# Patient Record
Sex: MALE | Race: WHITE | NOT HISPANIC OR LATINO | Employment: UNEMPLOYED | URBAN - METROPOLITAN AREA
[De-identification: names, ages, dates, MRNs, and addresses within clinical notes are randomized per-mention and may not be internally consistent; named-entity substitution may affect disease eponyms.]

---

## 2022-05-02 ENCOUNTER — HOSPITAL ENCOUNTER (EMERGENCY)
Facility: MEDICAL CENTER | Age: 62
End: 2022-05-02
Attending: EMERGENCY MEDICINE
Payer: MEDICAID

## 2022-05-02 ENCOUNTER — APPOINTMENT (OUTPATIENT)
Dept: RADIOLOGY | Facility: MEDICAL CENTER | Age: 62
End: 2022-05-02
Attending: EMERGENCY MEDICINE
Payer: MEDICAID

## 2022-05-02 VITALS
OXYGEN SATURATION: 92 % | HEART RATE: 91 BPM | WEIGHT: 160 LBS | HEIGHT: 73 IN | TEMPERATURE: 97 F | SYSTOLIC BLOOD PRESSURE: 136 MMHG | DIASTOLIC BLOOD PRESSURE: 84 MMHG | RESPIRATION RATE: 18 BRPM | BODY MASS INDEX: 21.2 KG/M2

## 2022-05-02 DIAGNOSIS — S09.90XA CLOSED HEAD INJURY, INITIAL ENCOUNTER: ICD-10-CM

## 2022-05-02 DIAGNOSIS — R53.1 GENERALIZED WEAKNESS: ICD-10-CM

## 2022-05-02 LAB
ALBUMIN SERPL BCP-MCNC: 4.1 G/DL (ref 3.2–4.9)
ALBUMIN/GLOB SERPL: 1.4 G/DL
ALP SERPL-CCNC: 120 U/L (ref 30–99)
ALT SERPL-CCNC: 9 U/L (ref 2–50)
ANION GAP SERPL CALC-SCNC: 14 MMOL/L (ref 7–16)
AST SERPL-CCNC: 16 U/L (ref 12–45)
BILIRUB SERPL-MCNC: 0.2 MG/DL (ref 0.1–1.5)
BUN SERPL-MCNC: 8 MG/DL (ref 8–22)
CALCIUM SERPL-MCNC: 8.9 MG/DL (ref 8.5–10.5)
CHLORIDE SERPL-SCNC: 105 MMOL/L (ref 96–112)
CO2 SERPL-SCNC: 23 MMOL/L (ref 20–33)
CREAT SERPL-MCNC: 0.6 MG/DL (ref 0.5–1.4)
GFR SERPLBLD CREATININE-BSD FMLA CKD-EPI: 111 ML/MIN/1.73 M 2
GLOBULIN SER CALC-MCNC: 2.9 G/DL (ref 1.9–3.5)
GLUCOSE SERPL-MCNC: 82 MG/DL (ref 65–99)
POTASSIUM SERPL-SCNC: 4 MMOL/L (ref 3.6–5.5)
PROT SERPL-MCNC: 7 G/DL (ref 6–8.2)
SODIUM SERPL-SCNC: 142 MMOL/L (ref 135–145)

## 2022-05-02 PROCEDURE — 70450 CT HEAD/BRAIN W/O DYE: CPT

## 2022-05-02 PROCEDURE — 36415 COLL VENOUS BLD VENIPUNCTURE: CPT

## 2022-05-02 PROCEDURE — A9270 NON-COVERED ITEM OR SERVICE: HCPCS | Performed by: EMERGENCY MEDICINE

## 2022-05-02 PROCEDURE — 80053 COMPREHEN METABOLIC PANEL: CPT

## 2022-05-02 PROCEDURE — 700102 HCHG RX REV CODE 250 W/ 637 OVERRIDE(OP): Performed by: EMERGENCY MEDICINE

## 2022-05-02 PROCEDURE — 99285 EMERGENCY DEPT VISIT HI MDM: CPT

## 2022-05-02 PROCEDURE — 99284 EMERGENCY DEPT VISIT MOD MDM: CPT

## 2022-05-02 RX ORDER — ACETAMINOPHEN 325 MG/1
325 TABLET ORAL ONCE
Status: COMPLETED | OUTPATIENT
Start: 2022-05-02 | End: 2022-05-02

## 2022-05-02 RX ADMIN — ACETAMINOPHEN 325 MG: 325 TABLET, FILM COATED ORAL at 19:45

## 2022-05-02 ASSESSMENT — LIFESTYLE VARIABLES
TOTAL SCORE: 0
EVER FELT BAD OR GUILTY ABOUT YOUR DRINKING: NO
AVERAGE NUMBER OF DAYS PER WEEK YOU HAVE A DRINK CONTAINING ALCOHOL: 7
HAVE PEOPLE ANNOYED YOU BY CRITICIZING YOUR DRINKING: NO
TOTAL SCORE: 0
HOW MANY TIMES IN THE PAST YEAR HAVE YOU HAD 5 OR MORE DRINKS IN A DAY: 5
TOTAL SCORE: 0
ON A TYPICAL DAY WHEN YOU DRINK ALCOHOL HOW MANY DRINKS DO YOU HAVE: 2
HAVE YOU EVER FELT YOU SHOULD CUT DOWN ON YOUR DRINKING: NO
CONSUMPTION TOTAL: POSITIVE
DO YOU DRINK ALCOHOL: YES
EVER HAD A DRINK FIRST THING IN THE MORNING TO STEADY YOUR NERVES TO GET RID OF A HANGOVER: NO

## 2022-05-02 NOTE — ED TRIAGE NOTES
Chief Complaint   Patient presents with   • Alcohol Intoxication   • Head Injury     Hit back of head   • Fall     Pt bib ems from [facility] for glf . Pt said that his friend gave him long island and he usually just drink 3 beers a day.   Pt using walker and fell backward, hitting back of head. No open wound noted but tender to touch.   Denies taking blood thinner .

## 2022-05-03 NOTE — ED PROVIDER NOTES
"ED Provider Note    ED Provider Note    Scribed for Shilpa Chappell MD by Shilpa Chappell M.D.. 5/2/2022, 7:12 PM.    Primary care provider: No primary care provider on file.  Means of arrival: Private  History obtained from: Patient  History limited by: None    CHIEF COMPLAINT  Chief Complaint   Patient presents with   • Alcohol Intoxication   • Head Injury     Hit back of head   • Fall       HPI  James Hurley is a 61 y.o. male who presents to the Emergency Department for evaluation of closed head injury.  Patient relates to staff he had a [place] ice tea today, he relates his legs feel generally weak and he fell backward striking the back of his head.  Patient notes he has had sensation of weakness and \"giving out\" in his legs chronically, he notes has been worked up out of state for this, no acute change with regard to numbness or weakness today.  He does not think he lost conscious, he notes no nausea no vomiting.  Pain is isolated the occipital scalp but no other apparent distracting injury elsewhere.  He is not anticoagulated but does take methimazole for his thyroid.    REVIEW OF SYSTEMS  Pertinent positives include head injury, alcohol use, headache. Pertinent negatives include no vomiting, no loss of conscious, not anticoagulated.  All other systems reviewed and negative.    PAST MEDICAL HISTORY   has a past medical history of Hypothyroid.    SURGICAL HISTORY  patient denies any surgical history    SOCIAL HISTORY  Social History     Tobacco Use   • Smoking status: Current Every Day Smoker   • Smokeless tobacco: Current User   Substance Use Topics   • Alcohol use: Yes   • Drug use: Not Currently      Social History     Substance and Sexual Activity   Drug Use Not Currently       FAMILY HISTORY  Noncontributory for trauma    CURRENT MEDICATIONS  Home Medications     Reviewed by Stephanie Javier R.N. (Registered Nurse) on 05/02/22 at 1619  Med List Status: Unable to Obtain   Medication Last " "Dose Status        Patient Nahid Taking any Medications                       ALLERGIES  No Known Allergies    PHYSICAL EXAM  VITAL SIGNS: /84   Pulse 91   Temp 36.1 °C (97 °F) (Temporal)   Resp 18   Ht 1.854 m (6' 1\")   Wt 72.6 kg (160 lb)   SpO2 92%   BMI 21.11 kg/m²     General: Alert, no acute distress  Skin: Warm, dry, normal for ethnicity  Head: Normocephalic, atraumatic  Neck: Trachea midline, no tenderness to posterior C-spine, no step-off.  Eye: PERRL, normal conjunctiva, extraocular movements intact without nystagmus sclera is anicteric,   ENMT: Oral mucosa mildly dry, no pharyngeal erythema or exudate  Cardiovascular: Regular rate and rhythm, No murmur, Normal peripheral perfusion  Respiratory: Lungs CTA, respirations are non-labored, breath sounds are equal  Musculoskeletal: No swelling, no deformity  Neurological: Alert and oriented to person, place, time, and situation.  Cranial nerves II through XII are grossly intact, no upper nor lower extremity pronator drift.  Upper and lower extremity strength and sensation 5 x 5 and symmetrical bilaterally.  Lymphatics: No lymphadenopathy  Psychiatric: Cooperative, appropriate mood & affect      DIAGNOSTIC STUDIES/PROCEDURES    LABS  Results for orders placed or performed during the hospital encounter of 05/02/22   Comp Metabolic Panel   Result Value Ref Range    Sodium 142 135 - 145 mmol/L    Potassium 4.0 3.6 - 5.5 mmol/L    Chloride 105 96 - 112 mmol/L    Co2 23 20 - 33 mmol/L    Anion Gap 14.0 7.0 - 16.0    Glucose 82 65 - 99 mg/dL    Bun 8 8 - 22 mg/dL    Creatinine 0.60 0.50 - 1.40 mg/dL    Calcium 8.9 8.5 - 10.5 mg/dL    AST(SGOT) 16 12 - 45 U/L    ALT(SGPT) 9 2 - 50 U/L    Alkaline Phosphatase 120 (H) 30 - 99 U/L    Total Bilirubin 0.2 0.1 - 1.5 mg/dL    Albumin 4.1 3.2 - 4.9 g/dL    Total Protein 7.0 6.0 - 8.2 g/dL    Globulin 2.9 1.9 - 3.5 g/dL    A-G Ratio 1.4 g/dL   ESTIMATED GFR   Result Value Ref Range    GFR (CKD-EPI) 111 >60 " "mL/min/1.73 m 2     All labs reviewed by me.      RADIOLOGY  CT-HEAD W/O   Final Result      1.  No acute intracranial abnormality.   2.  Predominant central atrophy and moderate white matter changes, greater than expected for age.   3.  LEFT posterior parietal and occipital encephalomalacia may be due to old infarct or prior trauma.   4.  Mild chronic paranasal sinus disease.           The radiologist's interpretation of all radiological studies have been reviewed by me.    COURSE & MEDICAL DECISION MAKING  Pertinent Labs & Imaging studies reviewed. (See chart for details)    7:12 PM - Patient seen and examined at bedside. Patient will be treated with acetaminophen dose attenuated given alcohol history. Ordered CT brain and CMP to evaluate his symptoms. The differential diagnoses include but are not limited to: Closed head injury, intercranial hemorrhage, electrolyte abnormality    2050: Patient reassessed, feeling better and appears clinically sober at this time.  I have updated him with thankfully unremarkable CT.    Patient Vitals for the past 24 hrs:   BP Temp Temp src Pulse Resp SpO2 Height Weight   05/02/22 1950 136/84 36.1 °C (97 °F) Temporal 91 18 92 % -- --   05/02/22 1616 -- -- -- -- -- -- 1.854 m (6' 1\") 72.6 kg (160 lb)   05/02/22 1613 136/88 36 °C (96.8 °F) Temporal 97 16 91 % -- --         Decision Making:  This is a 61 y.o. year old male who presents with closed head injury.  Patient denies any syncope but notes rather his legs \"gave out\" and he fell backward striking the back of his head.  He did admit alcohol use to staff as well.  He has an NIH stroke scale of 0 and demonstrates no pronator drift or other neurologic deficits.  Given his age, history of alcohol use, and significant negatives of injury there is clear indication for CT of the brain.  Metabolic work-up will be obtained as well given notes chronic sensation of weakness and \"giving out\" to his legs to evaluate for potential electrolyte " abnormality.  Labs are unrevealing, no hypokalemia, no acidosis.  CT is benign.  Given patient's sensation of leg weakness is chronic and otherwise is neurovascular intact there is no indication for inpatient management at this time.  No evidence of intercranial hemorrhage, patient quite relieved and comfortable with outpatient follow-up.    The patient will return for new or worsening symptoms and is stable at the time of discharge.    Patient has had high blood pressure while in the emergency department, felt likely secondary to medical condition. Counseled patient to monitor blood pressure at home and follow up with primary care physician.     DISPOSITION:  Patient will be discharged home in stable condition.    FOLLOW UP:  Mat Key M.D.  [address]  [facility] [id number] [phone]    Schedule an appointment as soon as possible for a visit         OUTPATIENT MEDICATIONS:  There are no discharge medications for this patient.          FINAL IMPRESSION  1. Closed head injury, initial encounter    2. Generalized weakness          Shilpa LOPEZ M.D. (Mohini), am scribing for, and in the presence of, Shilpa Chappell MD.    Electronically signed by: Shilpa Chappell M.D. (Hannahibe), 5/2/2022    Shilpa LOPEZ MD personally performed the services described in this documentation, as scribed by Shilpa Chappell M.D. in my presence, and it is both accurate and complete    The note accurately reflects work and decisions made by me.  Shilpa Chappell M.D.  5/3/2022  12:26 AM

## 2022-07-10 ENCOUNTER — HOSPITAL ENCOUNTER (INPATIENT)
Facility: MEDICAL CENTER | Age: 62
LOS: 6 days | DRG: 871 | End: 2022-07-16
Attending: EMERGENCY MEDICINE | Admitting: STUDENT IN AN ORGANIZED HEALTH CARE EDUCATION/TRAINING PROGRAM
Payer: MEDICAID

## 2022-07-10 ENCOUNTER — APPOINTMENT (OUTPATIENT)
Dept: RADIOLOGY | Facility: MEDICAL CENTER | Age: 62
DRG: 871 | End: 2022-07-10
Attending: EMERGENCY MEDICINE
Payer: MEDICAID

## 2022-07-10 ENCOUNTER — APPOINTMENT (OUTPATIENT)
Dept: RADIOLOGY | Facility: MEDICAL CENTER | Age: 62
DRG: 871 | End: 2022-07-10
Payer: MEDICAID

## 2022-07-10 PROBLEM — J96.01 ACUTE HYPOXEMIC RESPIRATORY FAILURE (HCC): Status: ACTIVE | Noted: 2022-07-10

## 2022-07-10 PROBLEM — R40.20 LOSS OF CONSCIOUSNESS (HCC): Status: ACTIVE | Noted: 2022-07-10

## 2022-07-10 PROBLEM — R79.89 ELEVATED TROPONIN: Status: ACTIVE | Noted: 2022-07-10

## 2022-07-10 PROBLEM — J18.9 ATYPICAL PNEUMONIA: Status: ACTIVE | Noted: 2022-07-10

## 2022-07-10 PROBLEM — E87.20 LACTIC ACIDOSIS: Status: ACTIVE | Noted: 2022-07-10

## 2022-07-10 PROBLEM — A41.9 SEPSIS (HCC): Status: ACTIVE | Noted: 2022-07-10

## 2022-07-10 PROBLEM — E05.90 HYPERTHYROIDISM: Status: ACTIVE | Noted: 2022-07-10

## 2022-07-10 PROBLEM — R07.81 PLEURITIC CHEST PAIN: Status: ACTIVE | Noted: 2022-07-10

## 2022-07-10 PROBLEM — Z86.39 HISTORY OF HYPERTHYROIDISM: Status: ACTIVE | Noted: 2022-07-10

## 2022-07-10 PROBLEM — G93.89 ENCEPHALOMALACIA: Status: ACTIVE | Noted: 2022-07-10

## 2022-07-10 LAB
ALBUMIN SERPL BCP-MCNC: 4.2 G/DL (ref 3.2–4.9)
ALBUMIN/GLOB SERPL: 1.4 G/DL
ALP SERPL-CCNC: 115 U/L (ref 30–99)
ALT SERPL-CCNC: 14 U/L (ref 2–50)
ANION GAP SERPL CALC-SCNC: 12 MMOL/L (ref 7–16)
APPEARANCE UR: CLEAR
AST SERPL-CCNC: 15 U/L (ref 12–45)
BASOPHILS # BLD AUTO: 0.7 % (ref 0–1.8)
BASOPHILS # BLD: 0.08 K/UL (ref 0–0.12)
BILIRUB SERPL-MCNC: 0.4 MG/DL (ref 0.1–1.5)
BILIRUB UR QL STRIP.AUTO: NEGATIVE
BUN SERPL-MCNC: 8 MG/DL (ref 8–22)
CALCIUM SERPL-MCNC: 9.1 MG/DL (ref 8.5–10.5)
CHLORIDE SERPL-SCNC: 105 MMOL/L (ref 96–112)
CO2 SERPL-SCNC: 21 MMOL/L (ref 20–33)
COLOR UR: YELLOW
CREAT SERPL-MCNC: 0.76 MG/DL (ref 0.5–1.4)
EKG IMPRESSION: NORMAL
EOSINOPHIL # BLD AUTO: 0.2 K/UL (ref 0–0.51)
EOSINOPHIL NFR BLD: 1.7 % (ref 0–6.9)
ERYTHROCYTE [DISTWIDTH] IN BLOOD BY AUTOMATED COUNT: 46.2 FL (ref 35.9–50)
ETHANOL BLD-MCNC: <10.1 MG/DL
FLUAV RNA SPEC QL NAA+PROBE: NEGATIVE
FLUBV RNA SPEC QL NAA+PROBE: NEGATIVE
GFR SERPLBLD CREATININE-BSD FMLA CKD-EPI: 103 ML/MIN/1.73 M 2
GLOBULIN SER CALC-MCNC: 3.1 G/DL (ref 1.9–3.5)
GLUCOSE BLD STRIP.AUTO-MCNC: 139 MG/DL (ref 65–99)
GLUCOSE SERPL-MCNC: 137 MG/DL (ref 65–99)
GLUCOSE UR STRIP.AUTO-MCNC: NEGATIVE MG/DL
HCT VFR BLD AUTO: 49 % (ref 42–52)
HGB BLD-MCNC: 17.3 G/DL (ref 14–18)
IMM GRANULOCYTES # BLD AUTO: 0.04 K/UL (ref 0–0.11)
IMM GRANULOCYTES NFR BLD AUTO: 0.3 % (ref 0–0.9)
KETONES UR STRIP.AUTO-MCNC: NEGATIVE MG/DL
LACTATE SERPL-SCNC: 1.1 MMOL/L (ref 0.5–2)
LACTATE SERPL-SCNC: 3.2 MMOL/L (ref 0.5–2)
LEUKOCYTE ESTERASE UR QL STRIP.AUTO: NEGATIVE
LYMPHOCYTES # BLD AUTO: 0.8 K/UL (ref 1–4.8)
LYMPHOCYTES NFR BLD: 6.8 % (ref 22–41)
MCH RBC QN AUTO: 34.5 PG (ref 27–33)
MCHC RBC AUTO-ENTMCNC: 35.3 G/DL (ref 33.7–35.3)
MCV RBC AUTO: 97.8 FL (ref 81.4–97.8)
MICRO URNS: NORMAL
MONOCYTES # BLD AUTO: 1.26 K/UL (ref 0–0.85)
MONOCYTES NFR BLD AUTO: 10.8 % (ref 0–13.4)
NEUTROPHILS # BLD AUTO: 9.31 K/UL (ref 1.82–7.42)
NEUTROPHILS NFR BLD: 79.7 % (ref 44–72)
NITRITE UR QL STRIP.AUTO: NEGATIVE
NRBC # BLD AUTO: 0 K/UL
NRBC BLD-RTO: 0 /100 WBC
NT-PROBNP SERPL IA-MCNC: 67 PG/ML (ref 0–125)
PH UR STRIP.AUTO: 6.5 [PH] (ref 5–8)
PLATELET # BLD AUTO: 258 K/UL (ref 164–446)
PMV BLD AUTO: 8.6 FL (ref 9–12.9)
POTASSIUM SERPL-SCNC: 4.4 MMOL/L (ref 3.6–5.5)
PROCALCITONIN SERPL-MCNC: 0.05 NG/ML
PROT SERPL-MCNC: 7.3 G/DL (ref 6–8.2)
PROT UR QL STRIP: NEGATIVE MG/DL
RBC # BLD AUTO: 5.01 M/UL (ref 4.7–6.1)
RBC UR QL AUTO: NEGATIVE
RSV RNA SPEC QL NAA+PROBE: NEGATIVE
SARS-COV-2 RNA RESP QL NAA+PROBE: NOTDETECTED
SODIUM SERPL-SCNC: 138 MMOL/L (ref 135–145)
SP GR UR STRIP.AUTO: 1.01
SPECIMEN SOURCE: NORMAL
TROPONIN T SERPL-MCNC: 22 NG/L (ref 6–19)
UROBILINOGEN UR STRIP.AUTO-MCNC: 1 MG/DL
WBC # BLD AUTO: 11.7 K/UL (ref 4.8–10.8)

## 2022-07-10 PROCEDURE — 99285 EMERGENCY DEPT VISIT HI MDM: CPT

## 2022-07-10 PROCEDURE — 83605 ASSAY OF LACTIC ACID: CPT | Mod: 91

## 2022-07-10 PROCEDURE — 700111 HCHG RX REV CODE 636 W/ 250 OVERRIDE (IP)

## 2022-07-10 PROCEDURE — 83880 ASSAY OF NATRIURETIC PEPTIDE: CPT

## 2022-07-10 PROCEDURE — 81003 URINALYSIS AUTO W/O SCOPE: CPT

## 2022-07-10 PROCEDURE — C9803 HOPD COVID-19 SPEC COLLECT: HCPCS | Performed by: EMERGENCY MEDICINE

## 2022-07-10 PROCEDURE — 87086 URINE CULTURE/COLONY COUNT: CPT

## 2022-07-10 PROCEDURE — 84145 PROCALCITONIN (PCT): CPT

## 2022-07-10 PROCEDURE — A9270 NON-COVERED ITEM OR SERVICE: HCPCS | Performed by: EMERGENCY MEDICINE

## 2022-07-10 PROCEDURE — 93005 ELECTROCARDIOGRAM TRACING: CPT | Performed by: EMERGENCY MEDICINE

## 2022-07-10 PROCEDURE — 99221 1ST HOSP IP/OBS SF/LOW 40: CPT | Mod: GC | Performed by: STUDENT IN AN ORGANIZED HEALTH CARE EDUCATION/TRAINING PROGRAM

## 2022-07-10 PROCEDURE — 84484 ASSAY OF TROPONIN QUANT: CPT

## 2022-07-10 PROCEDURE — 80053 COMPREHEN METABOLIC PANEL: CPT

## 2022-07-10 PROCEDURE — 700105 HCHG RX REV CODE 258: Performed by: EMERGENCY MEDICINE

## 2022-07-10 PROCEDURE — 84439 ASSAY OF FREE THYROXINE: CPT

## 2022-07-10 PROCEDURE — 82077 ASSAY SPEC XCP UR&BREATH IA: CPT

## 2022-07-10 PROCEDURE — 36415 COLL VENOUS BLD VENIPUNCTURE: CPT

## 2022-07-10 PROCEDURE — 770020 HCHG ROOM/CARE - TELE (206)

## 2022-07-10 PROCEDURE — 87040 BLOOD CULTURE FOR BACTERIA: CPT

## 2022-07-10 PROCEDURE — 86376 MICROSOMAL ANTIBODY EACH: CPT

## 2022-07-10 PROCEDURE — 84443 ASSAY THYROID STIM HORMONE: CPT

## 2022-07-10 PROCEDURE — 0241U HCHG SARS-COV-2 COVID-19 NFCT DS RESP RNA 4 TRGT MIC: CPT

## 2022-07-10 PROCEDURE — 82962 GLUCOSE BLOOD TEST: CPT

## 2022-07-10 PROCEDURE — 70450 CT HEAD/BRAIN W/O DYE: CPT

## 2022-07-10 PROCEDURE — 700111 HCHG RX REV CODE 636 W/ 250 OVERRIDE (IP): Performed by: EMERGENCY MEDICINE

## 2022-07-10 PROCEDURE — 700102 HCHG RX REV CODE 250 W/ 637 OVERRIDE(OP): Performed by: EMERGENCY MEDICINE

## 2022-07-10 PROCEDURE — 96375 TX/PRO/DX INJ NEW DRUG ADDON: CPT

## 2022-07-10 PROCEDURE — 96365 THER/PROPH/DIAG IV INF INIT: CPT

## 2022-07-10 PROCEDURE — 85025 COMPLETE CBC W/AUTO DIFF WBC: CPT

## 2022-07-10 PROCEDURE — 71045 X-RAY EXAM CHEST 1 VIEW: CPT

## 2022-07-10 RX ORDER — SODIUM CHLORIDE, SODIUM LACTATE, POTASSIUM CHLORIDE, AND CALCIUM CHLORIDE .6; .31; .03; .02 G/100ML; G/100ML; G/100ML; G/100ML
30 INJECTION, SOLUTION INTRAVENOUS ONCE
Status: DISCONTINUED | OUTPATIENT
Start: 2022-07-10 | End: 2022-07-10

## 2022-07-10 RX ORDER — ONDANSETRON 2 MG/ML
INJECTION INTRAMUSCULAR; INTRAVENOUS
Status: COMPLETED
Start: 2022-07-10 | End: 2022-07-10

## 2022-07-10 RX ORDER — IBUPROFEN 600 MG/1
600 TABLET ORAL ONCE
Status: COMPLETED | OUTPATIENT
Start: 2022-07-10 | End: 2022-07-10

## 2022-07-10 RX ORDER — SODIUM CHLORIDE 9 MG/ML
30 INJECTION, SOLUTION INTRAVENOUS ONCE
Status: COMPLETED | OUTPATIENT
Start: 2022-07-10 | End: 2022-07-10

## 2022-07-10 RX ORDER — AZITHROMYCIN 500 MG/1
500 INJECTION, POWDER, LYOPHILIZED, FOR SOLUTION INTRAVENOUS ONCE
Status: COMPLETED | OUTPATIENT
Start: 2022-07-10 | End: 2022-07-10

## 2022-07-10 RX ORDER — ACETAMINOPHEN 325 MG/1
650 TABLET ORAL ONCE
Status: COMPLETED | OUTPATIENT
Start: 2022-07-10 | End: 2022-07-10

## 2022-07-10 RX ORDER — AZITHROMYCIN 250 MG/1
250 TABLET, FILM COATED ORAL DAILY
Status: DISCONTINUED | OUTPATIENT
Start: 2022-07-11 | End: 2022-07-11

## 2022-07-10 RX ORDER — ONDANSETRON 2 MG/ML
4 INJECTION INTRAMUSCULAR; INTRAVENOUS ONCE
Status: COMPLETED | OUTPATIENT
Start: 2022-07-10 | End: 2022-07-10

## 2022-07-10 RX ADMIN — IBUPROFEN 600 MG: 600 TABLET, FILM COATED ORAL at 20:51

## 2022-07-10 RX ADMIN — SODIUM CHLORIDE 1974 ML: 9 INJECTION, SOLUTION INTRAVENOUS at 20:00

## 2022-07-10 RX ADMIN — ONDANSETRON 4 MG: 2 INJECTION INTRAMUSCULAR; INTRAVENOUS at 22:52

## 2022-07-10 RX ADMIN — ACETAMINOPHEN 650 MG: 325 TABLET, FILM COATED ORAL at 19:28

## 2022-07-10 RX ADMIN — CEFTRIAXONE SODIUM 2 G: 10 INJECTION, POWDER, FOR SOLUTION INTRAVENOUS at 22:40

## 2022-07-10 RX ADMIN — AZITHROMYCIN MONOHYDRATE 500 MG: 500 INJECTION, POWDER, LYOPHILIZED, FOR SOLUTION INTRAVENOUS at 19:28

## 2022-07-11 ENCOUNTER — APPOINTMENT (OUTPATIENT)
Dept: CARDIOLOGY | Facility: MEDICAL CENTER | Age: 62
DRG: 871 | End: 2022-07-11
Attending: STUDENT IN AN ORGANIZED HEALTH CARE EDUCATION/TRAINING PROGRAM
Payer: MEDICAID

## 2022-07-11 ENCOUNTER — APPOINTMENT (OUTPATIENT)
Dept: RADIOLOGY | Facility: MEDICAL CENTER | Age: 62
DRG: 871 | End: 2022-07-11
Attending: STUDENT IN AN ORGANIZED HEALTH CARE EDUCATION/TRAINING PROGRAM
Payer: MEDICAID

## 2022-07-11 PROBLEM — R55 SYNCOPE: Status: ACTIVE | Noted: 2022-07-10

## 2022-07-11 PROBLEM — G93.41 ACUTE METABOLIC ENCEPHALOPATHY: Status: ACTIVE | Noted: 2022-07-11

## 2022-07-11 LAB
ALBUMIN SERPL BCP-MCNC: 3.6 G/DL (ref 3.2–4.9)
ALBUMIN/GLOB SERPL: 1.3 G/DL
ALP SERPL-CCNC: 98 U/L (ref 30–99)
ALT SERPL-CCNC: 14 U/L (ref 2–50)
AMPHET UR QL SCN: NEGATIVE
ANION GAP SERPL CALC-SCNC: 12 MMOL/L (ref 7–16)
AST SERPL-CCNC: 22 U/L (ref 12–45)
BARBITURATES UR QL SCN: NEGATIVE
BASOPHILS # BLD AUTO: 0.4 % (ref 0–1.8)
BASOPHILS # BLD: 0.06 K/UL (ref 0–0.12)
BENZODIAZ UR QL SCN: NEGATIVE
BILIRUB SERPL-MCNC: 0.6 MG/DL (ref 0.1–1.5)
BUN SERPL-MCNC: 7 MG/DL (ref 8–22)
BZE UR QL SCN: NEGATIVE
CALCIUM SERPL-MCNC: 8.2 MG/DL (ref 8.5–10.5)
CANNABINOIDS UR QL SCN: POSITIVE
CHLORIDE SERPL-SCNC: 100 MMOL/L (ref 96–112)
CO2 SERPL-SCNC: 22 MMOL/L (ref 20–33)
CREAT SERPL-MCNC: 0.54 MG/DL (ref 0.5–1.4)
EKG IMPRESSION: NORMAL
EOSINOPHIL # BLD AUTO: 0.01 K/UL (ref 0–0.51)
EOSINOPHIL NFR BLD: 0.1 % (ref 0–6.9)
ERYTHROCYTE [DISTWIDTH] IN BLOOD BY AUTOMATED COUNT: 47.5 FL (ref 35.9–50)
GFR SERPLBLD CREATININE-BSD FMLA CKD-EPI: 114 ML/MIN/1.73 M 2
GLOBULIN SER CALC-MCNC: 2.7 G/DL (ref 1.9–3.5)
GLUCOSE SERPL-MCNC: 85 MG/DL (ref 65–99)
HCT VFR BLD AUTO: 46.2 % (ref 42–52)
HGB BLD-MCNC: 16.1 G/DL (ref 14–18)
IMM GRANULOCYTES # BLD AUTO: 0.06 K/UL (ref 0–0.11)
IMM GRANULOCYTES NFR BLD AUTO: 0.4 % (ref 0–0.9)
LACTATE SERPL-SCNC: 1.1 MMOL/L (ref 0.5–2)
LV EJECT FRACT  99904: 65
LV EJECT FRACT MOD 2C 99903: 61.34
LV EJECT FRACT MOD 4C 99902: 61.7
LV EJECT FRACT MOD BP 99901: 61.51
LYMPHOCYTES # BLD AUTO: 0.97 K/UL (ref 1–4.8)
LYMPHOCYTES NFR BLD: 7.1 % (ref 22–41)
MAGNESIUM SERPL-MCNC: 1.9 MG/DL (ref 1.5–2.5)
MCH RBC QN AUTO: 34.8 PG (ref 27–33)
MCHC RBC AUTO-ENTMCNC: 34.8 G/DL (ref 33.7–35.3)
MCV RBC AUTO: 99.8 FL (ref 81.4–97.8)
METHADONE UR QL SCN: NEGATIVE
MONOCYTES # BLD AUTO: 1.41 K/UL (ref 0–0.85)
MONOCYTES NFR BLD AUTO: 10.4 % (ref 0–13.4)
NEUTROPHILS # BLD AUTO: 11.11 K/UL (ref 1.82–7.42)
NEUTROPHILS NFR BLD: 81.6 % (ref 44–72)
NRBC # BLD AUTO: 0 K/UL
NRBC BLD-RTO: 0 /100 WBC
OPIATES UR QL SCN: NEGATIVE
OXYCODONE UR QL SCN: NEGATIVE
PCP UR QL SCN: NEGATIVE
PHOSPHATE SERPL-MCNC: 3.7 MG/DL (ref 2.5–4.5)
PLATELET # BLD AUTO: 234 K/UL (ref 164–446)
PMV BLD AUTO: 9 FL (ref 9–12.9)
POTASSIUM SERPL-SCNC: 4 MMOL/L (ref 3.6–5.5)
PROCALCITONIN SERPL-MCNC: 0.63 NG/ML
PROLACTIN SERPL-MCNC: 3.79 NG/ML (ref 2.1–17.7)
PROPOXYPH UR QL SCN: NEGATIVE
PROT SERPL-MCNC: 6.3 G/DL (ref 6–8.2)
RBC # BLD AUTO: 4.63 M/UL (ref 4.7–6.1)
SODIUM SERPL-SCNC: 134 MMOL/L (ref 135–145)
T4 FREE SERPL-MCNC: 1.35 NG/DL (ref 0.93–1.7)
THYROPEROXIDASE AB SERPL-ACNC: 20 IU/ML (ref 0–9)
TROPONIN T SERPL-MCNC: 27 NG/L (ref 6–19)
TROPONIN T SERPL-MCNC: 37 NG/L (ref 6–19)
TSH SERPL DL<=0.005 MIU/L-ACNC: <0.005 UIU/ML (ref 0.38–5.33)
WBC # BLD AUTO: 13.6 K/UL (ref 4.8–10.8)

## 2022-07-11 PROCEDURE — 93306 TTE W/DOPPLER COMPLETE: CPT | Mod: 26 | Performed by: INTERNAL MEDICINE

## 2022-07-11 PROCEDURE — 770020 HCHG ROOM/CARE - TELE (206)

## 2022-07-11 PROCEDURE — 95816 EEG AWAKE AND DROWSY: CPT | Performed by: STUDENT IN AN ORGANIZED HEALTH CARE EDUCATION/TRAINING PROGRAM

## 2022-07-11 PROCEDURE — 700102 HCHG RX REV CODE 250 W/ 637 OVERRIDE(OP): Performed by: INTERNAL MEDICINE

## 2022-07-11 PROCEDURE — 86376 MICROSOMAL ANTIBODY EACH: CPT

## 2022-07-11 PROCEDURE — 700117 HCHG RX CONTRAST REV CODE 255: Performed by: STUDENT IN AN ORGANIZED HEALTH CARE EDUCATION/TRAINING PROGRAM

## 2022-07-11 PROCEDURE — 95816 EEG AWAKE AND DROWSY: CPT | Mod: 26 | Performed by: STUDENT IN AN ORGANIZED HEALTH CARE EDUCATION/TRAINING PROGRAM

## 2022-07-11 PROCEDURE — 93005 ELECTROCARDIOGRAM TRACING: CPT | Performed by: STUDENT IN AN ORGANIZED HEALTH CARE EDUCATION/TRAINING PROGRAM

## 2022-07-11 PROCEDURE — 99233 SBSQ HOSP IP/OBS HIGH 50: CPT | Mod: GC | Performed by: INTERNAL MEDICINE

## 2022-07-11 PROCEDURE — 80307 DRUG TEST PRSMV CHEM ANLYZR: CPT

## 2022-07-11 PROCEDURE — 93306 TTE W/DOPPLER COMPLETE: CPT

## 2022-07-11 PROCEDURE — 83735 ASSAY OF MAGNESIUM: CPT

## 2022-07-11 PROCEDURE — 84484 ASSAY OF TROPONIN QUANT: CPT

## 2022-07-11 PROCEDURE — 700117 HCHG RX CONTRAST REV CODE 255

## 2022-07-11 PROCEDURE — A9576 INJ PROHANCE MULTIPACK: HCPCS | Performed by: STUDENT IN AN ORGANIZED HEALTH CARE EDUCATION/TRAINING PROGRAM

## 2022-07-11 PROCEDURE — A9270 NON-COVERED ITEM OR SERVICE: HCPCS | Performed by: STUDENT IN AN ORGANIZED HEALTH CARE EDUCATION/TRAINING PROGRAM

## 2022-07-11 PROCEDURE — 83605 ASSAY OF LACTIC ACID: CPT

## 2022-07-11 PROCEDURE — 84432 ASSAY OF THYROGLOBULIN: CPT

## 2022-07-11 PROCEDURE — 84442 ASSAY OF THYROID ACTIVITY: CPT

## 2022-07-11 PROCEDURE — 700111 HCHG RX REV CODE 636 W/ 250 OVERRIDE (IP)

## 2022-07-11 PROCEDURE — 70553 MRI BRAIN STEM W/O & W/DYE: CPT

## 2022-07-11 PROCEDURE — 36415 COLL VENOUS BLD VENIPUNCTURE: CPT

## 2022-07-11 PROCEDURE — 84145 PROCALCITONIN (PCT): CPT

## 2022-07-11 PROCEDURE — 700102 HCHG RX REV CODE 250 W/ 637 OVERRIDE(OP)

## 2022-07-11 PROCEDURE — 80053 COMPREHEN METABOLIC PANEL: CPT

## 2022-07-11 PROCEDURE — 700102 HCHG RX REV CODE 250 W/ 637 OVERRIDE(OP): Performed by: STUDENT IN AN ORGANIZED HEALTH CARE EDUCATION/TRAINING PROGRAM

## 2022-07-11 PROCEDURE — A9270 NON-COVERED ITEM OR SERVICE: HCPCS

## 2022-07-11 PROCEDURE — A9270 NON-COVERED ITEM OR SERVICE: HCPCS | Performed by: INTERNAL MEDICINE

## 2022-07-11 PROCEDURE — 84146 ASSAY OF PROLACTIN: CPT

## 2022-07-11 PROCEDURE — 86800 THYROGLOBULIN ANTIBODY: CPT

## 2022-07-11 PROCEDURE — 71275 CT ANGIOGRAPHY CHEST: CPT

## 2022-07-11 PROCEDURE — 84100 ASSAY OF PHOSPHORUS: CPT

## 2022-07-11 PROCEDURE — 85025 COMPLETE CBC W/AUTO DIFF WBC: CPT

## 2022-07-11 RX ORDER — AZITHROMYCIN 250 MG/1
500 TABLET, FILM COATED ORAL DAILY
Status: COMPLETED | OUTPATIENT
Start: 2022-07-12 | End: 2022-07-12

## 2022-07-11 RX ORDER — AMOXICILLIN 250 MG
2 CAPSULE ORAL 2 TIMES DAILY
Status: DISCONTINUED | OUTPATIENT
Start: 2022-07-11 | End: 2022-07-16 | Stop reason: HOSPADM

## 2022-07-11 RX ORDER — POLYETHYLENE GLYCOL 3350 17 G/17G
1 POWDER, FOR SOLUTION ORAL
Status: DISCONTINUED | OUTPATIENT
Start: 2022-07-11 | End: 2022-07-16 | Stop reason: HOSPADM

## 2022-07-11 RX ORDER — BISACODYL 10 MG
10 SUPPOSITORY, RECTAL RECTAL
Status: DISCONTINUED | OUTPATIENT
Start: 2022-07-11 | End: 2022-07-16 | Stop reason: HOSPADM

## 2022-07-11 RX ORDER — AZITHROMYCIN 250 MG/1
250 TABLET, FILM COATED ORAL ONCE
Status: COMPLETED | OUTPATIENT
Start: 2022-07-11 | End: 2022-07-11

## 2022-07-11 RX ORDER — ACETAMINOPHEN 325 MG/1
650 TABLET ORAL ONCE
Status: COMPLETED | OUTPATIENT
Start: 2022-07-11 | End: 2022-07-11

## 2022-07-11 RX ORDER — ENOXAPARIN SODIUM 100 MG/ML
40 INJECTION SUBCUTANEOUS DAILY
Status: DISCONTINUED | OUTPATIENT
Start: 2022-07-11 | End: 2022-07-16 | Stop reason: HOSPADM

## 2022-07-11 RX ORDER — IPRATROPIUM BROMIDE AND ALBUTEROL SULFATE 2.5; .5 MG/3ML; MG/3ML
3 SOLUTION RESPIRATORY (INHALATION)
Status: DISCONTINUED | OUTPATIENT
Start: 2022-07-11 | End: 2022-07-16 | Stop reason: HOSPADM

## 2022-07-11 RX ADMIN — ACETAMINOPHEN 650 MG: 325 TABLET, FILM COATED ORAL at 21:51

## 2022-07-11 RX ADMIN — IOHEXOL 60 ML: 350 INJECTION, SOLUTION INTRAVENOUS at 00:30

## 2022-07-11 RX ADMIN — AZITHROMYCIN MONOHYDRATE 250 MG: 250 TABLET ORAL at 14:33

## 2022-07-11 RX ADMIN — GADOTERIDOL 13 ML: 279.3 INJECTION, SOLUTION INTRAVENOUS at 21:05

## 2022-07-11 RX ADMIN — ENOXAPARIN SODIUM 40 MG: 40 INJECTION SUBCUTANEOUS at 17:11

## 2022-07-11 RX ADMIN — AZITHROMYCIN MONOHYDRATE 250 MG: 250 TABLET ORAL at 06:32

## 2022-07-11 ASSESSMENT — LIFESTYLE VARIABLES
EVER FELT BAD OR GUILTY ABOUT YOUR DRINKING: NO
HAVE YOU EVER FELT YOU SHOULD CUT DOWN ON YOUR DRINKING: NO
HOW MANY TIMES IN THE PAST YEAR HAVE YOU HAD 5 OR MORE DRINKS IN A DAY: 0
ALCOHOL_USE: YES
CONSUMPTION TOTAL: NEGATIVE
TOTAL SCORE: 0
HAVE PEOPLE ANNOYED YOU BY CRITICIZING YOUR DRINKING: NO
TOTAL SCORE: 0
ON A TYPICAL DAY WHEN YOU DRINK ALCOHOL HOW MANY DRINKS DO YOU HAVE: 2
TOTAL SCORE: 0
DOES PATIENT WANT TO STOP DRINKING: NO
AVERAGE NUMBER OF DAYS PER WEEK YOU HAVE A DRINK CONTAINING ALCOHOL: 3
EVER HAD A DRINK FIRST THING IN THE MORNING TO STEADY YOUR NERVES TO GET RID OF A HANGOVER: NO
SUBSTANCE_ABUSE: 0

## 2022-07-11 ASSESSMENT — COGNITIVE AND FUNCTIONAL STATUS - GENERAL
SUGGESTED CMS G CODE MODIFIER MOBILITY: CK
CLIMB 3 TO 5 STEPS WITH RAILING: A LOT
DRESSING REGULAR UPPER BODY CLOTHING: A LITTLE
SUGGESTED CMS G CODE MODIFIER DAILY ACTIVITY: CJ
STANDING UP FROM CHAIR USING ARMS: A LITTLE
MOVING TO AND FROM BED TO CHAIR: A LITTLE
HELP NEEDED FOR BATHING: A LITTLE
DAILY ACTIVITIY SCORE: 20
TOILETING: A LITTLE
MOVING FROM LYING ON BACK TO SITTING ON SIDE OF FLAT BED: A LITTLE
MOBILITY SCORE: 18
WALKING IN HOSPITAL ROOM: A LITTLE
DRESSING REGULAR LOWER BODY CLOTHING: A LITTLE

## 2022-07-11 ASSESSMENT — ENCOUNTER SYMPTOMS
FEVER: 1
CONSTIPATION: 0
DIAPHORESIS: 0
ABDOMINAL PAIN: 0
CHILLS: 1
WHEEZING: 0
SHORTNESS OF BREATH: 0
PSYCHIATRIC NEGATIVE: 1
NAUSEA: 1
LOSS OF CONSCIOUSNESS: 1
NAUSEA: 0
BLOOD IN STOOL: 0
VOMITING: 1
SHORTNESS OF BREATH: 1
VOMITING: 0
WEIGHT LOSS: 0
EYES NEGATIVE: 1
PALPITATIONS: 0
COUGH: 1
DIARRHEA: 1
CARDIOVASCULAR NEGATIVE: 1
MYALGIAS: 0
COUGH: 0
MUSCULOSKELETAL NEGATIVE: 1
HEMOPTYSIS: 0
SPUTUM PRODUCTION: 0
POLYDIPSIA: 0
SORE THROAT: 0
HEADACHES: 0
FLANK PAIN: 0

## 2022-07-11 ASSESSMENT — PATIENT HEALTH QUESTIONNAIRE - PHQ9
1. LITTLE INTEREST OR PLEASURE IN DOING THINGS: NOT AT ALL
SUM OF ALL RESPONSES TO PHQ9 QUESTIONS 1 AND 2: 0
2. FEELING DOWN, DEPRESSED, IRRITABLE, OR HOPELESS: NOT AT ALL

## 2022-07-11 ASSESSMENT — PAIN DESCRIPTION - PAIN TYPE
TYPE: ACUTE PAIN

## 2022-07-11 ASSESSMENT — FIBROSIS 4 INDEX: FIB4 SCORE: 1.48

## 2022-07-11 NOTE — PROCEDURES
INPATIENT ROUTINE VIDEO ELECTROENCEPHALOGRAM REPORT      Referring provider:   Dr. Leon    DOS: 07/11/22 (0 hours and 24 minutes of total recording time).     INDICATION:  James Hurley 61 y.o. male presenting with altered mental status    CURRENT ANTIEPILEPTIC AND/OR SEDATING REGIMEN:   No AEDs    TECHNIQUE: Routine VEEG was set up by a Neurodiagnostic technologist who performed education to the patient and staff. A minimum of 23 electrodes and 23 channel recording was setup and performed by Neurodiagnostic technologist, in accordance with the international 10-20 system. The study was reviewed in bipolar and referential montages. The recording examined the patient in the  awake and drowsy state(s).     DESCRIPTION OF THE RECORD:  During maximal wakefulness, the background was continuous and showed a 8.5 Hz posterior dominant rhythm, with a mixture of mostly beta, alpha, and fast theta frequencies.  Reactivity and state changes were present .  During drowsiness, theta/delta frequencies were seen.     EEG Sleep: Sleep was not captured.    ACTIVATION PROCEDURES:   Intermittent Photic stimulation was performed in a stepwise fashion from 1 to 30 Hz and did not elicited any abnormalities on EEG.      ICTAL AND INTERICTAL FINDINGS:   No focal or generalized epileptiform activity noted.     No regional slowing was seen during this routine study.      No definite electrographic or electroclinical seizures.     EKG: Sampling of the EKG recording did not demonstrate any abnormalities      EVENTS:  None    INTERPRETATION:   Normal video EEG recording in the awake and drowsy state(s):  - No persistent focal asymmetries seen.  - No epileptiform discharges. No other epileptiform phenomena, including seizures were seen .      Note: This EEG does not rule out epilepsy.  If the clinical suspicion remains high for seizures, a prolonged recording to capture clinical or subclinical events may be helpful.        Jerry Cantrell,  MD  Department of Neurology at [facility]  General Neurologist and Epileptologist  Director of [facility]'s Level III Comprehensive Epilepsy Program  Professor of Clinical Neurology, [facility].   Phone: [phone]  Fax: [phone]  E-mail: [email or web address]

## 2022-07-11 NOTE — ED TRIAGE NOTES
BIBA from [facility]. [facility] employees found Pt on the ground in a [id number]. [facility] employees removed Pt and called EMS. Pt does not recall event and Pt had large bowel movement in his pants. Pt is alert to himself, date, and location. Does not recall event.

## 2022-07-11 NOTE — ASSESSMENT & PLAN NOTE
Slightly elevated troponin with chest pain consistently for the past 3 days, unlikely to be focal cardiac origin. No dynamic EKG changes.

## 2022-07-11 NOTE — ASSESSMENT & PLAN NOTE
Self-reported history of hyperthyroidism previously on methimazole, however no history on file  - TSH/T4

## 2022-07-11 NOTE — PROGRESS NOTES
Monitor summary  Rhythm: SR  Ectopy: N/A  HR: 76-95  .16/.08/.39  Per strip from monitor room

## 2022-07-11 NOTE — ASSESSMENT & PLAN NOTE
Patient reportedly found down at the [facility] campus on the fredo potty.  Patient does not remember episode or event.  Last memory going to the bathroom for bowel movement.  - Appears to be more syncope versus seizure (no reported urinary incontinence, tongue biting, postictal state, however structural changes on CT of brain puts patient at risk), also patient has history of hypothyroidism but given risk of thyroid storm/thyrotoxicosis  - Differential including cardiogenic or vasovagal or hypoxemia, less likely neurogenic or orthostatic  - Telemetry  - CT of chest to rule out pulmonary embolism  - TSH/T4

## 2022-07-11 NOTE — PROGRESS NOTES
Received report from [facility]. This pt is AOx4, voiding adequately, 0/10 pain. Patient and RN discussed plan of care: questions answered. Labs noted, assessment complete, patient tolerating regular diet.. Pt is on 2 L of O2 via nasal cannula. Call light in place, fall precautions in place, patient educated on importance of calling for assistance. No additional needs at this time. VSS

## 2022-07-11 NOTE — ED PROVIDER NOTES
"ED Provider Note    CHIEF COMPLAINT  Chief Complaint   Patient presents with   • Fall     BIBA from [facility]. [facility] employees found Pt on the ground in a [id number]. [facility] employees removed Pt and called EMS. Pt does not recall event and Pt had large bowel movement in his pants. Pt is alert to himself, date, and location. Does not recall event.       HPI  James Hurley is a 61 y.o. male who presents with a chief complaint of fall.  Apparently patient was found on the ground in a portable toilet at the [facility].  The patient does not remember the event although states that his legs are occasionally weak.  He reportedly had been incontinent of stool.  He denies seizure history.  He denies any fever but states that he has had chest pain and cough productive of yellowish/green sputum for the past 3 days with associated shortness of breath.  He has not taken any medication for his symptoms.  He denies any abdominal pain, nausea, vomiting, diarrhea, or dysuria.  Patient does drink alcohol daily, typically 2 beers and smokes cigarettes but denies any drug use.    REVIEW OF SYSTEMS  See HPI for further details.  Fall.  Cough.  Chest pain.  Shortness of breath.  All other systems are negative.     PAST MEDICAL HISTORY   has a past medical history of Hypothyroid.    SOCIAL HISTORY  Social History     Tobacco Use   • Smoking status: Current Every Day Smoker   • Smokeless tobacco: Current User   Substance and Sexual Activity   • Alcohol use: Yes   • Drug use: Not Currently   • Sexual activity: Not on file       SURGICAL HISTORY  patient denies any surgical history    CURRENT MEDICATIONS  Home Medications    **Home medications have not yet been reviewed for this encounter**         ALLERGIES  No Known Allergies    PHYSICAL EXAM  VITAL SIGNS: BP (!) 144/88   Pulse (!) 122   Temp (!) 38.6 °C (101.5 °F) (Oral)   Resp (!) 31   Ht 1.803 m (5' 11\")   Wt 65.8 kg (145 lb)   SpO2 (!) 85%   BMI 20.22 kg/m²  "   Pulse ox interpretation: I interpret this pulse ox as normal.  Constitutional: Alert in no apparent distress.  HENT: No signs of trauma, Bilateral external ears normal, Nose normal.  Dry mucous membranes.  Eyes: Pupils are equal and reactive, Conjunctiva normal, Non-icteric.   Neck: Normal range of motion, No tenderness, Supple, No stridor.   Lymphatic: No lymphadenopathy noted.   Cardiovascular: Tachycardic with regular rhythm, no murmurs. Pulses symmetrical.  Thorax & Lungs: Normal breath sounds, No respiratory distress, No wheezing, No chest tenderness.   Abdomen: Bowel sounds normal, Soft, No tenderness, No masses, No pulsatile masses. No peritoneal signs.  Skin: Warm, Dry, No erythema, No rash.   Back: Normal alignment.  Extremities: Intact distal pulses, No edema, No tenderness, No cyanosis.  Musculoskeletal: Good range of motion in all major joints. No tenderness to palpation or major deformities noted.   Neurologic: Alert, Normal motor function, Normal sensory function, No focal deficits noted.   Psychiatric: Affect normal, Judgment normal, Mood normal.     DIAGNOSTIC STUDIES / PROCEDURES    LABS  Results for orders placed or performed during the hospital encounter of 07/10/22   Lactic acid (lactate)   Result Value Ref Range    Lactic Acid 3.2 (H) 0.5 - 2.0 mmol/L   Lactic acid (lactate): Repeat if initial lactic acid result is greater than 2   Result Value Ref Range    Lactic Acid 1.1 0.5 - 2.0 mmol/L   CBC WITH DIFFERENTIAL   Result Value Ref Range    WBC 11.7 (H) 4.8 - 10.8 K/uL    RBC 5.01 4.70 - 6.10 M/uL    Hemoglobin 17.3 14.0 - 18.0 g/dL    Hematocrit 49.0 42.0 - 52.0 %    MCV 97.8 81.4 - 97.8 fL    MCH 34.5 (H) 27.0 - 33.0 pg    MCHC 35.3 33.7 - 35.3 g/dL    RDW 46.2 35.9 - 50.0 fL    Platelet Count 258 164 - 446 K/uL    MPV 8.6 (L) 9.0 - 12.9 fL    Neutrophils-Polys 79.70 (H) 44.00 - 72.00 %    Lymphocytes 6.80 (L) 22.00 - 41.00 %    Monocytes 10.80 0.00 - 13.40 %    Eosinophils 1.70 0.00 - 6.90  %    Basophils 0.70 0.00 - 1.80 %    Immature Granulocytes 0.30 0.00 - 0.90 %    Nucleated RBC 0.00 /100 WBC    Neutrophils (Absolute) 9.31 (H) 1.82 - 7.42 K/uL    Lymphs (Absolute) 0.80 (L) 1.00 - 4.80 K/uL    Monos (Absolute) 1.26 (H) 0.00 - 0.85 K/uL    Eos (Absolute) 0.20 0.00 - 0.51 K/uL    Baso (Absolute) 0.08 0.00 - 0.12 K/uL    Immature Granulocytes (abs) 0.04 0.00 - 0.11 K/uL    NRBC (Absolute) 0.00 K/uL   COMP METABOLIC PANEL   Result Value Ref Range    Sodium 138 135 - 145 mmol/L    Potassium 4.4 3.6 - 5.5 mmol/L    Chloride 105 96 - 112 mmol/L    Co2 21 20 - 33 mmol/L    Anion Gap 12.0 7.0 - 16.0    Glucose 137 (H) 65 - 99 mg/dL    Bun 8 8 - 22 mg/dL    Creatinine 0.76 0.50 - 1.40 mg/dL    Calcium 9.1 8.5 - 10.5 mg/dL    AST(SGOT) 15 12 - 45 U/L    ALT(SGPT) 14 2 - 50 U/L    Alkaline Phosphatase 115 (H) 30 - 99 U/L    Total Bilirubin 0.4 0.1 - 1.5 mg/dL    Albumin 4.2 3.2 - 4.9 g/dL    Total Protein 7.3 6.0 - 8.2 g/dL    Globulin 3.1 1.9 - 3.5 g/dL    A-G Ratio 1.4 g/dL   URINALYSIS    Specimen: Urine   Result Value Ref Range    Color Yellow     Character Clear     Specific Gravity 1.012 <1.035    Ph 6.5 5.0 - 8.0    Glucose Negative Negative mg/dL    Ketones Negative Negative mg/dL    Protein Negative Negative mg/dL    Bilirubin Negative Negative    Urobilinogen, Urine 1.0 Negative    Nitrite Negative Negative    Leukocyte Esterase Negative Negative    Occult Blood Negative Negative    Micro Urine Req see below    CoV-2, FLU A/B, and RSV by PCR (2-4 Hours CEPHEID) : Collect NP swab in VTM    Specimen: Respirate   Result Value Ref Range    Influenza virus A RNA Negative Negative    Influenza virus B, PCR Negative Negative    RSV, PCR Negative Negative    SARS-CoV-2 by PCR NotDetected     SARS-CoV-2 Source NP Swab    TROPONIN   Result Value Ref Range    Troponin T 22 (H) 6 - 19 ng/L   ESTIMATED GFR   Result Value Ref Range    GFR (CKD-EPI) 103 >60 mL/min/1.73 m 2   DIAGNOSTIC ALCOHOL   Result Value Ref  Range    Diagnostic Alcohol <10.1 <10.1 mg/dL   proBrain Natriuretic Peptide, NT   Result Value Ref Range    NT-proBNP 67 0 - 125 pg/mL   PROCALCITONIN   Result Value Ref Range    Procalcitonin 0.05 <0.25 ng/mL   EKG   Result Value Ref Range    Report       [facility] Emergency [facility].    Test Date:  2022-07-10  Pt Name:    ORLIN STAPLES                  Department: ER  MRN:        [id number]                      Room:        20  Gender:     Male                         Technician: [id number]  :        1960                   Requested By:ANDREAS ACEVEDO  Order #:    [id number]                    Reading MD: Andreas Acevedo MD    Measurements  Intervals                                Axis  Rate:       115                          P:          12  NM:         144                          QRS:        72  QRSD:       72                           T:          48  QT:         316  QTc:        437    Interpretive Statements  SINUS TACHYCARDIA  LOW VOLTAGE IN FRONTAL LEADS  No previous ECG available for comparison  Electronically Signed On 7- 22:06:27 PDT by Andreas Acevedo MD     POCT glucose device results   Result Value Ref Range    POC Glucose, Blood 139 (H) 65 - 99 mg/dL     RADIOLOGY  CT-HEAD W/O   Final Result         1.  No acute intracranial findings. No evidence of acute hemorrhage or mass lesion.      2. Cerebral atrophy.      3.  White matter lucencies most consistent with chronic small vessel ischemic change.      4. Encephalomalacia in the left parietal and occipital region with ex vacuo enlargement of the posterior horn of the left lateral ventricle, similar to prior.               DX-CHEST-PORTABLE (1 VIEW)   Final Result         Mild interstitial prominence could relate to fluid overload or atypical infection.      CT-CTA CHEST PULMONARY ARTERY W/ RECONS    (Results Pending)     COURSE & MEDICAL DECISION MAKING  Pertinent Labs & Imaging studies reviewed. (See chart for details)  This is a  david 61-year-old undomiciled gentleman who was brought here from the [facility] after he was found down.  Patient reports that he occasionally has bilateral leg weakness and falls and suspects that is what happened tonight but has no real memory of the incident.  He does state that he has had some chest pain with a cough productive of greenish/yellow sputum over the course of the past 3 days.  He has needed oxygen in the past but has not been using it for quite some time now.  Differential diagnosis includes, but is not limited to, pneumonia, viral syndrome, danny-/myocarditis, ACS, PE.    Patient is tachycardic, febrile, hypoxic and requiring 2 L supplemental oxygen.  He has no lower extremity swelling or hemoptysis.  Denies any recent long distance travel or surgeries.  No history of DVT/PE.  There is no pleuritic component to his chest pain.  Chest x-ray suggests atypical infection versus fluid overload.  No evidence for fluid overload presently.  Patient will be given a dose of azithromycin.    CBC demonstrates leukocytosis with left shift.  Metabolic panel reveals hyperglycemia to 137 and is otherwise normal.  Lactic acid is slightly elevated at 3.2.  Diagnostic alcohol is negative.  Troponin slightly elevated to 22.  EKG is nonischemic.  BNP is negative.  Urinalysis does not suggest infection.  Procalcitonin is negative.  Viral panel is pending.  Patient will require hospitalization for additional work-up and management.     CT head was performed to rule out acute traumatic injury as he was found down and thankfully, this does not demonstrate acute traumatic abnormality.    Discussed with the hospitalist and admitted in guarded condition.    HYDRATION  HYDRATION: Based on the patient's presentation of Sepsis the patient was given IV fluids. IV Hydration was used because oral hydration was not adequate alone. Upon recheck following hydration, the patient was improved.    FINAL IMPRESSION  1. Atypical  pneumonia  2. Hypoxia    Electronically signed by: Herson Sanches M.D., 7/10/2022 7:01 PM

## 2022-07-11 NOTE — ASSESSMENT & PLAN NOTE
Sepsis Present on admission  SIRS criteria identified on my evaluation include: Fever, with temperature greater than 101 deg F, Tachycardia, with heart rate greater than 90 BPM and Tachypnea, with respirations greater than 20 per minute  Source was likely lung: chest x-ray revealing increased interstitial prominence suggesting atypical pneumonia  - Blood cultures x2 negative, patient denies sputum production  - Converting to oral augmentin 875-125 to finish course of empiric abx

## 2022-07-11 NOTE — ASSESSMENT & PLAN NOTE
Patient reportedly found down at the [facility] campus on the fredo potty.  Patient does not remember episode or event.  Last memory going to the bathroom for bowel movement. CT of chest w/o PE, T4 w/n/l. EEG w/n/l so far. Collateral from patient's friend favors vasovagal syndrome.  - Appears to be more syncope versus seizure (no reported urinary incontinence during episode, tongue biting, postictal state, however structural changes on CT of brain puts patient at risk)  - OK to d/c Telemetry & transfer to neuro [facility] if beds are available

## 2022-07-11 NOTE — ASSESSMENT & PLAN NOTE
Encephalomalacia ex vacuo of left parietal and occipital lobes, unknown timeframe, patient denies prior seizures and stroke.  Present on CT scan

## 2022-07-11 NOTE — ASSESSMENT & PLAN NOTE
This is Sepsis Present on admission  SIRS criteria identified on my evaluation include: Fever, with temperature greater than 101 deg F, Tachycardia, with heart rate greater than 90 BPM and Tachypnea, with respirations greater than 20 per minute  Source is lung  Sepsis protocol initiated  Fluid resuscitation ordered per protocol  Crystalloid Fluid Administration: Fluid resuscitation ordered per standard protocol - 30 mL/kg per current or ideal body weight  IV antibiotics as appropriate for source of sepsis  Reassessment: I have reassessed the patient's hemodynamic status    - Chest x-ray revealing increased interstitial prominence suggesting atypical pneumonia  - Blood cultures x2, patient denies sputum production  - Ceftriaxone and azithromycin for empiric coverage  - Previously requiring 2 L oxygen in the emergency department, baseline room air

## 2022-07-11 NOTE — ASSESSMENT & PLAN NOTE
Encephalomalacia ex vacuo of left parietal and occipital lobes, unknown timeframe, patient denies prior seizures and stroke. Stable from previous CT scan. May also be related to NPH.  - Requested records from outpt clinic in [place].

## 2022-07-11 NOTE — ASSESSMENT & PLAN NOTE
Patient slightly altered at bedside, likely secondary to hypoxia/loss of consciousness  - Rule out hyperthyroidism/thyrotoxicosis

## 2022-07-11 NOTE — ASSESSMENT & PLAN NOTE
Slightly elevated troponin with chest pain consistently for the past 3 days, unlikely to be focal cardiac origin   for proper ROM  Held 6/28 due to pain   Semireclined AAROM flex with wand  2x10   5\" 2 10 Added 4/5 ^ sets 5/14 Cueing for proper planar motion Held 6/28 due to pain     Added back in 4/16  Increased sets 4/30   Table Slides   abd 5\" 2 10 Added 4/9 cueing for going into end range  Held 6/28 due to pain    5\" 1 10 Added 4/16 Abd added 6/20  Cued for reducing shoulder shrug. 2lb 3 10 Added 4/23 ^ 5/14 cueing for full ROM, and elbow positioning      3\" 2 10 Added 6/18 Held 7/2 due to recent fall    green 3 10 Added 6/18 Cued for elbow position  Held 6/28 due to pain, and 7/2     2 10 Added 6/20, Held 7/2     2 10 Added 6/28                        Neuromuscular Re-ed / Therapeutic Activities                                                         Manual Intervention 14'       Shld /GH Mobs       Post Cap mobs       Thoracic/Rib manipulation       STM       PROM MT Gentle Flex, ER,  abd 14'  Within protocol ROM limits              Therapeutic Exercise and NMR EXR  X  (55533) Provided verbal/tactile cueing for activities related to strengthening, flexibility, endurance, ROM  for improvements in scapular, scapulothoracic and UE control with self care, reaching, carrying, lifting, house/yardwork, driving/computer work. ? (94223) Provided verbal/tactile cueing for activities related to improving balance, coordination, kinesthetic sense, posture, motor skill, proprioception  to assist with  scapular, scapulothoracic and UE control with self care, reaching, carrying, lifting, house/yardwork, driving/computer work. Therapeutic Activities:      (53485 or 08036) Provided verbal/tactile cueing for activities related to improving balance, coordination, kinesthetic sense, posture, motor skill, proprioception and motor activation to allow for proper function of scapular, scapulothoracic and UE control with self care, carrying, lifting, driving/computer work.      Home Exercise Program:    ? (31367) Reviewed/Progressed HEP

## 2022-07-11 NOTE — ASSESSMENT & PLAN NOTE
Pleuritic chest pain present with patient for the past 3 days.  No history of blood clots, however wells score intermediate at 4.5. CTA chest w/o significant findings. May represent costochondral injury 2/2 ground level fall.  - Troponin slightly elevated, peaked at around 40.  - No signs of ischemia on EKG

## 2022-07-11 NOTE — PROGRESS NOTES
Patient continues to remove tele leads and pulse ox.  Patient educated on the importance.  Reinforcement needed.

## 2022-07-11 NOTE — ASSESSMENT & PLAN NOTE
Self-reported history of hyperthyroidism previously on methimazole ~4 months, however no history on file. T4 w/n/l despite non-detectable TSH.   - Records requested from outpt clinic in [place]  - Considering consult with endocrinology

## 2022-07-11 NOTE — ED NOTES
Med rec updated and complete. Allergies reviewed.  Pt denies taking medications.  Confirmed name and date of birth.      [place] [facility] [phone]

## 2022-07-11 NOTE — H&P
Date of Admission: 7/10/2022  Admission Status: Emergency  [facility] Team: MARIO  Attending: Herson Sanches M.D.   Resident: Dr. Marcial  Contact Number: [phone]    Chief Complaint: Loss of consciounsness     History of Present Illness (HPI):   Patient is a 61-year-old male with past medical history of hyperthyroidism previously on methimazole who presented to the ED 7/10 for loss of consciousness.  Patient was brought in from the [facility] after being found down while in the bathroom.  Patient does not remember episode, last remembers going to the bathroom for a bowel movement and woke up in the ED.  Patient states that he had chest pain for the past 3 days, progressively worsening, sharp 10 out of 10, right-sided, wax and wane, that is worse with a deep breath.  No radiation.  Does state he has fever, chills, shortness of breath, and numbness in his feet.  Denies sputum production, palpitations, history of blood clots, saddle anesthesia, tongue biting, or urinary incontinence.    In ED: Temperature 38.6 °C, pulse 124, respiratory rate 22, blood pressure 153/85, oxygen saturation 91% on 2 L  Labs: WBC 11.7, lactic acid 3.2, glucose 137, Pro-Jamie 0.05  CT head without contrast: No acute intracranial findings, no acute hemorrhage, encephalomalacia with ex vacuo  Urinalysis unremarkable    Review of Systems:   Review of Systems   Constitutional: Positive for chills and fever.   HENT: Negative for congestion.    Respiratory: Positive for shortness of breath. Negative for cough, hemoptysis and wheezing.    Cardiovascular: Negative for chest pain and palpitations.   Gastrointestinal: Negative for abdominal pain, nausea and vomiting.   Genitourinary: Negative for dysuria and urgency.   Musculoskeletal: Negative for myalgias.   Neurological: Positive for loss of consciousness. Negative for headaches.   Endo/Heme/Allergies: Negative for polydipsia.   Psychiatric/Behavioral: Negative for substance abuse.       Past  Medical History:   Past Medical History was reviewed with patient.   has a past medical history of Hypothyroid.    Past Surgical History: Past Surgical History was reviewed with patient.   has no past surgical history on file.    Medications: Medications have been reviewed with patient.  None        Allergies: Allergies have been reviewed with patient.  No Known Allergies    Family History: Denies  family history is not on file.     Social History:   Tobacco: 10 cigars/day for past 5 yrs   Alcohol: 1-2 beers daily   Recreational drugs (illegal and prescription):  Denies   Living situation:  Lives at [facility]    Primary Care Provider: reviewed Pcp Pt States None    Physical Exam:   Vitals:  Temp:  [37.3 °C (99.1 °F)-38.6 °C (101.5 °F)] 37.3 °C (99.1 °F)  Pulse:  [122-132] 124  Resp:  [22-31] 22  BP: (144-153)/(85-88) 153/85  SpO2:  [85 %-91 %] 91 %    Physical Exam  Vitals and nursing note reviewed.   Constitutional:       General: He is not in acute distress.  HENT:      Mouth/Throat:      Mouth: Mucous membranes are moist.   Eyes:      General: No scleral icterus.     Extraocular Movements: Extraocular movements intact.      Pupils: Pupils are equal, round, and reactive to light.   Cardiovascular:      Rate and Rhythm: Normal rate and regular rhythm.      Heart sounds: No murmur heard.    No friction rub. No gallop.   Pulmonary:      Effort: Pulmonary effort is normal. No respiratory distress.      Breath sounds: Decreased air movement present. Examination of the right-lower field reveals rales. Examination of the left-lower field reveals rales. Rales present.   Chest:      Chest wall: No tenderness.   Abdominal:      General: Abdomen is flat. Bowel sounds are normal. There is no distension.      Palpations: Abdomen is soft.      Tenderness: There is no abdominal tenderness.   Musculoskeletal:         General: No tenderness.      Cervical back: Neck supple.      Right lower leg: No edema.      Left lower leg:  No edema.   Skin:     General: Skin is warm.   Neurological:      Mental Status: He is alert.      Cranial Nerves: No cranial nerve deficit.      Motor: Weakness present.         Labs:   HEMATOLOGY/ ONCOLOGY/ID:            Recent Labs     07/10/22  1901   WBC 11.7*   RBC 5.01   HEMOGLOBIN 17.3   HEMATOCRIT 49.0   MCV 97.8   MCH 34.5*   RDW 46.2   PLATELETCT 258   MPV 8.6*   NEUTSPOLYS 79.70*   LYMPHOCYTES 6.80*   MONOCYTES 10.80   EOSINOPHILS 1.70   BASOPHILS 0.70     No results found for: OWIPOVEX30, FOLATE, FERRITIN, IRON, TOTIRONBC    RENAL:        Estimated GFR/CRCL = Estimated Creatinine Clearance: 97.4 mL/min (by C-G formula based on SCr of 0.76 mg/dL).  Recent Labs     07/10/22  1901   SODIUM 138   POTASSIUM 4.4   CHLORIDE 105   CO2 21   GLUCOSE 137*   BUN 8   CREATININE 0.76   CALCIUM 9.1   ALBUMIN 4.2       GASTROINTESTINAL/ HEPATIC:          Recent Labs     07/10/22  1901   ALTSGPT 14   ASTSGOT 15   ALKPHOSPHAT 115*   TBILIRUBIN 0.4   ALBUMIN 4.2   GLOBULIN 3.1     No results found for: AMMONIA    ENDOCRINE:              Recent Labs     07/10/22  1901   GLUCOSE 137*     No results found for: HBA1C, TSH, FREET4, FREET3, CORTISOL     Imaging:   CT-CTA CHEST PULMONARY ARTERY W/ RECONS   Final Result      1.  No pulmonary embolus. No acute abnormality in the chest.   2.  Emphysema.         CT-HEAD W/O   Final Result         1.  No acute intracranial findings. No evidence of acute hemorrhage or mass lesion.      2. Cerebral atrophy.      3.  White matter lucencies most consistent with chronic small vessel ischemic change.      4. Encephalomalacia in the left parietal and occipital region with ex vacuo enlargement of the posterior horn of the left lateral ventricle, similar to prior.               DX-CHEST-PORTABLE (1 VIEW)   Final Result         Mild interstitial prominence could relate to fluid overload or atypical infection.           Previous Data Review: reviewed    Assessment/Plan:    I anticipate this  patient will need at least 2 midnight overnight stays due to sepsis, syncope and collapse, and acute metabolic encephalopathy. Will need to follow blood cultures, IV fluids and antibiotics, as well as telemetry monitoring for possible arrhythmias.    * Syncope and collapse- (present on admission)  Assessment & Plan  Patient reportedly found down at the [facility] on the fredo potty.  Patient does not remember episode or event.  Last memory going to the bathroom for bowel movement.  - Appears to be more syncope versus seizure (no reported urinary incontinence, tongue biting, postictal state, however structural changes on CT of brain puts patient at risk), also patient has history of hypothyroidism but given risk of thyroid storm/thyrotoxicosis  - Differential including cardiogenic or vasovagal or hypoxemia, less likely neurogenic or orthostatic  - Telemetry  - CT of chest to rule out pulmonary embolism  - TSH/T4    Sepsis (HCC)- (present on admission)  Assessment & Plan  This is Sepsis Present on admission  SIRS criteria identified on my evaluation include: Fever, with temperature greater than 101 deg F, Tachycardia, with heart rate greater than 90 BPM and Tachypnea, with respirations greater than 20 per minute  Source is lung  Sepsis protocol initiated  Fluid resuscitation ordered per protocol  Crystalloid Fluid Administration: Fluid resuscitation ordered per standard protocol - 30 mL/kg per current or ideal body weight  IV antibiotics as appropriate for source of sepsis  Reassessment: I have reassessed the patient's hemodynamic status    - Chest x-ray revealing increased interstitial prominence suggesting atypical pneumonia  - Blood cultures x2, patient denies sputum production  - Ceftriaxone and azithromycin for empiric coverage  - Previously requiring 2 L oxygen in the emergency department, baseline room air      Acute hypoxemic respiratory failure (HCC)- (present on admission)  Assessment & Plan  Patient  requiring 2 L on admission, see sepsis for further management    Atypical pneumonia- (present on admission)  Assessment & Plan  Per chest x-ray, see sepsis    Encephalomalacia- (present on admission)  Assessment & Plan  Encephalomalacia ex vacuo of left parietal and occipital lobes, unknown timeframe, patient denies prior seizures and stroke.  Present on CT scan    Acute metabolic encephalopathy- (present on admission)  Assessment & Plan  Patient slightly altered at bedside, likely secondary to hypoxia/loss of consciousness  - Rule out hyperthyroidism/thyrotoxicosis    Elevated troponin- (present on admission)  Assessment & Plan  Slightly elevated troponin with chest pain consistently for the past 3 days, unlikely to be focal cardiac origin    Pleuritic chest pain- (present on admission)  Assessment & Plan  Pleuritic chest pain present with patient for the past 3 days.  No history of blood clots, however wells score intermediate at 4.5.   - CTA chest to rule out PE  - Troponin slightly elevated  - No signs of ischemia on EKG    History of hyperthyroidism- (present on admission)  Assessment & Plan  Self-reported history of hyperthyroidism previously on methimazole, however no history on file  - TSH/T4    Lactic acidosis- resolved- (present on admission)  Assessment & Plan  Lactic acid 3.2 on admission, fluid resuscitation, antibiotics, trend       Code status: Full  Tubes/lines/drains: PIV  Diet: regular  DVT PPX: Lovenox  GI PPX: Bowel regimen  Disposition: TBD    I spent 17 minutes at bedside with patient discussing work-up, results, diagnosis, prognosis.  CODE STATUS discussed with patient and wants to be full code    This note was generated using voice recognition software which has a small chance of producing errors of grammar and possibly content. I have made every reasonable attempt to find and correct any obvious errors, but expect that some may not be found prior to finalization of this note.

## 2022-07-11 NOTE — PROGRESS NOTES
0700  · Assumed care; report received from Veronica  · Pt 2 person assist; gait unsteady  · Pt 92% on 1L NC  · Redness to coccyx but blanching w/ scab  · Scattered abrasions   · Pt tolerating a regular diet.     1056  Voalte message sent to Dr Leon with trop results. Pt still c/o of right sided chest pain, sharp.     1206  Report called to Mariaa [id number] [phone].

## 2022-07-11 NOTE — ASSESSMENT & PLAN NOTE
Pleuritic chest pain present with patient for the past 3 days.  No history of blood clots, however wells score intermediate at 4.5.   - CTA chest to rule out PE  - Troponin slightly elevated  - No signs of ischemia on EKG

## 2022-07-12 PROBLEM — R26.0 ATAXIC GAIT: Status: ACTIVE | Noted: 2022-07-12

## 2022-07-12 PROBLEM — G91.2 NORMAL PRESSURE HYDROCEPHALUS (HCC): Status: ACTIVE | Noted: 2022-07-12

## 2022-07-12 LAB
ANION GAP SERPL CALC-SCNC: 12 MMOL/L (ref 7–16)
BASOPHILS # BLD AUTO: 0.9 % (ref 0–1.8)
BASOPHILS # BLD: 0.07 K/UL (ref 0–0.12)
BUN SERPL-MCNC: 8 MG/DL (ref 8–22)
CALCIUM SERPL-MCNC: 8.8 MG/DL (ref 8.5–10.5)
CHLORIDE SERPL-SCNC: 101 MMOL/L (ref 96–112)
CO2 SERPL-SCNC: 20 MMOL/L (ref 20–33)
CREAT SERPL-MCNC: 0.65 MG/DL (ref 0.5–1.4)
EOSINOPHIL # BLD AUTO: 0.04 K/UL (ref 0–0.51)
EOSINOPHIL NFR BLD: 0.5 % (ref 0–6.9)
ERYTHROCYTE [DISTWIDTH] IN BLOOD BY AUTOMATED COUNT: 47.2 FL (ref 35.9–50)
GFR SERPLBLD CREATININE-BSD FMLA CKD-EPI: 108 ML/MIN/1.73 M 2
GLUCOSE SERPL-MCNC: 87 MG/DL (ref 65–99)
HCT VFR BLD AUTO: 46.9 % (ref 42–52)
HGB BLD-MCNC: 16.2 G/DL (ref 14–18)
IMM GRANULOCYTES # BLD AUTO: 0.02 K/UL (ref 0–0.11)
IMM GRANULOCYTES NFR BLD AUTO: 0.3 % (ref 0–0.9)
LYMPHOCYTES # BLD AUTO: 1.09 K/UL (ref 1–4.8)
LYMPHOCYTES NFR BLD: 14.7 % (ref 22–41)
MCH RBC QN AUTO: 34.4 PG (ref 27–33)
MCHC RBC AUTO-ENTMCNC: 34.5 G/DL (ref 33.7–35.3)
MCV RBC AUTO: 99.6 FL (ref 81.4–97.8)
MONOCYTES # BLD AUTO: 0.63 K/UL (ref 0–0.85)
MONOCYTES NFR BLD AUTO: 8.5 % (ref 0–13.4)
NEUTROPHILS # BLD AUTO: 5.58 K/UL (ref 1.82–7.42)
NEUTROPHILS NFR BLD: 75.1 % (ref 44–72)
NRBC # BLD AUTO: 0 K/UL
NRBC BLD-RTO: 0 /100 WBC
PLATELET # BLD AUTO: 188 K/UL (ref 164–446)
PMV BLD AUTO: 9.2 FL (ref 9–12.9)
POTASSIUM SERPL-SCNC: 4 MMOL/L (ref 3.6–5.5)
RBC # BLD AUTO: 4.71 M/UL (ref 4.7–6.1)
SODIUM SERPL-SCNC: 133 MMOL/L (ref 135–145)
WBC # BLD AUTO: 7.4 K/UL (ref 4.8–10.8)

## 2022-07-12 PROCEDURE — A9270 NON-COVERED ITEM OR SERVICE: HCPCS | Performed by: INTERNAL MEDICINE

## 2022-07-12 PROCEDURE — 700102 HCHG RX REV CODE 250 W/ 637 OVERRIDE(OP): Performed by: INTERNAL MEDICINE

## 2022-07-12 PROCEDURE — 36415 COLL VENOUS BLD VENIPUNCTURE: CPT

## 2022-07-12 PROCEDURE — 80048 BASIC METABOLIC PNL TOTAL CA: CPT

## 2022-07-12 PROCEDURE — 770020 HCHG ROOM/CARE - TELE (206)

## 2022-07-12 PROCEDURE — 85025 COMPLETE CBC W/AUTO DIFF WBC: CPT

## 2022-07-12 PROCEDURE — 700111 HCHG RX REV CODE 636 W/ 250 OVERRIDE (IP): Performed by: INTERNAL MEDICINE

## 2022-07-12 PROCEDURE — 99233 SBSQ HOSP IP/OBS HIGH 50: CPT | Mod: GC | Performed by: INTERNAL MEDICINE

## 2022-07-12 PROCEDURE — 62270 DX LMBR SPI PNXR: CPT

## 2022-07-12 PROCEDURE — 700111 HCHG RX REV CODE 636 W/ 250 OVERRIDE (IP)

## 2022-07-12 PROCEDURE — 700101 HCHG RX REV CODE 250: Performed by: STUDENT IN AN ORGANIZED HEALTH CARE EDUCATION/TRAINING PROGRAM

## 2022-07-12 RX ORDER — ACETAMINOPHEN 325 MG/1
650 TABLET ORAL EVERY 4 HOURS PRN
Status: DISCONTINUED | OUTPATIENT
Start: 2022-07-12 | End: 2022-07-16 | Stop reason: HOSPADM

## 2022-07-12 RX ORDER — MORPHINE SULFATE 4 MG/ML
1 INJECTION INTRAVENOUS ONCE
Status: COMPLETED | OUTPATIENT
Start: 2022-07-12 | End: 2022-07-12

## 2022-07-12 RX ADMIN — ENOXAPARIN SODIUM 40 MG: 40 INJECTION SUBCUTANEOUS at 16:05

## 2022-07-12 RX ADMIN — MORPHINE SULFATE 1 MG: 4 INJECTION INTRAVENOUS at 18:46

## 2022-07-12 RX ADMIN — CEFTRIAXONE SODIUM 2 G: 10 INJECTION, POWDER, FOR SOLUTION INTRAVENOUS at 05:47

## 2022-07-12 RX ADMIN — AZITHROMYCIN MONOHYDRATE 500 MG: 250 TABLET ORAL at 05:47

## 2022-07-12 RX ADMIN — LIDOCAINE HYDROCHLORIDE 20 ML: 10 INJECTION, SOLUTION INFILTRATION; PERINEURAL at 18:48

## 2022-07-12 ASSESSMENT — ENCOUNTER SYMPTOMS
CARDIOVASCULAR NEGATIVE: 1
EYES NEGATIVE: 1
FEVER: 1
COUGH: 1
WHEEZING: 0
MUSCULOSKELETAL NEGATIVE: 1
DIAPHORESIS: 0
WEIGHT LOSS: 0
CONSTIPATION: 0
SPUTUM PRODUCTION: 0
LOSS OF CONSCIOUSNESS: 1
ABDOMINAL PAIN: 0
VOMITING: 1
HEMOPTYSIS: 0
BLOOD IN STOOL: 0
SORE THROAT: 0
PSYCHIATRIC NEGATIVE: 1
DIARRHEA: 1
NAUSEA: 1
PALPITATIONS: 0
SHORTNESS OF BREATH: 0
CHILLS: 1
FLANK PAIN: 0

## 2022-07-12 NOTE — PROGRESS NOTES
Bedside report received from day RN, pt care assumed, assessment completed. Pt is A&O 3, denies current pain at this time, SR on the monitor. Updated on POC, questions answered. Bed in lowest, locked position, treaded socks on, call light and belongings within reach. Fall precautions in place.

## 2022-07-12 NOTE — PROGRESS NOTES
Monitor Summary:     Rhythm: Sinus Rhythm    Rate: 66-95    Measurement: .16/.07/.37    Ectopy: R PVCs, 15 beats PSVT, 8 beats PSVT, 5 beats PSVT, Couplets, low of 42    12 hr cc

## 2022-07-12 NOTE — PROGRESS NOTES
Daily Progress Note:     Date of Service: 7/11/2022  Primary Team: [facility] Team   Attending: Say Massey M.D.   Senior Resident: Dr. Leon  Intern: Dr. Muñoz  Contact:  [phone]    Chief Complaint:   Found Down    Subjective: (Must contain at least 4 of the usual HPI elements (location, onset, duration, severity, modifying factors, radiation, character, timing) or if not possible the status of at least 3 chronic problems.)  The patient is a 60 y/o M with PMH hyperthyroidism off medication, who presented with >1 month history of regular fainting spells. The patient reports fainting ~1 time per week since moving from [place] a few months ago. These faints are not witnessed. Patient was found near the bathroom at the homeless shelter where he lives. Was not witnessed, does not recall preceding events or the fall. Only prodrome was light dizziness. The patient since endorses fever, nausea, vomiting, dry cough, and one episode of diarrhea. Patient also endorses pleuritic chest pain, worst with coughing. Patient denies dysuria, hematuria, melena, hematochezia. Since admission, the patient states he feels stable malaise and new chills. No new syncopal or seizure events overnight.    Consultants/Specialty:  Neurology   Normal video EEG recording in the awake and drowsy state(s):  - No persistent focal asymmetries seen.  - No epileptiform discharges. No other epileptiform phenomena, including seizures were seen .    Review of Systems:    Review of Systems   Constitutional: Positive for chills, fever and malaise/fatigue. Negative for diaphoresis and weight loss.   HENT: Negative.  Negative for hearing loss and sore throat.    Eyes: Negative.    Respiratory: Positive for cough. Negative for hemoptysis, sputum production, shortness of breath and wheezing.    Cardiovascular: Negative.  Negative for chest pain and palpitations.   Gastrointestinal: Positive for diarrhea, nausea and vomiting. Negative for abdominal  pain, blood in stool, constipation and melena.   Genitourinary: Negative.  Negative for dysuria, flank pain, hematuria and urgency.   Musculoskeletal: Negative.    Skin: Negative.    Neurological: Positive for loss of consciousness.   Endo/Heme/Allergies: Negative.    Psychiatric/Behavioral: Negative.        Objective Data:   Physical Exam:   Vitals:   Temp:  [37.1 °C (98.8 °F)-38.6 °C (101.5 °F)] 37.2 °C (99 °F)  Pulse:  [] 96  Resp:  [17-82] 18  BP: (126-156)/(85-95) 145/89  SpO2:  [85 %-95 %] 91 %    Physical Exam  Constitutional:       General: He is not in acute distress.     Appearance: He is normal weight.   HENT:      Head: Normocephalic and atraumatic.      Mouth/Throat:      Mouth: Mucous membranes are moist.      Pharynx: Oropharynx is clear.   Eyes:      Extraocular Movements: Extraocular movements intact.      Pupils: Pupils are equal, round, and reactive to light.   Cardiovascular:      Rate and Rhythm: Normal rate and regular rhythm.      Pulses: Normal pulses.      Heart sounds: Normal heart sounds, S1 normal and S2 normal.   Pulmonary:      Effort: Pulmonary effort is normal.      Breath sounds: Wheezing present.   Abdominal:      General: Abdomen is flat. Bowel sounds are normal.      Palpations: Abdomen is soft.      Tenderness: There is abdominal tenderness (in RUQ; palpable, smooth liver edge 2cm below costal margin).   Genitourinary:     Penis: Normal.       Testes: Normal.   Musculoskeletal:         General: Normal range of motion.      Cervical back: Normal range of motion.   Skin:     General: Skin is warm and dry.      Capillary Refill: Capillary refill takes less than 2 seconds.   Neurological:      General: No focal deficit present.      Mental Status: He is alert and oriented to person, place, and time.   Psychiatric:         Mood and Affect: Mood normal.         Thought Content: Thought content normal. Thought content does not include homicidal or suicidal ideation.       Labs:    Admission on 07/10/2022   Component Date Value   • Lactic Acid 07/10/2022 3.2 (A)   • Lactic Acid 07/10/2022 1.1    • WBC 07/10/2022 11.7 (A)   • RBC 07/10/2022 5.01    • Hemoglobin 07/10/2022 17.3    • Hematocrit 07/10/2022 49.0    • MCV 07/10/2022 97.8    • MCH 07/10/2022 34.5 (A)   • MCHC 07/10/2022 35.3    • RDW 07/10/2022 46.2    • Platelet Count 07/10/2022 258    • MPV 07/10/2022 8.6 (A)   • Neutrophils-Polys 07/10/2022 79.70 (A)   • Lymphocytes 07/10/2022 6.80 (A)   • Monocytes 07/10/2022 10.80    • Eosinophils 07/10/2022 1.70    • Basophils 07/10/2022 0.70    • Immature Granulocytes 07/10/2022 0.30    • Nucleated RBC 07/10/2022 0.00    • Neutrophils (Absolute) 07/10/2022 9.31 (A)   • Lymphs (Absolute) 07/10/2022 0.80 (A)   • Monos (Absolute) 07/10/2022 1.26 (A)   • Eos (Absolute) 07/10/2022 0.20    • Baso (Absolute) 07/10/2022 0.08    • Immature Granulocytes (a* 07/10/2022 0.04    • NRBC (Absolute) 07/10/2022 0.00    • Sodium 07/10/2022 138    • Potassium 07/10/2022 4.4    • Chloride 07/10/2022 105    • Co2 07/10/2022 21    • Anion Gap 07/10/2022 12.0    • Glucose 07/10/2022 137 (A)   • Bun 07/10/2022 8    • Creatinine 07/10/2022 0.76    • Calcium 07/10/2022 9.1    • AST(SGOT) 07/10/2022 15    • ALT(SGPT) 07/10/2022 14    • Alkaline Phosphatase 07/10/2022 115 (A)   • Total Bilirubin 07/10/2022 0.4    • Albumin 07/10/2022 4.2    • Total Protein 07/10/2022 7.3    • Globulin 07/10/2022 3.1    • A-G Ratio 07/10/2022 1.4    • Color 07/10/2022 Yellow    • Character 07/10/2022 Clear    • Specific Gravity 07/10/2022 1.012    • Ph 07/10/2022 6.5    • Glucose 07/10/2022 Negative    • Ketones 07/10/2022 Negative    • Protein 07/10/2022 Negative    • Bilirubin 07/10/2022 Negative    • Urobilinogen, Urine 07/10/2022 1.0    • Nitrite 07/10/2022 Negative    • Leukocyte Esterase 07/10/2022 Negative    • Occult Blood 07/10/2022 Negative    • Micro Urine Req 07/10/2022 see below    • Significant Indicator 07/10/2022 NEG     • Source 07/10/2022 BLD    • Site 07/10/2022 PERIPHERAL    • Culture Result 07/10/2022                      Value:No Growth  Note: Blood cultures are incubated for 5 days and  are monitored continuously.Positive blood cultures  are called to the RN and reported as soon as  they are identified.     • Significant Indicator 07/10/2022 NEG    • Source 07/10/2022 BLD    • Site 07/10/2022 PERIPHERAL    • Culture Result 07/10/2022                      Value:No Growth  Note: Blood cultures are incubated for 5 days and  are monitored continuously.Positive blood cultures  are called to the RN and reported as soon as  they are identified.     • POC Glucose, Blood 07/10/2022 139 (A)   • Influenza virus A RNA 07/10/2022 Negative    • Influenza virus B, PCR 07/10/2022 Negative    • RSV, PCR 07/10/2022 Negative    • SARS-CoV-2 by PCR 07/10/2022 NotDetected    • SARS-CoV-2 Source 07/10/2022 NP Swab    • Troponin T 07/10/2022 22 (A)   • Report 07/10/2022                      Value:[facility]    Test Date:  2022-07-10  Pt Name:    ORLIN STAPLES                  Department: ER  MRN:        [id number]                      Room:       [facility]  Gender:     Male                         Technician: [id number]  :        1960                   Requested By:ANDREAS ACEVEDO  Order #:    [id number]                    Reading MD: Andreas Acevedo MD    Measurements  Intervals                                Axis  Rate:       115                          P:          12  NC:         144                          QRS:        72  QRSD:       72                           T:          48  QT:         316  QTc:        437    Interpretive Statements  SINUS TACHYCARDIA  LOW VOLTAGE IN FRONTAL LEADS  No previous ECG available for comparison  Electronically Signed On 7- 22:06:27 PDT by Andreas Acevedo MD     • GFR (CKD-EPI) 07/10/2022 103    • Diagnostic Alcohol 07/10/2022 <10.1    • NT-proBNP 07/10/2022 67    • Procalcitonin  07/10/2022 0.05    • WBC 07/11/2022 13.6 (A)   • RBC 07/11/2022 4.63 (A)   • Hemoglobin 07/11/2022 16.1    • Hematocrit 07/11/2022 46.2    • MCV 07/11/2022 99.8 (A)   • MCH 07/11/2022 34.8 (A)   • MCHC 07/11/2022 34.8    • RDW 07/11/2022 47.5    • Platelet Count 07/11/2022 234    • MPV 07/11/2022 9.0    • Neutrophils-Polys 07/11/2022 81.60 (A)   • Lymphocytes 07/11/2022 7.10 (A)   • Monocytes 07/11/2022 10.40    • Eosinophils 07/11/2022 0.10    • Basophils 07/11/2022 0.40    • Immature Granulocytes 07/11/2022 0.40    • Nucleated RBC 07/11/2022 0.00    • Neutrophils (Absolute) 07/11/2022 11.11 (A)   • Lymphs (Absolute) 07/11/2022 0.97 (A)   • Monos (Absolute) 07/11/2022 1.41 (A)   • Eos (Absolute) 07/11/2022 0.01    • Baso (Absolute) 07/11/2022 0.06    • Immature Granulocytes (a* 07/11/2022 0.06    • NRBC (Absolute) 07/11/2022 0.00    • Sodium 07/11/2022 134 (A)   • Potassium 07/11/2022 4.0    • Chloride 07/11/2022 100    • Co2 07/11/2022 22    • Anion Gap 07/11/2022 12.0    • Glucose 07/11/2022 85    • Bun 07/11/2022 7 (A)   • Creatinine 07/11/2022 0.54    • Calcium 07/11/2022 8.2 (A)   • AST(SGOT) 07/11/2022 22    • ALT(SGPT) 07/11/2022 14    • Alkaline Phosphatase 07/11/2022 98    • Total Bilirubin 07/11/2022 0.6    • Albumin 07/11/2022 3.6    • Total Protein 07/11/2022 6.3    • Globulin 07/11/2022 2.7    • A-G Ratio 07/11/2022 1.3    • Magnesium 07/11/2022 1.9    • Procalcitonin 07/11/2022 0.63 (A)   • TSH 07/10/2022 <0.005 (A)   • Free T-4 07/10/2022 1.35    • Lactic Acid 07/11/2022 1.1    • GFR (CKD-EPI) 07/11/2022 114    • Phosphorus 07/11/2022 3.7    • Troponin T 07/11/2022 37 (A)   • Microsomal -Tpo- Abs 07/10/2022 20.0 (A)   • Report 07/11/2022                      Value:[facility]    Test Date:  2022-07-11  Pt Name:    ORLIN STAPLES                  Department: ER  MRN:        [id number]                      Room:       [facility]  Gender:     Male                          Technician: [id number]  :        1960                   Requested By:JAY WILLETT  Order #:    [id number]                    Reading MD:    Measurements  Intervals                                Axis  Rate:       94                           P:          -45  NY:         156                          QRS:        99  QRSD:       72                           T:          -3  QT:         356  QTc:        446    Interpretive Statements  SINUS OR ECTOPIC ATRIAL RHYTHM  BORDERLINE RIGHT AXIS DEVIATION  BORDERLINE T WAVE ABNORMALITIES  Compared to ECG 07/10/2022 20:55:51  Ectopic atrial rhythm now present  T-wave abnormality now present  Sinus tachycardia no longer present     • Eject.Frac. MOD BP 2022 61.51    • Eject.Frac. MOD 4C 2022 61.7    • Eject.Frac. MOD 2C 2022 61.34    • Left Ventrical Ejection * 2022 65    • Prolactin 2022 3.79    • Troponin T 2022 27 (A)         Imaging:   EC-ECHOCARDIOGRAM COMPLETE W/O CONT   Final Result      CT-CTA CHEST PULMONARY ARTERY W/ RECONS   Final Result      1.  No pulmonary embolus. No acute abnormality in the chest.   2.  Emphysema.         CT-HEAD W/O   Final Result         1.  No acute intracranial findings. No evidence of acute hemorrhage or mass lesion.      2. Cerebral atrophy.      3.  White matter lucencies most consistent with chronic small vessel ischemic change.      4. Encephalomalacia in the left parietal and occipital region with ex vacuo enlargement of the posterior horn of the left lateral ventricle, similar to prior.               DX-CHEST-PORTABLE (1 VIEW)   Final Result         Mild interstitial prominence could relate to fluid overload or atypical infection.      MR-BRAIN-WITH & W/O    (Results Pending)         Problem Representation:   The patient is a 62 y/o M with PMH hyperthyroidism off medication, who presents with unwitnessed fall & LOC concerning for epilepsy vs syncope. Incidentally found to have elevated white count  w/o bandemia, fevers that may be related to stress response. However elevated Pro-josy raises need to treat empirically for suspected infection, most likely atypical PNA based on presentation.    * Syncope and collapse- (present on admission)  Assessment & Plan  Patient reportedly found down at the [facility] on the fredo potty.  Patient does not remember episode or event.  Last memory going to the bathroom for bowel movement. CT of chest w/o PE, T4 w/n/l  - Appears to be more syncope versus seizure (no reported urinary incontinence, tongue biting, postictal state, however structural changes on CT of brain puts patient at risk)  - Differential including cardiogenic or vasovagal, less likely neurogenic or orthostatic  - Telemetry  - on EEG for monitoring  - MRI w&w/o ordered  - Utox ordered      Elevated troponin- (present on admission)  Assessment & Plan  Slightly elevated troponin with chest pain consistently for the past 3 days, unlikely to be focal cardiac origin. No dynamic EKG changes.    Pleuritic chest pain- (present on admission)  Assessment & Plan  Pleuritic chest pain present with patient for the past 3 days.  No history of blood clots, however wells score intermediate at 4.5. CTA chest w/o significant findings. May represent costochondral injury 2/2 ground level fall.  - Troponin slightly elevated, peaked at around 40.  - No signs of ischemia on EKG    Acute hypoxemic respiratory failure (HCC)- (present on admission)  Assessment & Plan  Patient was started on 2L oxygen in ED, now w/o oxygen requirement    History of hyperthyroidism- (present on admission)  Assessment & Plan  Self-reported history of hyperthyroidism previously on methimazole, however no history on file. T4 w/n/l  - Records requested from outpt clinic in [place]    Atypical pneumonia- (present on admission)  Assessment & Plan  Per chest x-ray, see sepsis    Encephalomalacia- (present on admission)  Assessment & Plan  Encephalomalacia ex  vacuo of left parietal and occipital lobes, unknown timeframe, patient denies prior seizures and stroke. Stable from previous CT scan, but architecture abnormalities raise suspicion for epilepsy as cause of LOC.  - Requested records from outpt clinic in [place].    Lactic acidosis- resolved- (present on admission)  Assessment & Plan  Lactic acid 3.2 on admission, fluid resuscitation, antibiotics, trend    Sepsis (HCC)- (present on admission)  Assessment & Plan  Sepsis Present on admission  SIRS criteria identified on my evaluation include: Fever, with temperature greater than 101 deg F, Tachycardia, with heart rate greater than 90 BPM and Tachypnea, with respirations greater than 20 per minute  Source was likely lung: chest x-ray revealing increased interstitial prominence suggesting atypical pneumonia  - Blood cultures x2, patient denies sputum production  - Ceftriaxone and azithromycin for empiric coverage

## 2022-07-12 NOTE — DISCHARGE PLANNING
Assessment completed at bedside. States he lives at [facility]. Copied Medicaid Card, emailed PFA and uploaded into media. Patient reports no issues with ADLs and anticipates discharging back to shelter.     Care Transition Team Assessment    Information Source  Orientation Level: Oriented X4  Information Given By: Patient  Who is responsible for making decisions for patient? : Patient    Readmission Evaluation  Is this a readmission?: No    Elopement Risk  Legal Hold: No  Ambulatory or Self Mobile in Wheelchair: No-Not an Elopement Risk  Elopement Risk: Not at Risk for Elopement    Interdisciplinary Discharge Planning  Does Admitting Nurse Feel This Could be a Complex Discharge?: No  Lives with - Patient's Self Care Capacity: Alone and Able to Care For Self  Patient or legal guardian wants to designate a caregiver: No  Support Systems: Shelter  Housing / Facility: Homeless  Name of Care Facility: [facility]  Prior Services: None  Assistance Needed: No  Durable Medical Equipment: Walker    Discharge Preparedness  Prior Functional Level: Ambulatory    Functional Assesment  Prior Functional Level: Ambulatory    Finances  Financial Barriers to Discharge: No  Prescription Coverage: Yes    Vision / Hearing Impairment  Vision Impairment : No  Hearing Impairment : No         Advance Directive  Advance Directive?: None    Domestic Abuse  Have you ever been the victim of abuse or violence?: No  Physical Abuse or Sexual Abuse: No  Verbal Abuse or Emotional Abuse: No  Possible Abuse/Neglect Reported to:: Not Applicable              Anticipated Discharge Information  Discharge Disposition: Discharged to home/self care (01)

## 2022-07-12 NOTE — CARE PLAN
The patient is Watcher - Medium risk of patient condition declining or worsening    Shift Goals  Clinical Goals: Safety, tele monitor  Patient Goals: Rest, call for assistance    Progress made toward(s) clinical / shift goals:    Problem: Pain - Standard  Goal: Alleviation of pain or a reduction in pain to the patient’s comfort goal  Outcome: Progressing     Problem: Knowledge Deficit - Standard  Goal: Patient and family/care givers will demonstrate understanding of plan of care, disease process/condition, diagnostic tests and medications  Outcome: Progressing     Problem: Fall Risk  Goal: Patient will remain free from falls  Outcome: Progressing

## 2022-07-13 ENCOUNTER — APPOINTMENT (OUTPATIENT)
Dept: RADIOLOGY | Facility: MEDICAL CENTER | Age: 62
DRG: 871 | End: 2022-07-13
Attending: STUDENT IN AN ORGANIZED HEALTH CARE EDUCATION/TRAINING PROGRAM
Payer: MEDICAID

## 2022-07-13 LAB
APTT PPP: 29 SEC (ref 24.7–36)
BACTERIA UR CULT: NORMAL
BURR CELLS/RBC NFR CSF MANUAL: 0 %
CFT BLD TEG: 7.8 MIN (ref 4.6–9.1)
CFT P HPASE BLD TEG: 7.4 MIN (ref 4.3–8.3)
CLARITY CSF: CLEAR
CLOT ANGLE BLD TEG: 66.5 DEGREES (ref 63–78)
CLOT LYSIS 30M P MA LENFR BLD TEG: 0.2 % (ref 0–2.6)
COLOR CSF: COLORLESS
COLOR SPUN CSF: COLORLESS
CT.EXTRINSIC BLD ROTEM: 2 MIN (ref 0.8–2.1)
GLUCOSE CSF-MCNC: 62 MG/DL (ref 40–80)
INR PPP: 0.93 (ref 0.87–1.13)
LYMPHOCYTES NFR CSF: 32 %
MCF BLD TEG: 53 MM (ref 52–69)
MCF.PLATELET INHIB BLD ROTEM: 17 MM (ref 15–32)
MONONUC CELLS NFR CSF: 67 %
NEUTROPHILS NFR CSF: 1 %
PA AA BLD-ACNC: 22.7 % (ref 0–11)
PA ADP BLD-ACNC: 13.9 % (ref 0–17)
PROCALCITONIN SERPL-MCNC: 0.43 NG/ML
PROT CSF-MCNC: 37 MG/DL (ref 15–45)
PROTHROMBIN TIME: 12.4 SEC (ref 12–14.6)
RBC # CSF: 3 CELLS/UL
SIGNIFICANT IND 70042: NORMAL
SITE SITE: NORMAL
SOURCE SOURCE: NORMAL
SPECIMEN VOL CSF: 24.9 ML
TEG ALGORITHM TGALG: ABNORMAL
THYROGLOB AB SERPL-ACNC: <0.9 IU/ML (ref 0–4)
THYROGLOB SERPL-MCNC: 63.8 NG/ML (ref 1.3–31.8)
THYROGLOB SERPL-MCNC: ABNORMAL NG/ML (ref 1.3–31.8)
THYROPEROXIDASE AB SERPL-ACNC: 10.7 IU/ML (ref 0–9)
TUBE # CSF: 3
TUBE # CSF: 4
VIT B12 SERPL-MCNC: 261 PG/ML (ref 211–911)
WBC # CSF: 1 CELLS/UL (ref 0–10)

## 2022-07-13 PROCEDURE — 700102 HCHG RX REV CODE 250 W/ 637 OVERRIDE(OP)

## 2022-07-13 PROCEDURE — A9270 NON-COVERED ITEM OR SERVICE: HCPCS

## 2022-07-13 PROCEDURE — 85576 BLOOD PLATELET AGGREGATION: CPT | Mod: 91

## 2022-07-13 PROCEDURE — 82607 VITAMIN B-12: CPT

## 2022-07-13 PROCEDURE — 770001 HCHG ROOM/CARE - MED/SURG/GYN PRIV*

## 2022-07-13 PROCEDURE — 89051 BODY FLUID CELL COUNT: CPT

## 2022-07-13 PROCEDURE — 82945 GLUCOSE OTHER FLUID: CPT

## 2022-07-13 PROCEDURE — 36415 COLL VENOUS BLD VENIPUNCTURE: CPT

## 2022-07-13 PROCEDURE — 009U3ZX DRAINAGE OF SPINAL CANAL, PERCUTANEOUS APPROACH, DIAGNOSTIC: ICD-10-PCS | Performed by: RADIOLOGY

## 2022-07-13 PROCEDURE — 62270 DX LMBR SPI PNXR: CPT

## 2022-07-13 PROCEDURE — 84145 PROCALCITONIN (PCT): CPT

## 2022-07-13 PROCEDURE — 85610 PROTHROMBIN TIME: CPT

## 2022-07-13 PROCEDURE — 85730 THROMBOPLASTIN TIME PARTIAL: CPT

## 2022-07-13 PROCEDURE — 85347 COAGULATION TIME ACTIVATED: CPT

## 2022-07-13 PROCEDURE — 85384 FIBRINOGEN ACTIVITY: CPT

## 2022-07-13 PROCEDURE — 84157 ASSAY OF PROTEIN OTHER: CPT

## 2022-07-13 PROCEDURE — 99233 SBSQ HOSP IP/OBS HIGH 50: CPT | Mod: GC | Performed by: INTERNAL MEDICINE

## 2022-07-13 PROCEDURE — 700111 HCHG RX REV CODE 636 W/ 250 OVERRIDE (IP)

## 2022-07-13 RX ORDER — AMOXICILLIN AND CLAVULANATE POTASSIUM 875; 125 MG/1; MG/1
1 TABLET, FILM COATED ORAL EVERY 12 HOURS
Status: COMPLETED | OUTPATIENT
Start: 2022-07-13 | End: 2022-07-15

## 2022-07-13 RX ORDER — AMOXICILLIN AND CLAVULANATE POTASSIUM 875; 125 MG/1; MG/1
TABLET, FILM COATED ORAL
Status: COMPLETED
Start: 2022-07-13 | End: 2022-07-13

## 2022-07-13 RX ADMIN — ACETAMINOPHEN 650 MG: 325 TABLET, FILM COATED ORAL at 15:17

## 2022-07-13 RX ADMIN — AMOXICILLIN AND CLAVULANATE POTASSIUM 1 TABLET: 875; 125 TABLET, FILM COATED ORAL at 16:49

## 2022-07-13 RX ADMIN — CEFTRIAXONE SODIUM 2 G: 10 INJECTION, POWDER, FOR SOLUTION INTRAVENOUS at 06:05

## 2022-07-13 RX ADMIN — ENOXAPARIN SODIUM 40 MG: 40 INJECTION SUBCUTANEOUS at 16:49

## 2022-07-13 ASSESSMENT — ENCOUNTER SYMPTOMS
SORE THROAT: 0
WHEEZING: 0
CHILLS: 1
WEIGHT LOSS: 0
CONSTIPATION: 0
HEMOPTYSIS: 0
COUGH: 1
BLOOD IN STOOL: 0
FEVER: 1
MUSCULOSKELETAL NEGATIVE: 1
PALPITATIONS: 0
SHORTNESS OF BREATH: 0
FLANK PAIN: 0
VOMITING: 1
LOSS OF CONSCIOUSNESS: 1
CARDIOVASCULAR NEGATIVE: 1
DIARRHEA: 1
EYES NEGATIVE: 1
ABDOMINAL PAIN: 0
NAUSEA: 1
PSYCHIATRIC NEGATIVE: 1
DIAPHORESIS: 0
SPUTUM PRODUCTION: 0

## 2022-07-13 ASSESSMENT — FIBROSIS 4 INDEX: FIB4 SCORE: 1.85

## 2022-07-13 NOTE — DIETARY
"Nutrition services: Day 3 of admit.  James Samaniego is a 61 y.o. male with admitting DX of Sepsis  Consult received for MST of 2      Assessment:  Height: 180.3 cm (5' 11\")  Weight: 63.5 kg (139 lb 15.9 oz) on 7/11 via stand up scale.  Body mass index is 19.52 kg/m²., BMI classification: Normal weight  Diet/Intake: NPO    Evaluation:   1. MST of 2 reports Pt has lost weight but unsure of how much over > 1 year with no changes in appetite.  2. Spoke with Pt at bedside. Pt denies any weight loss. Pt states his UBW is around 155-160 lbs (10% - 13% weight loss in unknown time span). Only other weight in chart review is listed at 160 lbs but is a stated weight.  3. Pt states his appetite has not changed at all. Pt PO during admit has been % x 2.  4. Nutrition focused physical exam indicates Pt has had some fat loss and muscle loss. Pt's upper arm region had some fat tissue, not ample indicating moderate fat loss. Pt's clavicle bone region had some protrusion with bone visible indicating moderate muscle loss. Pt's temple region was slightly depressed  5. Labs: Na 133 (L).  6. Meds: BM protocol. Augmentin  7. Last BM: 7/12    Malnutrition Risk: Pt meets criteria for moderate malnutrition in the context of chronic illness likely R/T hypermetabolism likely secondary to self-reported history of hyperthyroidism AEB moderate fat loss and moderate muscle loss per nutrition focused physical exam.    Recommendations/Plan:  1. Advance diet once medically feasible per MD  2. Monitor weight    RD following.          "

## 2022-07-13 NOTE — CARE PLAN
The patient is Watcher - Medium risk of patient condition declining or worsening    Problem: Pain - Standard  Goal: Alleviation of pain or a reduction in pain to the patient’s comfort goal  7/13/2022 1724 by Henok Vazquez R.N.  Outcome: Progressing  7/13/2022 1520 by Henok Vazquez R.N.  Outcome: Progressing     Problem: Knowledge Deficit - Standard  Goal: Patient and family/care givers will demonstrate understanding of plan of care, disease process/condition, diagnostic tests and medications  7/13/2022 1724 by Henok Vazquez R.N.  Outcome: Progressing  7/13/2022 1520 by Henok Vazquez R.N.  Outcome: Progressing     Problem: Fall Risk  Goal: Patient will remain free from falls  7/13/2022 1724 by Henok Vazquez R.N.  Outcome: Progressing  7/13/2022 1520 by Henok Vazquez R.N.  Outcome: Progressing    Shift Goals  Clinical Goals: Safety, monitor mentation      Progress made toward(s) clinical / shift goals:  Patient update on POC, all questions answered. Patient has remained free from falls. All fall precautions in place: non-slip socks, bed locked and in lowest position, bed alarm on, call light within reach.

## 2022-07-13 NOTE — PROGRESS NOTES
Monitor Summary:     Rhythm:  Sinus Rhythm/Ozzie    Rate: 94-58    Measurement: .15/.08/.38    Ectopy: R PVCs    12 hr cc

## 2022-07-13 NOTE — PROGRESS NOTES
"Daily Progress Note:     Date of Service: 7/12/2022  Primary Team: [facility] Team   Attending: Say Massey M.D.   Senior Resident: Dr. Leon  Intern: Dr. Muñoz  Contact:  [phone]    Chief Complaint:   Found Down    Subjective  The patient is a 62 y/o M with PMH hyperthyroidism off medication, who presented with >1 month history of regular fainting spells. Since admission, the patient states he feels stable malaise. No new syncopal or seizure events overnight. Patient had friend visit, who witnessed one of these events. Patient's friend said fainting is typical near the restroom and that the patient has had \"close-calls\" where he is diaphoretic and collapses without LOC.    Consultants/Specialty:  Neurology    Review of Systems:    Review of Systems   Constitutional: Positive for chills, fever and malaise/fatigue. Negative for diaphoresis and weight loss.   HENT: Negative.  Negative for hearing loss and sore throat.    Eyes: Negative.    Respiratory: Positive for cough. Negative for hemoptysis, sputum production, shortness of breath and wheezing.    Cardiovascular: Negative.  Negative for chest pain and palpitations.   Gastrointestinal: Positive for diarrhea, nausea and vomiting. Negative for abdominal pain, blood in stool, constipation and melena.   Genitourinary: Negative.  Negative for dysuria, flank pain, hematuria and urgency.   Musculoskeletal: Negative.    Skin: Negative.    Neurological: Positive for loss of consciousness.   Endo/Heme/Allergies: Negative.    Psychiatric/Behavioral: Negative.        Objective Data:   Physical Exam:   Vitals:   Temp:  [36.3 °C (97.3 °F)-38.1 °C (100.6 °F)] 37.7 °C (99.9 °F)  Pulse:  [74-99] 74  Resp:  [16-18] 18  BP: (124-156)/(75-92) 124/87  SpO2:  [90 %-93 %] 91 %    Physical Exam  Constitutional:       General: He is not in acute distress.     Appearance: He is normal weight.   HENT:      Head: Normocephalic and atraumatic.      Mouth/Throat:      Mouth: Mucous " membranes are moist.      Pharynx: Oropharynx is clear.   Eyes:      Extraocular Movements: Extraocular movements intact.      Pupils: Pupils are equal, round, and reactive to light.   Cardiovascular:      Rate and Rhythm: Normal rate and regular rhythm.      Pulses: Normal pulses.      Heart sounds: Normal heart sounds, S1 normal and S2 normal.   Pulmonary:      Effort: Pulmonary effort is normal.      Breath sounds: Wheezing present.   Abdominal:      General: Abdomen is flat. Bowel sounds are normal.      Palpations: Abdomen is soft.      Tenderness: There is abdominal tenderness (in RUQ; palpable, smooth liver edge 2cm below costal margin).   Genitourinary:     Penis: Normal.       Testes: Normal.   Musculoskeletal:         General: Normal range of motion.      Cervical back: Normal range of motion.   Skin:     General: Skin is warm and dry.      Capillary Refill: Capillary refill takes less than 2 seconds.   Neurological:      General: No focal deficit present.      Mental Status: He is alert and oriented to person, place, and time.   Psychiatric:         Mood and Affect: Mood normal.         Thought Content: Thought content normal. Thought content does not include homicidal or suicidal ideation.       Labs:   Admission on 07/10/2022   Component Date Value   • Lactic Acid 07/10/2022 3.2 (A)   • Lactic Acid 07/10/2022 1.1    • WBC 07/10/2022 11.7 (A)   • RBC 07/10/2022 5.01    • Hemoglobin 07/10/2022 17.3    • Hematocrit 07/10/2022 49.0    • MCV 07/10/2022 97.8    • MCH 07/10/2022 34.5 (A)   • MCHC 07/10/2022 35.3    • RDW 07/10/2022 46.2    • Platelet Count 07/10/2022 258    • MPV 07/10/2022 8.6 (A)   • Neutrophils-Polys 07/10/2022 79.70 (A)   • Lymphocytes 07/10/2022 6.80 (A)   • Monocytes 07/10/2022 10.80    • Eosinophils 07/10/2022 1.70    • Basophils 07/10/2022 0.70    • Immature Granulocytes 07/10/2022 0.30    • Nucleated RBC 07/10/2022 0.00    • Neutrophils (Absolute) 07/10/2022 9.31 (A)   • Lymphs  (Absolute) 07/10/2022 0.80 (A)   • Monos (Absolute) 07/10/2022 1.26 (A)   • Eos (Absolute) 07/10/2022 0.20    • Baso (Absolute) 07/10/2022 0.08    • Immature Granulocytes (a* 07/10/2022 0.04    • NRBC (Absolute) 07/10/2022 0.00    • Sodium 07/10/2022 138    • Potassium 07/10/2022 4.4    • Chloride 07/10/2022 105    • Co2 07/10/2022 21    • Anion Gap 07/10/2022 12.0    • Glucose 07/10/2022 137 (A)   • Bun 07/10/2022 8    • Creatinine 07/10/2022 0.76    • Calcium 07/10/2022 9.1    • AST(SGOT) 07/10/2022 15    • ALT(SGPT) 07/10/2022 14    • Alkaline Phosphatase 07/10/2022 115 (A)   • Total Bilirubin 07/10/2022 0.4    • Albumin 07/10/2022 4.2    • Total Protein 07/10/2022 7.3    • Globulin 07/10/2022 3.1    • A-G Ratio 07/10/2022 1.4    • Color 07/10/2022 Yellow    • Character 07/10/2022 Clear    • Specific Gravity 07/10/2022 1.012    • Ph 07/10/2022 6.5    • Glucose 07/10/2022 Negative    • Ketones 07/10/2022 Negative    • Protein 07/10/2022 Negative    • Bilirubin 07/10/2022 Negative    • Urobilinogen, Urine 07/10/2022 1.0    • Nitrite 07/10/2022 Negative    • Leukocyte Esterase 07/10/2022 Negative    • Occult Blood 07/10/2022 Negative    • Micro Urine Req 07/10/2022 see below    • Significant Indicator 07/10/2022 NEG    • Source 07/10/2022 BLD    • Site 07/10/2022 PERIPHERAL    • Culture Result 07/10/2022                      Value:No Growth  Note: Blood cultures are incubated for 5 days and  are monitored continuously.Positive blood cultures  are called to the RN and reported as soon as  they are identified.     • Significant Indicator 07/10/2022 NEG    • Source 07/10/2022 BLD    • Site 07/10/2022 PERIPHERAL    • Culture Result 07/10/2022                      Value:No Growth  Note: Blood cultures are incubated for 5 days and  are monitored continuously.Positive blood cultures  are called to the RN and reported as soon as  they are identified.     • POC Glucose, Blood 07/10/2022 139 (A)   • Influenza virus A RNA  07/10/2022 Negative    • Influenza virus B, PCR 07/10/2022 Negative    • RSV, PCR 07/10/2022 Negative    • SARS-CoV-2 by PCR 07/10/2022 NotDetected    • SARS-CoV-2 Source 07/10/2022 NP Swab    • Troponin T 07/10/2022 22 (A)   • Report 07/10/2022                      Value:[facility] Emergency [facility]    Test Date:  2022-07-10  Pt Name:    ORLIN STAPLES                  Department: ER  MRN:        [id number]                      Room:       [facility]  Gender:     Male                         Technician: [id number]  :        1960                   Requested By:ANDREAS ACEVEDO  Order #:    [id number]                    Reading MD: Andreas Acevedo MD    Measurements  Intervals                                Axis  Rate:       115                          P:          12  AL:         144                          QRS:        72  QRSD:       72                           T:          48  QT:         316  QTc:        437    Interpretive Statements  SINUS TACHYCARDIA  LOW VOLTAGE IN FRONTAL LEADS  No previous ECG available for comparison  Electronically Signed On 7- 22:06:27 PDT by Andreas Acevedo MD     • GFR (CKD-EPI) 07/10/2022 103    • Diagnostic Alcohol 07/10/2022 <10.1    • NT-proBNP 07/10/2022 67    • Procalcitonin 07/10/2022 0.05    • WBC 2022 13.6 (A)   • RBC 2022 4.63 (A)   • Hemoglobin 2022 16.1    • Hematocrit 2022 46.2    • MCV 2022 99.8 (A)   • MCH 2022 34.8 (A)   • MCHC 2022 34.8    • RDW 2022 47.5    • Platelet Count 2022 234    • MPV 2022 9.0    • Neutrophils-Polys 2022 81.60 (A)   • Lymphocytes 2022 7.10 (A)   • Monocytes 2022 10.40    • Eosinophils 2022 0.10    • Basophils 2022 0.40    • Immature Granulocytes 2022 0.40    • Nucleated RBC 2022 0.00    • Neutrophils (Absolute) 2022 11.11 (A)   • Lymphs (Absolute) 2022 0.97 (A)   • Monos (Absolute) 2022 1.41 (A)   • Eos (Absolute)  2022 0.01    • Baso (Absolute) 2022 0.06    • Immature Granulocytes (a* 2022 0.06    • NRBC (Absolute) 2022 0.00    • Sodium 2022 134 (A)   • Potassium 2022 4.0    • Chloride 2022 100    • Co2 2022 22    • Anion Gap 2022 12.0    • Glucose 2022 85    • Bun 2022 7 (A)   • Creatinine 2022 0.54    • Calcium 2022 8.2 (A)   • AST(SGOT) 2022 22    • ALT(SGPT) 2022 14    • Alkaline Phosphatase 2022 98    • Total Bilirubin 2022 0.6    • Albumin 2022 3.6    • Total Protein 2022 6.3    • Globulin 2022 2.7    • A-G Ratio 2022 1.3    • Magnesium 2022 1.9    • Procalcitonin 2022 0.63 (A)   • TSH 07/10/2022 <0.005 (A)   • Free T-4 07/10/2022 1.35    • Lactic Acid 2022 1.1    • GFR (CKD-EPI) 2022 114    • Phosphorus 2022 3.7    • Troponin T 2022 37 (A)   • Microsomal -Tpo- Abs 07/10/2022 20.0 (A)   • Report 2022                      Value:[facility] Emergency Dept.    Test Date:  2022  Pt Name:    ORLIN STAPLES                  Department: ER  MRN:        [id number]                      Room:       [facility]  Gender:     Male                         Technician: [id number]  :        1960                   Requested By:JAY WILLETT  Order #:    [id number]                    Reading MD:    Measurements  Intervals                                Axis  Rate:       94                           P:          -45  ID:         [id number]                          QRS:        99  QRSD:       72                           T:          -3  QT:         356  QTc:        446    Interpretive Statements  SINUS OR ECTOPIC ATRIAL RHYTHM  BORDERLINE RIGHT AXIS DEVIATION  BORDERLINE T WAVE ABNORMALITIES  Compared to ECG 07/10/2022 20:55:51  Ectopic atrial rhythm now present  T-wave abnormality now present  Sinus tachycardia no longer present     • Donnell.Trish. MOD BP 2022  61.51    • Eject.Frac. MOD 4C 07/11/2022 61.7    • Eject.Frac. MOD 2C 07/11/2022 61.34    • Left Ventrical Ejection * 07/11/2022 65    • Prolactin 07/11/2022 3.79    • Troponin T 07/11/2022 27 (A)         Imaging:   MR-BRAIN-WITH & W/O   Final Result      1.  There is moderate to severe dilatation of the lateral and third ventricles. There is prominent sylvian cisterns compared with the frontoparietal sulci. There is also acute angulation of bilateral frontal horns. These findings likely represent normal    pressure hydrocephalus. The size of the ventricles are slightly increased since the previous CT scan dated 5/2/2022.   2.  Chronic infarcts in the left parietal and right frontal lobes.   3.  Moderate chronic microvascular ischemic disease.   4.  Mild cerebral volume loss.      EC-ECHOCARDIOGRAM COMPLETE W/O CONT   Final Result      CT-CTA CHEST PULMONARY ARTERY W/ RECONS   Final Result      1.  No pulmonary embolus. No acute abnormality in the chest.   2.  Emphysema.         CT-HEAD W/O   Final Result         1.  No acute intracranial findings. No evidence of acute hemorrhage or mass lesion.      2. Cerebral atrophy.      3.  White matter lucencies most consistent with chronic small vessel ischemic change.      4. Encephalomalacia in the left parietal and occipital region with ex vacuo enlargement of the posterior horn of the left lateral ventricle, similar to prior.               DX-CHEST-PORTABLE (1 VIEW)   Final Result         Mild interstitial prominence could relate to fluid overload or atypical infection.            Problem Representation:   The patient is a 60 y/o M with PMH hyperthyroidism off medication, who presents with unwitnessed fall & LOC concerning for epilepsy vs syncope. Incidentally found to have normal pressure hydrocephalus during workup for possible epilepsy.    * Syncope and collapse- (present on admission)  Assessment & Plan  Patient reportedly found down at the [facility] on the [facility]  potty.  Patient does not remember episode or event.  Last memory going to the bathroom for bowel movement. CT of chest w/o PE, T4 w/n/l. EEG w/n/l so far. Collateral from patient's friend favors vasovagal syndrome.  - Appears to be more syncope versus seizure (no reported urinary incontinence during episode, tongue biting, postictal state, however structural changes on CT of brain puts patient at risk)  - Differential including cardiogenic or vasovagal, less likely neurogenic or orthostatic  - Telemetry        Ataxic gait  Assessment & Plan  Possibly 2/2 NPH vs B12 deficiency  -B12 level ordered  -Plan to treat NPH w/ IR guided therapeutic LP    Normal pressure hydrocephalus (HCC)  Assessment & Plan  Found incidentally on MRI. Patient does have ataxic gait, difficulties recalling major health events, and when prompted, will agree he has had some progressive urinary incontinence. Possibly 2/2 repeat head traumas from falls, causing poor reabsorption of CSF.  - Therapeutic IR-guided LP; measure opening pressure to confirm MRI findings    Elevated troponin- (present on admission)  Assessment & Plan  Slightly elevated troponin with chest pain consistently for the past 3 days, unlikely to be focal cardiac origin. No dynamic EKG changes.    Pleuritic chest pain- (present on admission)  Assessment & Plan  Pleuritic chest pain present with patient for the past 3 days.  No history of blood clots, however wells score intermediate at 4.5. CTA chest w/o significant findings. May represent costochondral injury 2/2 ground level fall.  - Troponin slightly elevated, peaked at around 40.  - No signs of ischemia on EKG    Acute hypoxemic respiratory failure (HCC)- (present on admission)  Assessment & Plan  Patient was started on 2L oxygen in ED, now w/o oxygen requirement    History of hyperthyroidism- (present on admission)  Assessment & Plan  Self-reported history of hyperthyroidism previously on methimazole, however no history on  file. T4 w/n/l  - Records requested from outpt clinic in [place]    Atypical pneumonia- (present on admission)  Assessment & Plan  Per chest x-ray, see sepsis    Encephalomalacia- (present on admission)  Assessment & Plan  Encephalomalacia ex vacuo of left parietal and occipital lobes, unknown timeframe, patient denies prior seizures and stroke. Stable from previous CT scan, but architecture abnormalities raise suspicion for epilepsy as cause of LOC. May also be related to NPH.  - Requested records from outpt clinic in [place].    Lactic acidosis- resolved- (present on admission)  Assessment & Plan  Lactic acid 3.2 on admission, fluid resuscitation, antibiotics, resolved    Sepsis (HCC)- (present on admission)  Assessment & Plan  Sepsis Present on admission  SIRS criteria identified on my evaluation include: Fever, with temperature greater than 101 deg F, Tachycardia, with heart rate greater than 90 BPM and Tachypnea, with respirations greater than 20 per minute  Source was likely lung: chest x-ray revealing increased interstitial prominence suggesting atypical pneumonia  - Blood cultures x2 negative, patient denies sputum production  - Continue Ceftriaxone and azithromycin for empiric coverage

## 2022-07-13 NOTE — PROGRESS NOTES
.Bedside report received from night shift RN. Assumed care at 0700. Pt is A&Ox4.Pt states 8/10 stabbing pain of R. Lung that worsens upon inspiration. Pt provided incentive spirometer. Pt demonstrated proper use of IS @ 1750 mL. Pt was updated on plan of care. Pt has call light, personal belongings, and bedside table within reach. Bed is in the lowest position and bed alarm is on.

## 2022-07-13 NOTE — PROGRESS NOTES
Bedside report received from day RN, pt care assumed, assessment completed. Pt is A&O 3, pain 0/10, SR on the monitor. Updated on POC, questions answered. Bed in lowest, locked position, treaded socks on, call light and belongings within reach. Fall precautions in place.

## 2022-07-13 NOTE — CARE PLAN
The patient is Watcher - Medium risk of patient condition declining or worsening    Shift Goals  Clinical Goals: Safety, monitor mentation  Patient Goals: Rest, call for assistance    Progress made toward(s) clinical / shift goals:    Problem: Pain - Standard  Goal: Alleviation of pain or a reduction in pain to the patient’s comfort goal  Outcome: Progressing     Problem: Knowledge Deficit - Standard  Goal: Patient and family/care givers will demonstrate understanding of plan of care, disease process/condition, diagnostic tests and medications  Outcome: Progressing

## 2022-07-13 NOTE — ASSESSMENT & PLAN NOTE
Found incidentally on MRI. Patient does have ataxic gait, difficulties recalling major health events, and when prompted, will agree he has had some progressive urinary incontinence. Possibly 2/2 repeat head traumas from falls, causing poor reabsorption of CSF. Therapeutic IR-guided LP found slightly elevated opening pressure of 28 mm H2O. Will follow course post-LP.  - Will consult Neurosurgery for treatment; shunt placement vs starting diamox.

## 2022-07-13 NOTE — ASSESSMENT & PLAN NOTE
Possibly 2/2 NPH vs B12 deficiency. No acute change in gait after LP, but longstanding NPH may need time to show signs of improvement.  -B12 low, but within normal limits. Continuing repletion

## 2022-07-13 NOTE — PROGRESS NOTES
Daily Progress Note:     Date of Service: 7/13/2022  Primary Team: [facility] Team   Attending: Say Massey M.D.   Senior Resident: Dr. Leon  Intern: Dr. Muñoz  Contact:  [phone]    Chief Complaint:   Found Down    Subjective  The patient is a 60 y/o M with PMH hyperthyroidism off medication, who presented with >1 month history of regular fainting spells. Overnight, LP was not successful, will require IR for imaging guided LP. Since admission, the patient states he feels stable malaise. No new syncopal or seizure events overnight. No new chest pain, abdominal pain.    Consultants/Specialty:  Neurology    Review of Systems:    Review of Systems   Constitutional: Positive for chills, fever and malaise/fatigue. Negative for diaphoresis and weight loss.   HENT: Negative.  Negative for hearing loss and sore throat.    Eyes: Negative.    Respiratory: Positive for cough. Negative for hemoptysis, sputum production, shortness of breath and wheezing.    Cardiovascular: Negative.  Negative for chest pain and palpitations.   Gastrointestinal: Positive for diarrhea, nausea and vomiting. Negative for abdominal pain, blood in stool, constipation and melena.   Genitourinary: Negative.  Negative for dysuria, flank pain, hematuria and urgency.   Musculoskeletal: Negative.    Skin: Negative.    Neurological: Positive for loss of consciousness.   Endo/Heme/Allergies: Negative.    Psychiatric/Behavioral: Negative.        Objective Data:   Physical Exam:   Vitals:   Temp:  [36.3 °C (97.3 °F)-38.1 °C (100.6 °F)] 36.8 °C (98.2 °F)  Pulse:  [68-84] 74  Resp:  [18] 18  BP: (119-156)/(82-98) 120/82  SpO2:  [90 %-93 %] 93 %    Physical Exam  Constitutional:       General: He is not in acute distress.     Appearance: He is normal weight.   HENT:      Head: Normocephalic and atraumatic.      Mouth/Throat:      Mouth: Mucous membranes are moist.      Pharynx: Oropharynx is clear.   Eyes:      Extraocular Movements: Extraocular  movements intact.      Pupils: Pupils are equal, round, and reactive to light.   Cardiovascular:      Rate and Rhythm: Normal rate and regular rhythm.      Pulses: Normal pulses.      Heart sounds: Normal heart sounds, S1 normal and S2 normal.   Pulmonary:      Effort: Pulmonary effort is normal.      Breath sounds: Wheezing present.   Abdominal:      General: Abdomen is flat. Bowel sounds are normal.      Palpations: Abdomen is soft.      Tenderness: There is abdominal tenderness (in RUQ; palpable, smooth liver edge 2cm below costal margin).   Genitourinary:     Penis: Normal.       Testes: Normal.   Musculoskeletal:         General: Normal range of motion.      Cervical back: Normal range of motion.   Skin:     General: Skin is warm and dry.      Capillary Refill: Capillary refill takes less than 2 seconds.   Neurological:      General: No focal deficit present.      Mental Status: He is alert and oriented to person, place, and time.   Psychiatric:         Mood and Affect: Mood normal.         Thought Content: Thought content normal. Thought content does not include homicidal or suicidal ideation.       Labs:   Admission on 07/10/2022   Component Date Value   • Lactic Acid 07/10/2022 3.2 (A)   • Lactic Acid 07/10/2022 1.1    • WBC 07/10/2022 11.7 (A)   • RBC 07/10/2022 5.01    • Hemoglobin 07/10/2022 17.3    • Hematocrit 07/10/2022 49.0    • MCV 07/10/2022 97.8    • MCH 07/10/2022 34.5 (A)   • MCHC 07/10/2022 35.3    • RDW 07/10/2022 46.2    • Platelet Count 07/10/2022 258    • MPV 07/10/2022 8.6 (A)   • Neutrophils-Polys 07/10/2022 79.70 (A)   • Lymphocytes 07/10/2022 6.80 (A)   • Monocytes 07/10/2022 10.80    • Eosinophils 07/10/2022 1.70    • Basophils 07/10/2022 0.70    • Immature Granulocytes 07/10/2022 0.30    • Nucleated RBC 07/10/2022 0.00    • Neutrophils (Absolute) 07/10/2022 9.31 (A)   • Lymphs (Absolute) 07/10/2022 0.80 (A)   • Monos (Absolute) 07/10/2022 1.26 (A)   • Eos (Absolute) 07/10/2022 0.20     • Baso (Absolute) 07/10/2022 0.08    • Immature Granulocytes (a* 07/10/2022 0.04    • NRBC (Absolute) 07/10/2022 0.00    • Sodium 07/10/2022 138    • Potassium 07/10/2022 4.4    • Chloride 07/10/2022 105    • Co2 07/10/2022 21    • Anion Gap 07/10/2022 12.0    • Glucose 07/10/2022 137 (A)   • Bun 07/10/2022 8    • Creatinine 07/10/2022 0.76    • Calcium 07/10/2022 9.1    • AST(SGOT) 07/10/2022 15    • ALT(SGPT) 07/10/2022 14    • Alkaline Phosphatase 07/10/2022 115 (A)   • Total Bilirubin 07/10/2022 0.4    • Albumin 07/10/2022 4.2    • Total Protein 07/10/2022 7.3    • Globulin 07/10/2022 3.1    • A-G Ratio 07/10/2022 1.4    • Color 07/10/2022 Yellow    • Character 07/10/2022 Clear    • Specific Gravity 07/10/2022 1.012    • Ph 07/10/2022 6.5    • Glucose 07/10/2022 Negative    • Ketones 07/10/2022 Negative    • Protein 07/10/2022 Negative    • Bilirubin 07/10/2022 Negative    • Urobilinogen, Urine 07/10/2022 1.0    • Nitrite 07/10/2022 Negative    • Leukocyte Esterase 07/10/2022 Negative    • Occult Blood 07/10/2022 Negative    • Micro Urine Req 07/10/2022 see below    • Significant Indicator 07/10/2022 NEG    • Source 07/10/2022 UR    • Site 07/10/2022 -    • Culture Result 07/10/2022 Usual skin elida 10-50,000 cfu/mL    • Significant Indicator 07/10/2022 NEG    • Source 07/10/2022 BLD    • Site 07/10/2022 PERIPHERAL    • Culture Result 07/10/2022                      Value:No Growth  Note: Blood cultures are incubated for 5 days and  are monitored continuously.Positive blood cultures  are called to the RN and reported as soon as  they are identified.     • Significant Indicator 07/10/2022 NEG    • Source 07/10/2022 BLD    • Site 07/10/2022 PERIPHERAL    • Culture Result 07/10/2022                      Value:No Growth  Note: Blood cultures are incubated for 5 days and  are monitored continuously.Positive blood cultures  are called to the RN and reported as soon as  they are identified.     • POC Glucose, Blood  07/10/2022 139 (A)   • Influenza virus A RNA 07/10/2022 Negative    • Influenza virus B, PCR 07/10/2022 Negative    • RSV, PCR 07/10/2022 Negative    • SARS-CoV-2 by PCR 07/10/2022 NotDetected    • SARS-CoV-2 Source 07/10/2022 NP Swab    • Troponin T 07/10/2022 22 (A)   • Report 07/10/2022                      Value:[facility] Emergency [facility]    Test Date:  2022-07-10  Pt Name:    ORLIN STAPLES                  Department: ER  MRN:        [id number]                      Room:       [facility]  Gender:     Male                         Technician: [id number]  :        1960                   Requested By:ANDREAS ACEVEDO  Order #:    [id number]                    Reading MD: Andreas Acevedo MD    Measurements  Intervals                                Axis  Rate:       115                          P:          12  TN:         144                          QRS:        72  QRSD:       72                           T:          48  QT:         316  QTc:        437    Interpretive Statements  SINUS TACHYCARDIA  LOW VOLTAGE IN FRONTAL LEADS  No previous ECG available for comparison  Electronically Signed On 7- 22:06:27 PDT by Andreas Acevedo MD     • GFR (CKD-EPI) 07/10/2022 103    • Diagnostic Alcohol 07/10/2022 <10.1    • NT-proBNP 07/10/2022 67    • Procalcitonin 07/10/2022 0.05    • WBC 2022 13.6 (A)   • RBC 2022 4.63 (A)   • Hemoglobin 2022 16.1    • Hematocrit 2022 46.2    • MCV 2022 99.8 (A)   • MCH 2022 34.8 (A)   • MCHC 2022 34.8    • RDW 2022 47.5    • Platelet Count 2022 234    • MPV 2022 9.0    • Neutrophils-Polys 2022 81.60 (A)   • Lymphocytes 2022 7.10 (A)   • Monocytes 2022 10.40    • Eosinophils 2022 0.10    • Basophils 2022 0.40    • Immature Granulocytes 2022 0.40    • Nucleated RBC 2022 0.00    • Neutrophils (Absolute) 2022 11.11 (A)   • Lymphs (Absolute) 2022 0.97 (A)   • Monos  (Absolute) 2022 1.41 (A)   • Eos (Absolute) 2022 0.01    • Baso (Absolute) 2022 0.06    • Immature Granulocytes (a* 2022 0.06    • NRBC (Absolute) 2022 0.00    • Sodium 2022 134 (A)   • Potassium 2022 4.0    • Chloride 2022 100    • Co2 2022 22    • Anion Gap 2022 12.0    • Glucose 2022 85    • Bun 2022 7 (A)   • Creatinine 2022 0.54    • Calcium 2022 8.2 (A)   • AST(SGOT) 2022 22    • ALT(SGPT) 2022 14    • Alkaline Phosphatase 2022 98    • Total Bilirubin 2022 0.6    • Albumin 2022 3.6    • Total Protein 2022 6.3    • Globulin 2022 2.7    • A-G Ratio 2022 1.3    • Magnesium 2022 1.9    • Procalcitonin 2022 0.63 (A)   • TSH 07/10/2022 <0.005 (A)   • Free T-4 07/10/2022 1.35    • Lactic Acid 2022 1.1    • GFR (CKD-EPI) 2022 114    • Phosphorus 2022 3.7    • Troponin T 2022 37 (A)   • Thyroglobulin 2022 63.8 (A)   • Anti-Thyroglobulin 2022 <0.9    • Thyroglobulin by LC-MC/M* 2022 Not Applicable    • Microsomal -Tpo- Abs 07/10/2022 20.0 (A)   • Microsomal -Tpo- Abs 2022 10.7 (A)   • Report 2022                      Value:[facility] Emergency Dept.    Test Date:  2022  Pt Name:    ORLIN STAPLES                  Department: ER  MRN:        [id number]                      Room:       [facility]  Gender:     Male                         Technician: [id number]  :        1960                   Requested By:JAY WILLETT  Order #:    [id number]                    Reading MD:    Measurements  Intervals                                Axis  Rate:       94                           P:          -45  MO:         156                          QRS:        99  QRSD:       72                           T:          -3  QT:         356  QTc:        446    Interpretive Statements  SINUS OR ECTOPIC ATRIAL RHYTHM  BORDERLINE RIGHT  AXIS DEVIATION  BORDERLINE T WAVE ABNORMALITIES  Compared to ECG 07/10/2022 20:55:51  Ectopic atrial rhythm now present  T-wave abnormality now present  Sinus tachycardia no longer present     • Eject.Frac. MOD BP 07/11/2022 61.51    • Eject.Frac. MOD 4C 07/11/2022 61.7    • Eject.Frac. MOD 2C 07/11/2022 61.34    • Left Ventrical Ejection * 07/11/2022 65    • Prolactin 07/11/2022 3.79    • Amphetamines Urine 07/11/2022 Negative    • Barbiturates 07/11/2022 Negative    • Benzodiazepines 07/11/2022 Negative    • Cocaine Metabolite 07/11/2022 Negative    • Methadone 07/11/2022 Negative    • Opiates 07/11/2022 Negative    • Oxycodone 07/11/2022 Negative    • Phencyclidine -Pcp 07/11/2022 Negative    • Propoxyphene 07/11/2022 Negative    • Cannabinoid Metab 07/11/2022 Positive (A)   • Troponin T 07/11/2022 27 (A)   • WBC 07/12/2022 7.4    • RBC 07/12/2022 4.71    • Hemoglobin 07/12/2022 16.2    • Hematocrit 07/12/2022 46.9    • MCV 07/12/2022 99.6 (A)   • MCH 07/12/2022 34.4 (A)   • MCHC 07/12/2022 34.5    • RDW 07/12/2022 47.2    • Platelet Count 07/12/2022 188    • MPV 07/12/2022 9.2    • Neutrophils-Polys 07/12/2022 75.10 (A)   • Lymphocytes 07/12/2022 14.70 (A)   • Monocytes 07/12/2022 8.50    • Eosinophils 07/12/2022 0.50    • Basophils 07/12/2022 0.90    • Immature Granulocytes 07/12/2022 0.30    • Nucleated RBC 07/12/2022 0.00    • Neutrophils (Absolute) 07/12/2022 5.58    • Lymphs (Absolute) 07/12/2022 1.09    • Monos (Absolute) 07/12/2022 0.63    • Eos (Absolute) 07/12/2022 0.04    • Baso (Absolute) 07/12/2022 0.07    • Immature Granulocytes (a* 07/12/2022 0.02    • NRBC (Absolute) 07/12/2022 0.00    • Sodium 07/12/2022 133 (A)   • Potassium 07/12/2022 4.0    • Chloride 07/12/2022 101    • Co2 07/12/2022 20    • Glucose 07/12/2022 87    • Bun 07/12/2022 8    • Creatinine 07/12/2022 0.65    • Calcium 07/12/2022 8.8    • Anion Gap 07/12/2022 12.0    • GFR (CKD-EPI) 07/12/2022 108    • Procalcitonin 07/13/2022 0.43  (A)   • Vitamin B12 -True Cobala* 07/13/2022 261    • PT 07/13/2022 12.4    • INR 07/13/2022 0.93    • APTT 07/13/2022 29.0    • Reaction Time Initial-R 07/13/2022 7.8    • React Time Initial Hep 07/13/2022 7.4    • Clot Kinetics-K 07/13/2022 2.0    • Clot Angle-Angle 07/13/2022 66.5    • Maximum Clot Strength-MA 07/13/2022 53.0    • TEG Functional Fibrinoge* 07/13/2022 17.0    • Lysis 30 minutes-LY30 07/13/2022 0.2    • % Inhibition ADP 07/13/2022 13.9    • % Inhibition AA 07/13/2022 22.7 (A)   • TEG Algorithm 07/13/2022 Link Algorithm        Imaging:   DX-LUMBAR PUNCTURE FOR DIAGNOSIS   Final Result      Fluoroscopic-guided lumbar puncture as described above.   Opening pressure is 28 mm H20   Closing pressure is 12 mm H20 after withdrawing of 27 mL of CSF      MR-BRAIN-WITH & W/O   Final Result      1.  There is moderate to severe dilatation of the lateral and third ventricles. There is prominent sylvian cisterns compared with the frontoparietal sulci. There is also acute angulation of bilateral frontal horns. These findings likely represent normal    pressure hydrocephalus. The size of the ventricles are slightly increased since the previous CT scan dated 5/2/2022.   2.  Chronic infarcts in the left parietal and right frontal lobes.   3.  Moderate chronic microvascular ischemic disease.   4.  Mild cerebral volume loss.      EC-ECHOCARDIOGRAM COMPLETE W/O CONT   Final Result      CT-CTA CHEST PULMONARY ARTERY W/ RECONS   Final Result      1.  No pulmonary embolus. No acute abnormality in the chest.   2.  Emphysema.         CT-HEAD W/O   Final Result         1.  No acute intracranial findings. No evidence of acute hemorrhage or mass lesion.      2. Cerebral atrophy.      3.  White matter lucencies most consistent with chronic small vessel ischemic change.      4. Encephalomalacia in the left parietal and occipital region with ex vacuo enlargement of the posterior horn of the left lateral ventricle, similar to  prior.               DX-CHEST-PORTABLE (1 VIEW)   Final Result         Mild interstitial prominence could relate to fluid overload or atypical infection.            Problem Representation:   The patient is a 60 y/o M with PMH hyperthyroidism off medication, who presents with unwitnessed fall & LOC concerning for epilepsy vs syncope. Incidentally found to have normal pressure hydrocephalus during workup for possible epilepsy.    * Syncope and collapse- (present on admission)  Assessment & Plan  Patient reportedly found down at the [facility] on the fredo potty.  Patient does not remember episode or event.  Last memory going to the bathroom for bowel movement. CT of chest w/o PE, T4 w/n/l. EEG w/n/l so far. Collateral from patient's friend favors vasovagal syndrome.  - Appears to be more syncope versus seizure (no reported urinary incontinence during episode, tongue biting, postictal state, however structural changes on CT of brain puts patient at risk)  - Telemetry        Ataxic gait  Assessment & Plan  Possibly 2/2 NPH vs B12 deficiency  -B12 level ordered  -Plan to treat NPH w/ IR guided therapeutic LP    Normal pressure hydrocephalus (HCC)  Assessment & Plan  Found incidentally on MRI. Patient does have ataxic gait, difficulties recalling major health events, and when prompted, will agree he has had some progressive urinary incontinence. Possibly 2/2 repeat head traumas from falls, causing poor reabsorption of CSF.  - Therapeutic IR-guided LP; measure opening pressure to confirm MRI findings    Elevated troponin- (present on admission)  Assessment & Plan  Slightly elevated troponin with chest pain consistently for the past 3 days, unlikely to be focal cardiac origin. No dynamic EKG changes.    Pleuritic chest pain- (present on admission)  Assessment & Plan  Pleuritic chest pain present with patient for the past 3 days.  No history of blood clots, however wells score intermediate at 4.5. CTA chest w/o significant  findings. May represent costochondral injury 2/2 ground level fall.  - Troponin slightly elevated, peaked at around 40.  - No signs of ischemia on EKG    Acute hypoxemic respiratory failure (HCC)- (present on admission)  Assessment & Plan  Patient was started on 2L oxygen in ED, now w/o oxygen requirement    History of hyperthyroidism- (present on admission)  Assessment & Plan  Self-reported history of hyperthyroidism previously on methimazole, however no history on file. T4 w/n/l  - Records requested from outpt clinic in [place]    Atypical pneumonia- (present on admission)  Assessment & Plan  Per chest x-ray, see sepsis    Encephalomalacia- (present on admission)  Assessment & Plan  Encephalomalacia ex vacuo of left parietal and occipital lobes, unknown timeframe, patient denies prior seizures and stroke. Stable from previous CT scan, but architecture abnormalities raise suspicion for epilepsy as cause of LOC. May also be related to NPH.  - Requested records from outpt clinic in [place].    Lactic acidosis- resolved- (present on admission)  Assessment & Plan  Lactic acid 3.2 on admission, fluid resuscitation, antibiotics, resolved    Sepsis (HCC)- (present on admission)  Assessment & Plan  Sepsis Present on admission  SIRS criteria identified on my evaluation include: Fever, with temperature greater than 101 deg F, Tachycardia, with heart rate greater than 90 BPM and Tachypnea, with respirations greater than 20 per minute  Source was likely lung: chest x-ray revealing increased interstitial prominence suggesting atypical pneumonia  - Blood cultures x2 negative, patient denies sputum production  - Converting to oral augmentin [phone] to finish course of abx

## 2022-07-14 PROBLEM — A41.9 SEPSIS (HCC): Status: RESOLVED | Noted: 2022-07-10 | Resolved: 2022-07-14

## 2022-07-14 PROBLEM — E87.20 LACTIC ACIDOSIS: Status: RESOLVED | Noted: 2022-07-10 | Resolved: 2022-07-14

## 2022-07-14 PROBLEM — J96.01 ACUTE HYPOXEMIC RESPIRATORY FAILURE (HCC): Status: RESOLVED | Noted: 2022-07-10 | Resolved: 2022-07-14

## 2022-07-14 LAB
ANION GAP SERPL CALC-SCNC: 11 MMOL/L (ref 7–16)
BUN SERPL-MCNC: 5 MG/DL (ref 8–22)
CALCIUM SERPL-MCNC: 9.2 MG/DL (ref 8.5–10.5)
CHLORIDE SERPL-SCNC: 102 MMOL/L (ref 96–112)
CO2 SERPL-SCNC: 22 MMOL/L (ref 20–33)
CREAT SERPL-MCNC: 0.55 MG/DL (ref 0.5–1.4)
GFR SERPLBLD CREATININE-BSD FMLA CKD-EPI: 114 ML/MIN/1.73 M 2
GLUCOSE SERPL-MCNC: 94 MG/DL (ref 65–99)
POTASSIUM SERPL-SCNC: 4.4 MMOL/L (ref 3.6–5.5)
SODIUM SERPL-SCNC: 135 MMOL/L (ref 135–145)
T4BG SERPL-MCNC: 14.2 UG/ML (ref 13–30)

## 2022-07-14 PROCEDURE — A9270 NON-COVERED ITEM OR SERVICE: HCPCS

## 2022-07-14 PROCEDURE — 97162 PT EVAL MOD COMPLEX 30 MIN: CPT

## 2022-07-14 PROCEDURE — 80048 BASIC METABOLIC PNL TOTAL CA: CPT

## 2022-07-14 PROCEDURE — 770001 HCHG ROOM/CARE - MED/SURG/GYN PRIV*

## 2022-07-14 PROCEDURE — 97166 OT EVAL MOD COMPLEX 45 MIN: CPT

## 2022-07-14 PROCEDURE — A9270 NON-COVERED ITEM OR SERVICE: HCPCS | Performed by: INTERNAL MEDICINE

## 2022-07-14 PROCEDURE — 700111 HCHG RX REV CODE 636 W/ 250 OVERRIDE (IP)

## 2022-07-14 PROCEDURE — 36415 COLL VENOUS BLD VENIPUNCTURE: CPT

## 2022-07-14 PROCEDURE — 700102 HCHG RX REV CODE 250 W/ 637 OVERRIDE(OP): Performed by: INTERNAL MEDICINE

## 2022-07-14 PROCEDURE — 99233 SBSQ HOSP IP/OBS HIGH 50: CPT | Mod: GC | Performed by: INTERNAL MEDICINE

## 2022-07-14 PROCEDURE — 700102 HCHG RX REV CODE 250 W/ 637 OVERRIDE(OP)

## 2022-07-14 RX ORDER — CYANOCOBALAMIN 1000 UG/ML
1000 INJECTION, SOLUTION INTRAMUSCULAR; SUBCUTANEOUS DAILY
Status: DISCONTINUED | OUTPATIENT
Start: 2022-07-14 | End: 2022-07-16 | Stop reason: HOSPADM

## 2022-07-14 RX ADMIN — ACETAMINOPHEN 650 MG: 325 TABLET, FILM COATED ORAL at 16:06

## 2022-07-14 RX ADMIN — AMOXICILLIN AND CLAVULANATE POTASSIUM 1 TABLET: 875; 125 TABLET, FILM COATED ORAL at 05:20

## 2022-07-14 RX ADMIN — CYANOCOBALAMIN 1000 MCG: 1000 INJECTION, SOLUTION INTRAMUSCULAR; SUBCUTANEOUS at 09:44

## 2022-07-14 RX ADMIN — ACETAMINOPHEN 650 MG: 325 TABLET, FILM COATED ORAL at 09:57

## 2022-07-14 RX ADMIN — AMOXICILLIN AND CLAVULANATE POTASSIUM 1 TABLET: 875; 125 TABLET, FILM COATED ORAL at 16:05

## 2022-07-14 ASSESSMENT — COGNITIVE AND FUNCTIONAL STATUS - GENERAL
STANDING UP FROM CHAIR USING ARMS: A LITTLE
MOVING TO AND FROM BED TO CHAIR: A LITTLE
CLIMB 3 TO 5 STEPS WITH RAILING: A LITTLE
WALKING IN HOSPITAL ROOM: A LITTLE
DAILY ACTIVITIY SCORE: 24
SUGGESTED CMS G CODE MODIFIER DAILY ACTIVITY: CH
MOBILITY SCORE: 19
SUGGESTED CMS G CODE MODIFIER MOBILITY: CK
MOVING FROM LYING ON BACK TO SITTING ON SIDE OF FLAT BED: A LITTLE

## 2022-07-14 ASSESSMENT — ENCOUNTER SYMPTOMS
FLANK PAIN: 0
WEIGHT LOSS: 0
COUGH: 1
NAUSEA: 1
ABDOMINAL PAIN: 0
CHILLS: 1
HEMOPTYSIS: 0
DIARRHEA: 1
MUSCULOSKELETAL NEGATIVE: 1
CONSTIPATION: 0
EYES NEGATIVE: 1
FEVER: 1
SPUTUM PRODUCTION: 0
SORE THROAT: 0
BLOOD IN STOOL: 0
CARDIOVASCULAR NEGATIVE: 1
PALPITATIONS: 0
WHEEZING: 0
VOMITING: 1
PSYCHIATRIC NEGATIVE: 1
SHORTNESS OF BREATH: 0
LOSS OF CONSCIOUSNESS: 1
DIAPHORESIS: 0

## 2022-07-14 ASSESSMENT — FIBROSIS 4 INDEX: FIB4 SCORE: 1.85

## 2022-07-14 ASSESSMENT — GAIT ASSESSMENTS
ASSISTIVE DEVICE: 4 WHEEL WALKER
DISTANCE (FEET): 200
GAIT LEVEL OF ASSIST: CONTACT GUARD ASSIST
DEVIATION: INCREASED BASE OF SUPPORT;SHUFFLED GAIT;DECREASED HEEL STRIKE;DECREASED TOE OFF

## 2022-07-14 ASSESSMENT — ACTIVITIES OF DAILY LIVING (ADL): TOILETING: INDEPENDENT

## 2022-07-14 NOTE — THERAPY
Occupational Therapy   Initial Evaluation     Patient Name: James Hurley  Age:  61 y.o., Sex:  male  Medical Record #: [id number]  Today's Date: 7/14/2022     Precautions  Precautions: Fall Risk    Assessment  Patient is 61 y.o. male with a diagnosis of LOC found down.   Pt is at or near his/her functional baseline. Pt with no further skilled OT needs in the acute care setting at this time.         Plan    Occupational Therapy for Evaluation only        Discharge Recommendations: (P) Anticipate that the patient will have no further occupational therapy needs after discharge from the hospital        07/14/22 1024   Prior Living Situation   Prior Services None   Housing / Facility Homeless   Equipment Owned None   Lives with - Patient's Self Care Capacity Alone and Able to Care For Self   Prior Level of ADL Function   Self Feeding Independent   Grooming / Hygiene Independent   Bathing Independent   Dressing Independent   Toileting Independent   ADL Assessment   Grooming Supervision   Upper Body Dressing Supervision   Lower Body Dressing Supervision   Toileting Supervision   Functional Mobility   Sit to Stand Supervised   Bed, Chair, Wheelchair Transfer Supervised   Anticipated Discharge Equipment and Recommendations   Discharge Recommendations Anticipate that the patient will have no further occupational therapy needs after discharge from the hospital

## 2022-07-14 NOTE — PROGRESS NOTES
Daily Progress Note:     Date of Service: 7/14/2022  Primary Team: [facility] Team   Attending: Say Massey M.D.   Senior Resident: Dr. Leon  Intern: Dr. Muñoz  Contact:  [phone]    Chief Complaint:   Found Down    Subjective  The patient is a 62 y/o M with PMH hyperthyroidism off medication, who presented with >1 month history of regular fainting spells. Overnight, IR LP drained 27mL of CSF. No new syncopal or seizure events overnight. No new chest pain, abdominal pain. Patient is unsure if his urinary incontinence has improved, and still feels his gait is unstable after procedure.    Consultants/Specialty:  Neurology    Review of Systems:    Review of Systems   Constitutional: Positive for chills, fever and malaise/fatigue. Negative for diaphoresis and weight loss.   HENT: Negative.  Negative for hearing loss and sore throat.    Eyes: Negative.    Respiratory: Positive for cough. Negative for hemoptysis, sputum production, shortness of breath and wheezing.    Cardiovascular: Negative.  Negative for chest pain and palpitations.   Gastrointestinal: Positive for diarrhea, nausea and vomiting. Negative for abdominal pain, blood in stool, constipation and melena.   Genitourinary: Negative.  Negative for dysuria, flank pain, hematuria and urgency.   Musculoskeletal: Negative.    Skin: Negative.    Neurological: Positive for loss of consciousness.   Endo/Heme/Allergies: Negative.    Psychiatric/Behavioral: Negative.        Objective Data:   Physical Exam:   Vitals:   Temp:  [36.5 °C (97.7 °F)-37.4 °C (99.3 °F)] 36.5 °C (97.7 °F)  Pulse:  [75-89] 75  Resp:  [18] 18  BP: (127-158)/(83-89) 129/84  SpO2:  [90 %-93 %] 90 %    Physical Exam  Constitutional:       General: He is not in acute distress.     Appearance: He is normal weight.   HENT:      Head: Normocephalic and atraumatic.      Mouth/Throat:      Mouth: Mucous membranes are moist.      Pharynx: Oropharynx is clear.   Eyes:      Extraocular Movements:  Extraocular movements intact.      Pupils: Pupils are equal, round, and reactive to light.   Cardiovascular:      Rate and Rhythm: Normal rate and regular rhythm.      Pulses: Normal pulses.      Heart sounds: Normal heart sounds, S1 normal and S2 normal.   Pulmonary:      Effort: Pulmonary effort is normal.      Breath sounds: Wheezing present.   Abdominal:      General: Abdomen is flat. Bowel sounds are normal.      Palpations: Abdomen is soft.      Tenderness: There is abdominal tenderness (in RUQ; palpable, smooth liver edge 2cm below costal margin).   Genitourinary:     Penis: Normal.       Testes: Normal.   Musculoskeletal:         General: Normal range of motion.      Cervical back: Normal range of motion.   Skin:     General: Skin is warm and dry.      Capillary Refill: Capillary refill takes less than 2 seconds.   Neurological:      General: No focal deficit present.      Mental Status: He is alert and oriented to person, place, and time.   Psychiatric:         Mood and Affect: Mood normal.         Thought Content: Thought content normal. Thought content does not include homicidal or suicidal ideation.       Labs:   Recent Labs     07/12/22  0048   WBC 7.4   RBC 4.71   HEMOGLOBIN 16.2   HEMATOCRIT 46.9   MCV 99.6*   MCH 34.4*   RDW 47.2   PLATELETCT 188   MPV 9.2   NEUTSPOLYS 75.10*   LYMPHOCYTES 14.70*   MONOCYTES 8.50   EOSINOPHILS 0.50   BASOPHILS 0.90     Recent Labs     07/12/22  0048 07/14/22  0744   SODIUM 133* 135   POTASSIUM 4.0 4.4   CHLORIDE 101 102   CO2 20 22   GLUCOSE 87 94   BUN 8 5*       Imaging:   DX-LUMBAR PUNCTURE FOR DIAGNOSIS   Final Result      Fluoroscopic-guided lumbar puncture as described above.   Opening pressure is 28 mm H20   Closing pressure is 12 mm H20 after withdrawing of 27 mL of CSF      MR-BRAIN-WITH & W/O   Final Result      1.  There is moderate to severe dilatation of the lateral and third ventricles. There is prominent sylvian cisterns compared with the  frontoparietal sulci. There is also acute angulation of bilateral frontal horns. These findings likely represent normal    pressure hydrocephalus. The size of the ventricles are slightly increased since the previous CT scan dated 5/2/2022.   2.  Chronic infarcts in the left parietal and right frontal lobes.   3.  Moderate chronic microvascular ischemic disease.   4.  Mild cerebral volume loss.      EC-ECHOCARDIOGRAM COMPLETE W/O CONT   Final Result      CT-CTA CHEST PULMONARY ARTERY W/ RECONS   Final Result      1.  No pulmonary embolus. No acute abnormality in the chest.   2.  Emphysema.         CT-HEAD W/O   Final Result         1.  No acute intracranial findings. No evidence of acute hemorrhage or mass lesion.      2. Cerebral atrophy.      3.  White matter lucencies most consistent with chronic small vessel ischemic change.      4. Encephalomalacia in the left parietal and occipital region with ex vacuo enlargement of the posterior horn of the left lateral ventricle, similar to prior.               DX-CHEST-PORTABLE (1 VIEW)   Final Result         Mild interstitial prominence could relate to fluid overload or atypical infection.            Problem Representation:   The patient is a 62 y/o M with PMH hyperthyroidism off medication, who presents with unwitnessed fall & LOC concerning for epilepsy vs syncope. Incidentally found to have normal pressure hydrocephalus findings on MRI during workup for possible epilepsy. Following patient post-LP for improvement of chronic ataxia and incontinence.    * Syncope and collapse- (present on admission)  Assessment & Plan  Patient reportedly found down at the [facility] on the [facility].  Patient does not remember episode or event.  Last memory going to the bathroom for bowel movement. CT of chest w/o PE, T4 w/n/l. EEG w/n/l so far. Collateral from patient's friend favors vasovagal syndrome.  - Appears to be more syncope versus seizure (no reported urinary incontinence  during episode, tongue biting, postictal state, however structural changes on CT of brain puts patient at risk)  - Telemetry        Ataxic gait  Assessment & Plan  Possibly 2/2 NPH vs B12 deficiency. No acute change in gait after LP, but longstanding NPH may need time to show signs of improvement.  -B12 low, but within normal limits. Continuing repletion      Normal pressure hydrocephalus (HCC)  Assessment & Plan  Found incidentally on MRI. Patient does have ataxic gait, difficulties recalling major health events, and when prompted, will agree he has had some progressive urinary incontinence. Possibly 2/2 repeat head traumas from falls, causing poor reabsorption of CSF. Therapeutic IR-guided LP found slightly elevated opening pressure of 28 mm H2O. Will follow course post-LP.    Elevated troponin- (present on admission)  Assessment & Plan  Slightly elevated troponin with chest pain consistently for the past 3 days, unlikely to be focal cardiac origin. No dynamic EKG changes.    Pleuritic chest pain- (present on admission)  Assessment & Plan  Pleuritic chest pain present with patient for the past 3 days.  No history of blood clots, however wells score intermediate at 4.5. CTA chest w/o significant findings. May represent costochondral injury 2/2 ground level fall.  - Troponin slightly elevated, peaked at around 40.  - No signs of ischemia on EKG    Acute hypoxemic respiratory failure (HCC)- (present on admission)  Assessment & Plan  Patient was started on 2L oxygen in ED, now w/o oxygen requirement    History of hyperthyroidism- (present on admission)  Assessment & Plan  Self-reported history of hyperthyroidism previously on methimazole, however no history on file. T4 w/n/l  - Records requested from outpt clinic in [place]    Atypical pneumonia- (present on admission)  Assessment & Plan  Per chest x-ray, see sepsis  Finished course of macrolides    Encephalomalacia- (present on admission)  Assessment &  Plan  Encephalomalacia ex vacuo of left parietal and occipital lobes, unknown timeframe, patient denies prior seizures and stroke. Stable from previous CT scan, but architecture abnormalities raise suspicion for epilepsy as cause of LOC. May also be related to NPH.  - Requested records from outpt clinic in [place].    Lactic acidosis- resolved- (present on admission)  Assessment & Plan  Lactic acid 3.2 on admission, fluid resuscitation, antibiotics, resolved    Sepsis (HCC)- (present on admission)  Assessment & Plan  Sepsis Present on admission  SIRS criteria identified on my evaluation include: Fever, with temperature greater than 101 deg F, Tachycardia, with heart rate greater than 90 BPM and Tachypnea, with respirations greater than 20 per minute  Source was likely lung: chest x-ray revealing increased interstitial prominence suggesting atypical pneumonia  - Blood cultures x2 negative, patient denies sputum production  - Converting to oral augmentin 875-125 to finish course of empiric abx

## 2022-07-14 NOTE — PROGRESS NOTES
Bedside report received from night shift RN. Assumed care at 0700. Pt is A&Ox4. Pt states a headache pain of 4/10. Pt was updated on plan of care. Pt has call light, personal belongings, and bedside table within reach.     Pt unplugged his bed alarm and ambulated to the restroom on their own. Pt has been educated on both fall education and risks. Bed is in the lowest position and bed alarm is on.

## 2022-07-14 NOTE — CARE PLAN
The patient is Watcher - Medium risk of patient condition declining or worsening    Shift Goals  Clinical Goals: Safety, d/c in AM  Patient Goals: Rest, Eat    Progress made toward(s) clinical / shift goals:    Problem: Knowledge Deficit - Standard  Goal: Patient and family/care givers will demonstrate understanding of plan of care, disease process/condition, diagnostic tests and medications  Outcome: Progressing     Problem: Fall Risk  Goal: Patient will remain free from falls  Outcome: Progressing       Patient has verbalized his understanding of his plan of care.

## 2022-07-14 NOTE — CARE PLAN
The patient is Stable - Low risk of patient condition declining or worsening    Shift Goals  Clinical Goals: Saftey, pain management  Patient Goals: Rest, Eat    Progress made toward(s) clinical / shift goals:        Problem: Pain - Standard  Goal: Alleviation of pain or a reduction in pain to the patient’s comfort goal  Outcome: Progressing     Problem: Knowledge Deficit - Standard  Goal: Patient and family/care givers will demonstrate understanding of plan of care, disease process/condition, diagnostic tests and medications  Outcome: Progressing      Patient update on POC, all questions answered.        Patient is not progressing towards the following goals:    Problem: Fall Risk  Goal: Patient will remain free from falls  Outcome: Not Progressing    Pt disconnecting the bed alarm and ambulating to the restroom on their own. Pt provided fall education/risks.  Necessary fall precautions in place: non-slip socks, bed locked and in lowest position, bed alarm on, call light within reach. Patient has remained free from falls.

## 2022-07-14 NOTE — THERAPY
Physical Therapy   Initial Evaluation     Patient Name: James Hurley  Age:  61 y.o., Sex:  male  Medical Record #: [id number]  Today's Date: 7/14/2022     Precautions  Precautions: Fall Risk    Assessment  Patient is 61 y.o. male with PMHx of hyperthyroidism, presented to ED on 7/10 due to LOC and CP. Pt was found down in bathroom at [facility].   Pt presenting seen for PT mobility assessment, Pt demonstrated the ability to ambulate with 4WW for over 200 ft w/o LOB. Pt had long standing sensory deficits in feet and wide based gait pattern.   Recommend pt be OOB for meals and ambulate with staff in hallway with 4WW 1-2 x/day. No skilled inpatient PT services indicated at this time. .      Plan    Recommend Physical Therapy for Evaluation only. DC needs only. Patient will not be actively followed for physical therapy services at this time, however may be seen if requested by physician for 1 more visit within 30 days to address any discharge or equipment needs.      DC Equipment Recommendations: None  Discharge Recommendations: Anticipate that the patient will have no further physical therapy needs after discharge from the hospital          Objective     07/14/22 1018   Prior Living Situation   Prior Services None   Housing / Facility Other (Comments)  ([facility],)   Steps Into Home 0   Steps In Home 0   Bathroom Set up Walk In Shower   Equipment Owned 4-Wheel Walker   Lives with - Patient's Self Care Capacity Other (Comments)  ([facility] at [facility])   Prior Level of Functional Mobility   Bed Mobility Independent   Transfer Status Independent   Ambulation Independent   Distance Ambulation (Feet)   (short community)   Assistive Devices Used 4-Wheel Walker   History of Falls   History of Falls Yes   Date of Last Fall 07/10/22   Cognition    Level of Consciousness Alert   Comments pleasant and cooperative   Active ROM Lower Body    Active ROM Lower Body  WDL   Strength Lower Body   Lower Body Strength  WDL   Sensation  Lower Body   Lower Extremity Sensation   X   Paresthesia Present    Comments B feet and hands.   Lower Body Muscle Tone   Lower Body Muscle Tone  WDL   Coordination Upper Body   Coordination X   Fine Motor Coordination impaired B hands.   Vision   Vision Comments no c/o visual difficulty   Balance Assessment   Sitting Balance (Static) Good   Sitting Balance (Dynamic) Good   Standing Balance (Static) Fair +   Standing Balance (Dynamic) Fair   Weight Shift Sitting Good   Weight Shift Standing Fair   Comments w/4WW   Gait Analysis   Gait Level Of Assist Contact Guard Assist   Assistive Device 4 Wheel Walker   Distance (Feet) 200   # of Times Distance was Traveled 1   Deviation Increased Base Of Support;Shuffled Gait;Decreased Heel Strike;Decreased Toe Off  (wide based)   # of Stairs Climbed 0   Weight Bearing Status no restrictions   Comments Pt has h/o numbness in bilateral feet, requires use of walker, states he periodically hits feet on walker and has tripped.   Bed Mobility    Supine to Sit Independent   Sit to Supine Independent   Scooting Independent   Rolling Independent   Functional Mobility   Sit to Stand Standby Assist   Bed, Chair, Wheelchair Transfer Standby Assist   Transfer Method Stand Step   Mobility supine >EOB> sit to stand> gait in hallway>BTB   How much difficulty does the patient currently have...   Turning over in bed (including adjusting bedclothes, sheets and blankets)? 4   Sitting down on and standing up from a chair with arms (e.g., wheelchair, bedside commode, etc.) 3   Moving from lying on back to sitting on the side of the bed? 3   How much help from another person does the patient currently need...   Moving to and from a bed to a chair (including a wheelchair)? 3   Need to walk in a hospital room? 3   Climbing 3-5 steps with a railing? 3   6 clicks Mobility Score 19   Activity Tolerance   Sitting Edge of Bed 10   Standing 10   Patient / Family Goals    Patient / Family Goal #1 return to  [facility]   Problem List    Problems   (long standing sensory and coordination deficits)   Interdisciplinary Plan of Care Collaboration   Collaboration Comments RN instructed to have pt be OOB for meals and walk in hallway with 4WW and staff supervision during acute stay.

## 2022-07-14 NOTE — PROGRESS NOTES
Bedside report received from day RN, pt care assumed, assessment completed. Pt is A&O 4, pain 0/10. Updated on POC, questions answered. Bed in lowest, locked position, treaded socks on, call light and belongings within reach. Fall precautions in place.

## 2022-07-15 LAB
ANION GAP SERPL CALC-SCNC: 11 MMOL/L (ref 7–16)
BACTERIA BLD CULT: NORMAL
BACTERIA BLD CULT: NORMAL
BUN SERPL-MCNC: 8 MG/DL (ref 8–22)
CALCIUM SERPL-MCNC: 9.6 MG/DL (ref 8.5–10.5)
CHLORIDE SERPL-SCNC: 99 MMOL/L (ref 96–112)
CO2 SERPL-SCNC: 25 MMOL/L (ref 20–33)
CREAT SERPL-MCNC: 0.6 MG/DL (ref 0.5–1.4)
GFR SERPLBLD CREATININE-BSD FMLA CKD-EPI: 111 ML/MIN/1.73 M 2
GLUCOSE SERPL-MCNC: 102 MG/DL (ref 65–99)
POTASSIUM SERPL-SCNC: 4.6 MMOL/L (ref 3.6–5.5)
PROCALCITONIN SERPL-MCNC: 0.22 NG/ML
SIGNIFICANT IND 70042: NORMAL
SIGNIFICANT IND 70042: NORMAL
SITE SITE: NORMAL
SITE SITE: NORMAL
SODIUM SERPL-SCNC: 135 MMOL/L (ref 135–145)
SOURCE SOURCE: NORMAL
SOURCE SOURCE: NORMAL

## 2022-07-15 PROCEDURE — A9270 NON-COVERED ITEM OR SERVICE: HCPCS

## 2022-07-15 PROCEDURE — 770001 HCHG ROOM/CARE - MED/SURG/GYN PRIV*

## 2022-07-15 PROCEDURE — 700102 HCHG RX REV CODE 250 W/ 637 OVERRIDE(OP): Performed by: INTERNAL MEDICINE

## 2022-07-15 PROCEDURE — 99232 SBSQ HOSP IP/OBS MODERATE 35: CPT | Mod: GC | Performed by: INTERNAL MEDICINE

## 2022-07-15 PROCEDURE — 80048 BASIC METABOLIC PNL TOTAL CA: CPT

## 2022-07-15 PROCEDURE — A9270 NON-COVERED ITEM OR SERVICE: HCPCS | Performed by: INTERNAL MEDICINE

## 2022-07-15 PROCEDURE — 700111 HCHG RX REV CODE 636 W/ 250 OVERRIDE (IP)

## 2022-07-15 PROCEDURE — 700102 HCHG RX REV CODE 250 W/ 637 OVERRIDE(OP)

## 2022-07-15 PROCEDURE — 36415 COLL VENOUS BLD VENIPUNCTURE: CPT

## 2022-07-15 PROCEDURE — 84145 PROCALCITONIN (PCT): CPT

## 2022-07-15 RX ORDER — ALBUTEROL SULFATE 90 UG/1
2 AEROSOL, METERED RESPIRATORY (INHALATION) EVERY 6 HOURS PRN
Qty: 18 G | Refills: 1 | Status: ON HOLD | OUTPATIENT
Start: 2022-07-15 | End: 2023-03-12

## 2022-07-15 RX ORDER — CHOLECALCIFEROL (VITAMIN D3) 25 MCG
1000 TABLET,CHEWABLE ORAL DAILY
Qty: 30 TABLET | Refills: 1 | Status: ON HOLD | OUTPATIENT
Start: 2022-07-15 | End: 2023-03-12

## 2022-07-15 RX ADMIN — ENOXAPARIN SODIUM 40 MG: 40 INJECTION SUBCUTANEOUS at 16:45

## 2022-07-15 RX ADMIN — ACETAMINOPHEN 650 MG: 325 TABLET, FILM COATED ORAL at 08:52

## 2022-07-15 RX ADMIN — CYANOCOBALAMIN 1000 MCG: 1000 INJECTION, SOLUTION INTRAMUSCULAR; SUBCUTANEOUS at 05:35

## 2022-07-15 RX ADMIN — AMOXICILLIN AND CLAVULANATE POTASSIUM 1 TABLET: 875; 125 TABLET, FILM COATED ORAL at 05:34

## 2022-07-15 RX ADMIN — AMOXICILLIN AND CLAVULANATE POTASSIUM 1 TABLET: 875; 125 TABLET, FILM COATED ORAL at 16:45

## 2022-07-15 RX ADMIN — ACETAMINOPHEN 650 MG: 325 TABLET, FILM COATED ORAL at 16:45

## 2022-07-15 ASSESSMENT — ENCOUNTER SYMPTOMS
SORE THROAT: 0
SHORTNESS OF BREATH: 0
CONSTIPATION: 0
WEIGHT LOSS: 0
FLANK PAIN: 0
DIARRHEA: 1
MUSCULOSKELETAL NEGATIVE: 1
CHILLS: 1
LOSS OF CONSCIOUSNESS: 1
WHEEZING: 0
DIAPHORESIS: 0
COUGH: 1
SPUTUM PRODUCTION: 0
NAUSEA: 1
PSYCHIATRIC NEGATIVE: 1
FEVER: 1
EYES NEGATIVE: 1
CARDIOVASCULAR NEGATIVE: 1
ABDOMINAL PAIN: 0
HEMOPTYSIS: 0
BLOOD IN STOOL: 0
PALPITATIONS: 0
VOMITING: 1

## 2022-07-15 ASSESSMENT — PAIN DESCRIPTION - PAIN TYPE: TYPE: ACUTE PAIN

## 2022-07-15 ASSESSMENT — FIBROSIS 4 INDEX: FIB4 SCORE: 1.85

## 2022-07-15 NOTE — PROGRESS NOTES
Bedside report received from day RN, pt care assumed, assessment completed. Pt is A&O 4, denies current pain, pt ambulated to the bathroom much steadier than previous shifts. Updated on POC, questions answered. Bed in lowest, locked position, treaded socks on, call light and belongings within reach. Fall precautions in place.

## 2022-07-15 NOTE — PROGRESS NOTES
Daily Progress Note:     Date of Service: 7/15/2022  Primary Team: [facility] Team   Attending: Say Massey M.D.   Senior Resident: Dr. Leon  Intern: Dr. Muñoz  Contact:  [phone]    Chief Complaint:   Found Down    Subjective  The patient is a 62 y/o M with PMH hyperthyroidism off medication, who presented with >1 month history of regular fainting spells. No new syncopal or seizure events overnight. No new chest pain, abdominal pain. Patient today is confident his urinary incontinence has improved, but feels no improvement in his gait. Some throbbing, bilateral headaches overnight and yesterday evening in setting of recent LP; well controlled by PRN tylenol on order.    Consultants/Specialty:  Neurology    Review of Systems:    Review of Systems   Constitutional: Positive for chills, fever and malaise/fatigue. Negative for diaphoresis and weight loss.   HENT: Negative.  Negative for hearing loss and sore throat.    Eyes: Negative.    Respiratory: Positive for cough. Negative for hemoptysis, sputum production, shortness of breath and wheezing.    Cardiovascular: Negative.  Negative for chest pain and palpitations.   Gastrointestinal: Positive for diarrhea, nausea and vomiting. Negative for abdominal pain, blood in stool, constipation and melena.   Genitourinary: Negative.  Negative for dysuria, flank pain, hematuria and urgency.   Musculoskeletal: Negative.    Skin: Negative.    Neurological: Positive for loss of consciousness.   Endo/Heme/Allergies: Negative.    Psychiatric/Behavioral: Negative.        Objective Data:   Physical Exam:   Vitals:   Temp:  [36.4 °C (97.5 °F)-37.2 °C (99 °F)] 37.2 °C (99 °F)  Pulse:  [80-88] 88  Resp:  [18] 18  BP: (113-123)/(69-85) 113/69  SpO2:  [90 %-93 %] 92 %    Physical Exam  Constitutional:       General: He is not in acute distress.     Appearance: He is normal weight.   HENT:      Head: Normocephalic and atraumatic.      Mouth/Throat:      Mouth: Mucous membranes are  moist.      Pharynx: Oropharynx is clear.   Eyes:      Extraocular Movements: Extraocular movements intact.      Pupils: Pupils are equal, round, and reactive to light.   Cardiovascular:      Rate and Rhythm: Normal rate and regular rhythm.      Pulses: Normal pulses.      Heart sounds: Normal heart sounds, S1 normal and S2 normal.   Pulmonary:      Effort: Pulmonary effort is normal.      Breath sounds: Wheezing present.   Abdominal:      General: Abdomen is flat. Bowel sounds are normal.      Palpations: Abdomen is soft.      Tenderness: There is abdominal tenderness (in RUQ; palpable, smooth liver edge 2cm below costal margin).   Genitourinary:     Penis: Normal.       Testes: Normal.   Musculoskeletal:         General: Normal range of motion.      Cervical back: Normal range of motion.   Skin:     General: Skin is warm and dry.      Capillary Refill: Capillary refill takes less than 2 seconds.   Neurological:      General: No focal deficit present.      Mental Status: He is alert and oriented to person, place, and time.   Psychiatric:         Mood and Affect: Mood normal.         Thought Content: Thought content normal. Thought content does not include homicidal or suicidal ideation.       Labs:   No results for input(s): WBC, RBC, HEMOGLOBIN, HEMATOCRIT, MCV, MCH, RDW, PLATELETCT, MPV, NEUTSPOLYS, LYMPHOCYTES, MONOCYTES, EOSINOPHILS, BASOPHILS, RBCMORPHOLO in the last 72 hours.  Recent Labs     07/14/22  0744 07/15/22  0040   SODIUM 135 135   POTASSIUM 4.4 4.6   CHLORIDE 102 99   CO2 22 25   GLUCOSE 94 102*   BUN 5* 8       Imaging:   DX-LUMBAR PUNCTURE FOR DIAGNOSIS   Final Result      Fluoroscopic-guided lumbar puncture as described above.   Opening pressure is 28 mm H20   Closing pressure is 12 mm H20 after withdrawing of 27 mL of CSF      MR-BRAIN-WITH & W/O   Final Result      1.  There is moderate to severe dilatation of the lateral and third ventricles. There is prominent sylvian cisterns compared with  the frontoparietal sulci. There is also acute angulation of bilateral frontal horns. These findings likely represent normal    pressure hydrocephalus. The size of the ventricles are slightly increased since the previous CT scan dated 5/2/2022.   2.  Chronic infarcts in the left parietal and right frontal lobes.   3.  Moderate chronic microvascular ischemic disease.   4.  Mild cerebral volume loss.      EC-ECHOCARDIOGRAM COMPLETE W/O CONT   Final Result      CT-CTA CHEST PULMONARY ARTERY W/ RECONS   Final Result      1.  No pulmonary embolus. No acute abnormality in the chest.   2.  Emphysema.         CT-HEAD W/O   Final Result         1.  No acute intracranial findings. No evidence of acute hemorrhage or mass lesion.      2. Cerebral atrophy.      3.  White matter lucencies most consistent with chronic small vessel ischemic change.      4. Encephalomalacia in the left parietal and occipital region with ex vacuo enlargement of the posterior horn of the left lateral ventricle, similar to prior.               DX-CHEST-PORTABLE (1 VIEW)   Final Result         Mild interstitial prominence could relate to fluid overload or atypical infection.            Problem Representation:   The patient is a 60 y/o M with PMH hyperthyroidism off medication, who presents with unwitnessed fall & LOC concerning for epilepsy vs syncope. Incidentally found to have normal pressure hydrocephalus findings on MRI during workup for possible epilepsy. Following patient post-LP for improvement of chronic ataxia and incontinence.    * Syncope and collapse- (present on admission)  Assessment & Plan  Patient reportedly found down at the [facility] on the [facility].  Patient does not remember episode or event.  Last memory going to the bathroom for bowel movement. CT of chest w/o PE, T4 w/n/l. EEG w/n/l so far. Collateral from patient's friend favors vasovagal syndrome.  - Appears to be more syncope versus seizure (no reported urinary  incontinence during episode, tongue biting, postictal state, however structural changes on CT of brain puts patient at risk)  - OK to d/c Telemetry & transfer to neuro [facility] if beds are available        Ataxic gait  Assessment & Plan  Possibly 2/2 NPH vs B12 deficiency. No acute change in gait after LP, but longstanding NPH may need time to show signs of improvement.  -B12 low, but within normal limits. Continuing repletion      Normal pressure hydrocephalus (HCC)  Assessment & Plan  Found incidentally on MRI. Patient does have ataxic gait, difficulties recalling major health events, and when prompted, will agree he has had some progressive urinary incontinence. Possibly 2/2 repeat head traumas from falls, causing poor reabsorption of CSF. Therapeutic IR-guided LP found slightly elevated opening pressure of 28 mm H2O. Will follow course post-LP.  - Will consult Neurosurgery for treatment; shunt placement vs starting diamox.    Elevated troponin- (present on admission)  Assessment & Plan  Slightly elevated troponin with chest pain consistently for the past 3 days, unlikely to be focal cardiac origin. No dynamic EKG changes.    Pleuritic chest pain- (present on admission)  Assessment & Plan  Pleuritic chest pain present with patient for the past 3 days.  No history of blood clots, however wells score intermediate at 4.5. CTA chest w/o significant findings. May represent costochondral injury 2/2 ground level fall.  - Troponin slightly elevated, peaked at around 40.  - No signs of ischemia on EKG    History of hyperthyroidism- (present on admission)  Assessment & Plan  Self-reported history of hyperthyroidism previously on methimazole ~4 months, however no history on file. T4 w/n/l despite non-detectable TSH.   - Records requested from outpt clinic in [place]  - Considering consult with endocrinology    Atypical pneumonia- (present on admission)  Assessment & Plan  Per chest x-ray, see sepsis  Finished course of  macrolides    Encephalomalacia- (present on admission)  Assessment & Plan  Encephalomalacia ex vacuo of left parietal and occipital lobes, unknown timeframe, patient denies prior seizures and stroke. Stable from previous CT scan. May also be related to NPH.  - Requested records from outpt clinic in [place].

## 2022-07-15 NOTE — CARE PLAN
The patient is Watcher - Medium risk of patient condition declining or worsening    Shift Goals  Clinical Goals: Safety  Patient Goals: Rest, ambulate more    Progress made toward(s) clinical / shift goals:    Problem: Knowledge Deficit - Standard  Goal: Patient and family/care givers will demonstrate understanding of plan of care, disease process/condition, diagnostic tests and medications  Outcome: Progressing     Problem: Pain - Standard  Goal: Alleviation of pain or a reduction in pain to the patient’s comfort goal  Outcome: Progressing     Problem: Fall Risk  Goal: Patient will remain free from falls  Outcome: Progressing

## 2022-07-15 NOTE — PROGRESS NOTES
Bedside report received from night shift RN. Assumed care at 0700. Pt is A&Ox4. Pt states a headache pain 8/10 and medicated per MAR. Pt was updated on plan of care. Pt has call light, personal belongings, and bedside table within reach. Bed is in the lowest position and bed alarm is on.

## 2022-07-15 NOTE — CARE PLAN
The patient is Stable - Low risk of patient condition declining or worsening    Shift Goals  Clinical Goals: Safety  Patient Goals: Rest, ambulate more    Progress made toward(s) clinical / shift goals:        Problem: Pain - Standard  Goal: Alleviation of pain or a reduction in pain to the patient’s comfort goal  Outcome: Progressing    Pt medicated per MAR.     Problem: Knowledge Deficit - Standard  Goal: Patient and family/care givers will demonstrate understanding of plan of care, disease process/condition, diagnostic tests and medications  Outcome: Progressing       Patient is not progressing towards the following goals:      Problem: Fall Risk  Goal: Patient will remain free from falls  Outcome: Not Progressing     Pt continues to unplug or silence the bed alarm to use the restroom.

## 2022-07-16 ENCOUNTER — PHARMACY VISIT (OUTPATIENT)
Dept: PHARMACY | Facility: MEDICAL CENTER | Age: 62
End: 2022-07-16
Payer: COMMERCIAL

## 2022-07-16 VITALS
RESPIRATION RATE: 16 BRPM | WEIGHT: 137.57 LBS | HEART RATE: 85 BPM | TEMPERATURE: 97.7 F | OXYGEN SATURATION: 94 % | HEIGHT: 71 IN | DIASTOLIC BLOOD PRESSURE: 94 MMHG | SYSTOLIC BLOOD PRESSURE: 135 MMHG | BODY MASS INDEX: 19.26 KG/M2

## 2022-07-16 PROCEDURE — 99239 HOSP IP/OBS DSCHRG MGMT >30: CPT | Mod: GC | Performed by: INTERNAL MEDICINE

## 2022-07-16 PROCEDURE — 94760 N-INVAS EAR/PLS OXIMETRY 1: CPT

## 2022-07-16 PROCEDURE — RXMED WILLOW AMBULATORY MEDICATION CHARGE: Performed by: STUDENT IN AN ORGANIZED HEALTH CARE EDUCATION/TRAINING PROGRAM

## 2022-07-16 PROCEDURE — 700111 HCHG RX REV CODE 636 W/ 250 OVERRIDE (IP)

## 2022-07-16 RX ADMIN — CYANOCOBALAMIN 1000 MCG: 1000 INJECTION, SOLUTION INTRAMUSCULAR; SUBCUTANEOUS at 04:37

## 2022-07-16 NOTE — PROGRESS NOTES
SBAR w MD re POC d/t DC orders w last note indicating need to decide med management or shunt w neuro team.    1310 Text MD; pt becoming agitated over waiting for neuro consult. Any update on timing? MD reported neurosurg to round this afternoon after a noon case. I conveyed same to pt    1515 Neurosurg MD on unit, cleared pt for DC pending flup w/in 2 weeks for shunt placement. I updated attending who asks that I add neurosurg flup instructions to DC info.  Per Neuro:  Dr. EDIE Swain  [facility] Neurosurgery  [address] [address]

## 2022-07-16 NOTE — PROGRESS NOTES
Pt received all [facility] w [facility].  Rcvd meds to beds. VSS, NAD, denies n/v/d, SOB, CP. Speaking complete coherent sentences.  Pt ambulating at baseline, independent w 4WW.

## 2022-07-16 NOTE — CARE PLAN
The patient is Stable - Low risk of patient condition declining or worsening    Shift Goals  Clinical Goals: Safety  Patient Goals: Rest, ambulate more    Progress made toward(s) clinical / shift goals:  Progressing      Problem: Pain - Standard  Goal: Alleviation of pain or a reduction in pain to the patient’s comfort goal  Outcome: Progressing     Problem: Knowledge Deficit - Standard  Goal: Patient and family/care givers will demonstrate understanding of plan of care, disease process/condition, diagnostic tests and medications  Outcome: Progressing     Problem: Fall Risk  Goal: Patient will remain free from falls  Outcome: Progressing

## 2022-07-16 NOTE — DISCHARGE INSTRUCTIONS
- Follow up with PCP in one week for medication reconciliation    - Follow up with neurosurgery within 2 weeks.          Discharge Instructions    Discharged to home by bus with friend. Discharged via walking, hospital escort: Yes.  Special equipment needed: Walker    Be sure to schedule a follow-up appointment with your primary care doctor or any specialists as instructed.     Discharge Plan:   Diet Plan: Discussed  Activity Level: Discussed  Confirmed Follow up Appointment: Patient to Call and Schedule Appointment  Confirmed Symptoms Management: Discussed  Medication Reconciliation Updated: Yes    I understand that a diet low in cholesterol, fat, and sodium is recommended for good health. Unless I have been given specific instructions below for another diet, I accept this instruction as my diet prescription.   Other diet: Regular    Special Instructions: None    -Is this patient being discharged with medication to prevent blood clots?  No    Is patient discharged on Warfarin / Coumadin?   No   Normal-Pressure Hydrocephalus  Normal pressure hydrocephalus (NPH) is a buildup of fluid (cerebrospinal fluid, CSF) inside the brain. This may or may not increase the pressure inside the brain (intracranial pressure). CSF is normally present in the brain, but when too much CSF increases pressure on the brain, it can affect brain function.  NPH usually occurs in people who are older than age 60. Many symptoms of NPH are also associated with aging or certain medical conditions. It is important to pay attention to changes in behavior and mental function.  What are the causes?  This condition may be caused by anything that blocks the flow of CSF, such as:  Head injury.  Infections.  Brain surgery.  Bleeding from a blood vessel in the brain.  Tumors or cancer.  In many cases, the cause of this condition is not known.  What are the signs or symptoms?  Symptoms of this condition may include:  Difficulty walking, such as:  Shuffling  "feet when walking.  Unsteadiness.  Problems when beginning to walk.  Feet feeling as though they are stuck or \"frozen\" to the floor.  Problems with bowel and bladder control.  Memory problems, such as:  Forgetfulness.  Lack of concentration.  Mood problems.  Confusion.  How is this diagnosed?  This condition is diagnosed based on:  Your medical history.  A physical exam. This can reveal walking (gait) changes or twitching or sudden muscle tightening (spasms) in the legs.  Other tests to confirm the diagnosis and identify the best options for treatment. These tests include:  Removal and examination of a small amount of CSF (lumbar puncture).  CT scan of your head.  MRI scan of your head.  How is this treated?    This condition may be treated with surgery in which a narrow tube (ventriculoperitoneal shunt, or  shunt) is placed in the brain to drain excess CSF. After a shunt is placed, you will be monitored to make sure CSF is draining properly. Depending on your age and overall health condition, you may not be a candidate for surgery.  If your symptoms are mild, your condition may be monitored on a regular basis before a decision is made about whether a shunt is needed.  Follow these instructions at home:  Medicines  Take over-the-counter and prescription medicines only as told by your health care provider.  Avoid taking medicines that can affect thinking, such as pain or sleeping medicines.  Lifestyle  Do not use any products that contain nicotine or tobacco, such as cigarettes and e-cigarettes. If you need help quitting, ask your health care provider.  Drink enough fluid to keep your urine clear or pale yellow.  If you have a shunt:  Do not wear tight-fitting hats or headgear.  Before having an MRI or abdominal surgery, tell your health care provider that you have a shunt.  Watch for signs of infection around the shunt, such as:  Fever.  Redness, pain, or swelling of the skin along the shunt path.  Headache or " stiff neck.  Nausea or vomiting.  General instructions  Work with your health care provider to determine what you need help with and what your safety needs are.  Ask your health care provider when you can resume your normal activities.  Keep all follow-up visits as told by your health care provider. This is important.  Contact a health care provider if:  You have any new symptoms.  Your symptoms get worse.  Your symptoms do not improve after surgery.  Get help right away if:  You have:  A fever or other signs of infection.  New or worsening confusion.  New or worsening sleepiness.  A hard time staying awake.  A headache that is getting worse.  You start to twitch or shake (seizure).  You lose coordination or balance.  Your or your family members become concerned for your safety.  Summary  Normal pressure hydrocephalus (NPH) is a buildup of fluid (cerebrospinal fluid, CSF) inside the brain. Too much CSF can put pressure on the brain and affect its function.  Anything that blocks the flow of CSF can cause this condition. In some cases, the cause of this condition is not known.  Depending on your age, overall health condition, and diagnostic tests, you may be a good candidate for a shunt to drain excess CSF.  This information is not intended to replace advice given to you by your health care provider. Make sure you discuss any questions you have with your health care provider.  Document Released: 05/04/2015 Document Revised: 05/07/2019 Document Reviewed: 01/18/2018  Elsevier Patient Education © 2020 [employer]

## 2022-07-17 NOTE — CONSULTS
DATE OF SERVICE:  07/16/2022     CHIEF COMPLAINT:  Difficulty with ambulation.     HISTORY OF PRESENT ILLNESS:  A 61-year-old gentleman who lives in a shelter   close by with either a brother or a friend of his, but he has had difficulty   with ambulation over the last 6 months.  His legs generally feel weak.  He is   falling backwards, hitting his head several times.  CT examination shows   dilated ventricles.  There was a consideration for normal pressure   hydrocephalus and after everything else turned out to be negative, he had a   high volume tap.  This helped with some of the gait and also with his urinary   incontinence.  He denies any memory deficits.     REVIEW OF SYSTEMS:  Pertinent for closed head injury in the past, alcohol use,   headaches, but no chest pain, chest pressure.  Cardiac issues.  Not   anticoagulated.     MEDICATIONS: Hypothyroid meds in the past, but not currently.     PAST SURGICAL HISTORY:  Denied.     CURRENT MEDICATIONS:  Denied.     PHYSICAL EXAMINATION:  VITAL SIGNS:  Blood pressure is 136/84, pulse 91, temperature 36.1.  HEENT:  Normocephalic and atraumatic.  Somewhat disheveled.  Pupils equal,   round, reactive to light.  Extraocular motor functions intact.  CARDIOVASCULAR:  Regular rate and rhythm.  RESPIRATORY:  Lungs are clear.  No labored breaths.  MUSCULOSKELETAL:  No swelling or deformity.  NEUROLOGIC:  Alert and oriented x 3.  Cranial nerves II-XII are intact.    Speech is fluent.  He walks with a shuffling gait.  No hyperreflexia.     DIAGNOSTIC DATA:  MRI examination from 07/12/2022, moderate to severe   dilatation of the lateral and third ventricles with prominent sylvian   fissures.  Findings are significant for normal pressure hydrocephalus.  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     IMPRESSION:  I think this gentleman meets the criteria for normal pressure   hydrocephalus.  Although he relates no  change in his memory.  He has had   difficulty with walking, and difficulty with urination and otherwise being   incontinent.  I suspect that there is some memory deficits, but these may not   come to the forefront his current situation.     He gets around by bus.  He lives in a shelter close by.     I think he has normal pressure hydrocephalus and probably would respond better   to a shunt as opposed to medication.  I have discussed the indications and   possible complications and if he would like to proceed I have given him my   telephone number and my office address.  We can get him in, discussed   placement of a ventriculoperitoneal shunt and I think this probably will help   him.     He is given a careful consideration.  Hopefully, he will follow up with us as   noted.        ______________________________  MD PRUDENCE MACHADO/HIWOT    DD:  07/16/2022 15:55  DT:  07/16/2022 20:08    Job#:  [id number]

## 2022-07-17 NOTE — DISCHARGE SUMMARY
[facility] Discharge Summary    Attending: Say Massey  Senior Resident: Dr. Leon  Intern:  Dr. Muñoz  Contact Number: [phone]    CHIEF COMPLAINT ON ADMISSION  Chief Complaint   Patient presents with   • Fall     BIBA from [facility]. [facility] employees found Pt on the ground in a [id number]. [facility] employees removed Pt and called EMS. Pt does not recall event and Pt had large bowel movement in his pants. Pt is alert to himself, date, and location. Does not recall event.       Reason for Admission  Sepsis (HCC)     Admission Date  7/10/2022    CODE STATUS  Prior    HPI & HOSPITAL COURSE  This is a 61 y.o. male w/ pmhx of hyperhyroidism (previously treated with methimazole) BIBA after found down at [facility].     #Sepsis   #PNA  #lactic acidosis   - 2/4 SIRS criteria with CXR w/ increased interstitial prominence suggesting atypical pneumonia   - received 30ml/kg IVF resus + CAP coverage with Azithromycin + C3 x 5 days   - resolved without complications     #Syncope-  - Suspected vasovagal vs orthostatic.   - EEG unremarkable for seizures. Monitor on telemetry overnight without arrhythmia, Echo done with normal LVEF, without any wall motion abnormalities or valvular pathology   - see A&P for Sepsis as above     #Normal pressure hydrocephalus-   -CT head without contrast: No acute intracranial findings, no acute hemorrhage, encephalomalacia with ex vacuo  - MRI with moderate to severe dilatation of the lateral and third ventricles, increased from previous CT scan 5/2/22, with prominent sylvian cisterns compared with frontoparietal sulci, also with acute angulation of bilateral frontal horns. Finding suspicious for NPH  - patient does demonstrate wobbling gait, and reports urine incontinence   - high volume LP done by IR with 25ml withdrewal, opening pressure 28mmhg H2O. Patient reports improvement in urinary incontinence after the procedure.   - Dr. Erwin Sawin consulted and made  arrangement to follow up in his office for consideration of shunt. Patient's friend says that he will help with transportation     #Hyperthyroidism-   - reported that he received methimazole while in [place], attempts to retrieve records unsuccessful. Patient reports that he has not taken any medications for 5-6 months.   - Remains asymptomatic, does not need treatment at this time  - TSH <0.005, fT4- 1.35, anti-thyroglobulin negative, anti-TPO 10.7      Therefore, he is discharged in fair and stable condition to home with close outpatient follow-up.    The patient met 2-midnight criteria for an inpatient stay at the time of discharge.    Discharge Date  7/16/2022    Physical Exam on Day of Discharge  Constitutional:       General: He is not in acute distress.     Appearance: He is normal weight.   HENT:      Head: Normocephalic and atraumatic.      Mouth/Throat:      Mouth: Mucous membranes are moist.      Pharynx: Oropharynx is clear.   Eyes:      Extraocular Movements: Extraocular movements intact.      Pupils: Pupils are equal, round, and reactive to light.   Cardiovascular:      Rate and Rhythm: Normal rate and regular rhythm.      Pulses: Normal pulses.      Heart sounds: Normal heart sounds, S1 normal and S2 normal.   Pulmonary:      Effort: Pulmonary effort is normal.      Breath sounds: Wheezing present.   Abdominal:      General: Abdomen is flat. Bowel sounds are normal.      Palpations: Abdomen is soft.      Tenderness: There is abdominal tenderness (in RUQ; palpable, smooth liver edge 2cm below costal margin).   Genitourinary:     Penis: Normal.       Testes: Normal.   Musculoskeletal:         General: Normal range of motion.      Cervical back: Normal range of motion.   Skin:     General: Skin is warm and dry.      Capillary Refill: Capillary refill takes less than 2 seconds.   Neurological:      General: No focal deficit present.      Mental Status: He is alert and oriented to person, place, and time.    Psychiatric:         Mood and Affect: Mood normal.         Thought Content: Thought content normal. Thought content does not include homicidal or suicidal ideation.     FOLLOW UP ITEMS POST DISCHARGE  - Follow up with Dr. Erwin Swain for consideration of shunt for NPH   - Follow up with PCP for referral to Endocrinology for thyroid follow up     DISCHARGE DIAGNOSES  Principal Problem:    Syncope and collapse POA: Yes  Active Problems:    Encephalomalacia POA: Yes    Atypical pneumonia POA: Yes    History of hyperthyroidism POA: Yes    Pleuritic chest pain POA: Yes    Elevated troponin POA: Yes    Normal pressure hydrocephalus (HCC) POA: Unknown    Ataxic gait POA: Unknown  Resolved Problems:    Sepsis (HCC) POA: Yes    Lactic acidosis- resolved POA: Yes    Acute hypoxemic respiratory failure (HCC) POA: Yes      FOLLOW UP    Erwin Swain M.D.  [address]  [place] [address]  [phone]    Schedule an appointment as soon as possible for a visit in 2 week(s)      Pcp Pt States None            MEDICATIONS ON DISCHARGE     Medication List      START taking these medications      Instructions   albuterol 108 (90 Base) MCG/ACT Aers inhalation aerosol   Inhale 2 Puffs every 6 hours as needed for Shortness of Breath.  Dose: 2 Puff     vitamin B-12 CR 1000 MCG Tbcr tablet  Commonly known as: CYANOCOBALAMIN   Take 1 tablet by mouth every day.  Dose: 1,000 mcg            Allergies  No Known Allergies    DIET  No orders of the defined types were placed in this encounter.      ACTIVITY  As tolerated.  Weight bearing as tolerated    CONSULTATIONS  Neurosurgery     PROCEDURES  Lumbar Puncture by IR     LABORATORY  Lab Results   Component Value Date    SODIUM 135 07/15/2022    POTASSIUM 4.6 07/15/2022    CHLORIDE 99 07/15/2022    CO2 25 07/15/2022    GLUCOSE 102 (H) 07/15/2022    BUN 8 07/15/2022    CREATININE 0.60 07/15/2022        Lab Results   Component Value Date    WBC 7.4 07/12/2022    HEMOGLOBIN 16.2 07/12/2022     HEMATOCRIT 46.9 07/12/2022    PLATELETCT 188 07/12/2022        Total time of the discharge process 30 minutes.

## 2022-07-22 ENCOUNTER — APPOINTMENT (OUTPATIENT)
Dept: RADIOLOGY | Facility: MEDICAL CENTER | Age: 62
DRG: 871 | End: 2022-07-22
Attending: STUDENT IN AN ORGANIZED HEALTH CARE EDUCATION/TRAINING PROGRAM
Payer: MEDICAID

## 2022-07-22 ENCOUNTER — HOSPITAL ENCOUNTER (INPATIENT)
Facility: MEDICAL CENTER | Age: 62
LOS: 1 days | DRG: 871 | End: 2022-07-24
Attending: STUDENT IN AN ORGANIZED HEALTH CARE EDUCATION/TRAINING PROGRAM | Admitting: STUDENT IN AN ORGANIZED HEALTH CARE EDUCATION/TRAINING PROGRAM
Payer: MEDICAID

## 2022-07-22 DIAGNOSIS — J18.9 PNEUMONIA OF BOTH LUNGS DUE TO INFECTIOUS ORGANISM, UNSPECIFIED PART OF LUNG: ICD-10-CM

## 2022-07-22 DIAGNOSIS — R41.82 ALTERED MENTAL STATUS, UNSPECIFIED ALTERED MENTAL STATUS TYPE: ICD-10-CM

## 2022-07-22 DIAGNOSIS — A41.9 SEPSIS, DUE TO UNSPECIFIED ORGANISM, UNSPECIFIED WHETHER ACUTE ORGAN DYSFUNCTION PRESENT (HCC): ICD-10-CM

## 2022-07-22 DIAGNOSIS — J18.9 ATYPICAL PNEUMONIA: ICD-10-CM

## 2022-07-22 LAB
ALBUMIN SERPL BCP-MCNC: 3.8 G/DL (ref 3.2–4.9)
ALBUMIN/GLOB SERPL: 1.2 G/DL
ALP SERPL-CCNC: 112 U/L (ref 30–99)
ALT SERPL-CCNC: 16 U/L (ref 2–50)
AMMONIA PLAS-SCNC: 27 UMOL/L (ref 11–45)
ANION GAP SERPL CALC-SCNC: 14 MMOL/L (ref 7–16)
APAP SERPL-MCNC: 14 UG/ML (ref 10–30)
AST SERPL-CCNC: 19 U/L (ref 12–45)
BASOPHILS # BLD AUTO: 0.3 % (ref 0–1.8)
BASOPHILS # BLD: 0.06 K/UL (ref 0–0.12)
BILIRUB SERPL-MCNC: 0.6 MG/DL (ref 0.1–1.5)
BUN SERPL-MCNC: 6 MG/DL (ref 8–22)
CALCIUM SERPL-MCNC: 8.8 MG/DL (ref 8.5–10.5)
CHLORIDE SERPL-SCNC: 97 MMOL/L (ref 96–112)
CO2 SERPL-SCNC: 20 MMOL/L (ref 20–33)
CREAT SERPL-MCNC: 0.56 MG/DL (ref 0.5–1.4)
EOSINOPHIL # BLD AUTO: 0.07 K/UL (ref 0–0.51)
EOSINOPHIL NFR BLD: 0.4 % (ref 0–6.9)
ERYTHROCYTE [DISTWIDTH] IN BLOOD BY AUTOMATED COUNT: 43.9 FL (ref 35.9–50)
ETHANOL BLD-MCNC: 89.8 MG/DL
FLUAV RNA SPEC QL NAA+PROBE: NEGATIVE
FLUBV RNA SPEC QL NAA+PROBE: NEGATIVE
GFR SERPLBLD CREATININE-BSD FMLA CKD-EPI: 113 ML/MIN/1.73 M 2
GLOBULIN SER CALC-MCNC: 3.1 G/DL (ref 1.9–3.5)
GLUCOSE SERPL-MCNC: 89 MG/DL (ref 65–99)
HCT VFR BLD AUTO: 43.4 % (ref 42–52)
HGB BLD-MCNC: 15.8 G/DL (ref 14–18)
IMM GRANULOCYTES # BLD AUTO: 0.1 K/UL (ref 0–0.11)
IMM GRANULOCYTES NFR BLD AUTO: 0.5 % (ref 0–0.9)
LACTATE SERPL-SCNC: 1.9 MMOL/L (ref 0.5–2)
LACTATE SERPL-SCNC: 2.5 MMOL/L (ref 0.5–2)
LYMPHOCYTES # BLD AUTO: 0.95 K/UL (ref 1–4.8)
LYMPHOCYTES NFR BLD: 5.2 % (ref 22–41)
MCH RBC QN AUTO: 34.8 PG (ref 27–33)
MCHC RBC AUTO-ENTMCNC: 36.4 G/DL (ref 33.7–35.3)
MCV RBC AUTO: 95.6 FL (ref 81.4–97.8)
MONOCYTES # BLD AUTO: 1.05 K/UL (ref 0–0.85)
MONOCYTES NFR BLD AUTO: 5.7 % (ref 0–13.4)
NEUTROPHILS # BLD AUTO: 16.14 K/UL (ref 1.82–7.42)
NEUTROPHILS NFR BLD: 87.9 % (ref 44–72)
NRBC # BLD AUTO: 0 K/UL
NRBC BLD-RTO: 0 /100 WBC
PLATELET # BLD AUTO: 377 K/UL (ref 164–446)
PMV BLD AUTO: 8.6 FL (ref 9–12.9)
POTASSIUM SERPL-SCNC: 3.6 MMOL/L (ref 3.6–5.5)
PROT SERPL-MCNC: 6.9 G/DL (ref 6–8.2)
RBC # BLD AUTO: 4.54 M/UL (ref 4.7–6.1)
RSV RNA SPEC QL NAA+PROBE: NEGATIVE
SALICYLATES SERPL-MCNC: 1 MG/DL (ref 15–25)
SARS-COV-2 RNA RESP QL NAA+PROBE: NOTDETECTED
SODIUM SERPL-SCNC: 131 MMOL/L (ref 135–145)
SPECIMEN SOURCE: NORMAL
WBC # BLD AUTO: 18.4 K/UL (ref 4.8–10.8)

## 2022-07-22 PROCEDURE — A9270 NON-COVERED ITEM OR SERVICE: HCPCS | Performed by: STUDENT IN AN ORGANIZED HEALTH CARE EDUCATION/TRAINING PROGRAM

## 2022-07-22 PROCEDURE — 84439 ASSAY OF FREE THYROXINE: CPT

## 2022-07-22 PROCEDURE — 72100 X-RAY EXAM L-S SPINE 2/3 VWS: CPT

## 2022-07-22 PROCEDURE — 85610 PROTHROMBIN TIME: CPT

## 2022-07-22 PROCEDURE — 36415 COLL VENOUS BLD VENIPUNCTURE: CPT

## 2022-07-22 PROCEDURE — 80143 DRUG ASSAY ACETAMINOPHEN: CPT

## 2022-07-22 PROCEDURE — 96375 TX/PRO/DX INJ NEW DRUG ADDON: CPT

## 2022-07-22 PROCEDURE — 84145 PROCALCITONIN (PCT): CPT

## 2022-07-22 PROCEDURE — 83605 ASSAY OF LACTIC ACID: CPT

## 2022-07-22 PROCEDURE — 700111 HCHG RX REV CODE 636 W/ 250 OVERRIDE (IP): Performed by: STUDENT IN AN ORGANIZED HEALTH CARE EDUCATION/TRAINING PROGRAM

## 2022-07-22 PROCEDURE — 700102 HCHG RX REV CODE 250 W/ 637 OVERRIDE(OP): Performed by: STUDENT IN AN ORGANIZED HEALTH CARE EDUCATION/TRAINING PROGRAM

## 2022-07-22 PROCEDURE — 84481 FREE ASSAY (FT-3): CPT

## 2022-07-22 PROCEDURE — 87040 BLOOD CULTURE FOR BACTERIA: CPT | Mod: 91

## 2022-07-22 PROCEDURE — 85025 COMPLETE CBC W/AUTO DIFF WBC: CPT

## 2022-07-22 PROCEDURE — 82077 ASSAY SPEC XCP UR&BREATH IA: CPT

## 2022-07-22 PROCEDURE — 71045 X-RAY EXAM CHEST 1 VIEW: CPT

## 2022-07-22 PROCEDURE — C9803 HOPD COVID-19 SPEC COLLECT: HCPCS | Performed by: STUDENT IN AN ORGANIZED HEALTH CARE EDUCATION/TRAINING PROGRAM

## 2022-07-22 PROCEDURE — 0241U HCHG SARS-COV-2 COVID-19 NFCT DS RESP RNA 4 TRGT MIC: CPT

## 2022-07-22 PROCEDURE — 99285 EMERGENCY DEPT VISIT HI MDM: CPT

## 2022-07-22 PROCEDURE — 82140 ASSAY OF AMMONIA: CPT

## 2022-07-22 PROCEDURE — 80179 DRUG ASSAY SALICYLATE: CPT

## 2022-07-22 PROCEDURE — 700105 HCHG RX REV CODE 258: Performed by: STUDENT IN AN ORGANIZED HEALTH CARE EDUCATION/TRAINING PROGRAM

## 2022-07-22 PROCEDURE — 72070 X-RAY EXAM THORAC SPINE 2VWS: CPT

## 2022-07-22 PROCEDURE — 80053 COMPREHEN METABOLIC PANEL: CPT

## 2022-07-22 PROCEDURE — 84443 ASSAY THYROID STIM HORMONE: CPT

## 2022-07-22 RX ORDER — ACETAMINOPHEN 500 MG
1000 TABLET ORAL ONCE
Status: COMPLETED | OUTPATIENT
Start: 2022-07-22 | End: 2022-07-22

## 2022-07-22 RX ORDER — SODIUM CHLORIDE 9 MG/ML
30 INJECTION, SOLUTION INTRAVENOUS ONCE
Status: COMPLETED | OUTPATIENT
Start: 2022-07-22 | End: 2022-07-23

## 2022-07-22 RX ORDER — SODIUM CHLORIDE 9 MG/ML
1000 INJECTION, SOLUTION INTRAVENOUS ONCE
Status: DISCONTINUED | OUTPATIENT
Start: 2022-07-22 | End: 2022-07-22

## 2022-07-22 RX ADMIN — CEFTRIAXONE SODIUM 1 G: 10 INJECTION, POWDER, FOR SOLUTION INTRAVENOUS at 22:05

## 2022-07-22 RX ADMIN — SODIUM CHLORIDE 2259 ML: 9 INJECTION, SOLUTION INTRAVENOUS at 21:55

## 2022-07-22 RX ADMIN — ACETAMINOPHEN 1000 MG: 500 TABLET ORAL at 22:10

## 2022-07-22 ASSESSMENT — FIBROSIS 4 INDEX: FIB4 SCORE: 1.85

## 2022-07-23 PROBLEM — A41.9 SEPSIS (HCC): Status: ACTIVE | Noted: 2022-07-23

## 2022-07-23 PROBLEM — D72.829 LEUKOCYTOSIS: Status: ACTIVE | Noted: 2022-07-23

## 2022-07-23 PROBLEM — F10.920 ALCOHOLIC INTOXICATION WITHOUT COMPLICATION (HCC): Status: ACTIVE | Noted: 2022-07-23

## 2022-07-23 LAB
AMPHET UR QL SCN: NEGATIVE
ANION GAP SERPL CALC-SCNC: 8 MMOL/L (ref 7–16)
APPEARANCE UR: CLEAR
BARBITURATES UR QL SCN: NEGATIVE
BASOPHILS # BLD AUTO: 0.2 % (ref 0–1.8)
BASOPHILS # BLD: 0.04 K/UL (ref 0–0.12)
BENZODIAZ UR QL SCN: NEGATIVE
BILIRUB UR QL STRIP.AUTO: NEGATIVE
BUN SERPL-MCNC: 6 MG/DL (ref 8–22)
BZE UR QL SCN: NEGATIVE
CALCIUM SERPL-MCNC: 8 MG/DL (ref 8.5–10.5)
CANNABINOIDS UR QL SCN: POSITIVE
CHLORIDE SERPL-SCNC: 103 MMOL/L (ref 96–112)
CO2 SERPL-SCNC: 24 MMOL/L (ref 20–33)
COLOR UR: YELLOW
CREAT SERPL-MCNC: 0.45 MG/DL (ref 0.5–1.4)
EOSINOPHIL # BLD AUTO: 0.01 K/UL (ref 0–0.51)
EOSINOPHIL NFR BLD: 0.1 % (ref 0–6.9)
ERYTHROCYTE [DISTWIDTH] IN BLOOD BY AUTOMATED COUNT: 45 FL (ref 35.9–50)
GFR SERPLBLD CREATININE-BSD FMLA CKD-EPI: 121 ML/MIN/1.73 M 2
GLUCOSE SERPL-MCNC: 115 MG/DL (ref 65–99)
GLUCOSE UR STRIP.AUTO-MCNC: NEGATIVE MG/DL
HCT VFR BLD AUTO: 40.6 % (ref 42–52)
HGB BLD-MCNC: 14.1 G/DL (ref 14–18)
IMM GRANULOCYTES # BLD AUTO: 0.12 K/UL (ref 0–0.11)
IMM GRANULOCYTES NFR BLD AUTO: 0.7 % (ref 0–0.9)
INR PPP: 1.13 (ref 0.87–1.13)
KETONES UR STRIP.AUTO-MCNC: NEGATIVE MG/DL
LEUKOCYTE ESTERASE UR QL STRIP.AUTO: NEGATIVE
LYMPHOCYTES # BLD AUTO: 0.98 K/UL (ref 1–4.8)
LYMPHOCYTES NFR BLD: 5.8 % (ref 22–41)
MCH RBC QN AUTO: 34.3 PG (ref 27–33)
MCHC RBC AUTO-ENTMCNC: 34.7 G/DL (ref 33.7–35.3)
MCV RBC AUTO: 98.8 FL (ref 81.4–97.8)
METHADONE UR QL SCN: NEGATIVE
MICRO URNS: NORMAL
MONOCYTES # BLD AUTO: 0.51 K/UL (ref 0–0.85)
MONOCYTES NFR BLD AUTO: 3 % (ref 0–13.4)
NEUTROPHILS # BLD AUTO: 15.22 K/UL (ref 1.82–7.42)
NEUTROPHILS NFR BLD: 90.2 % (ref 44–72)
NITRITE UR QL STRIP.AUTO: NEGATIVE
NRBC # BLD AUTO: 0 K/UL
NRBC BLD-RTO: 0 /100 WBC
OPIATES UR QL SCN: NEGATIVE
OXYCODONE UR QL SCN: NEGATIVE
PCP UR QL SCN: NEGATIVE
PH UR STRIP.AUTO: 5 [PH] (ref 5–8)
PLATELET # BLD AUTO: 317 K/UL (ref 164–446)
PMV BLD AUTO: 8.6 FL (ref 9–12.9)
POTASSIUM SERPL-SCNC: 3.4 MMOL/L (ref 3.6–5.5)
PROCALCITONIN SERPL-MCNC: <0.05 NG/ML
PROPOXYPH UR QL SCN: NEGATIVE
PROT UR QL STRIP: NEGATIVE MG/DL
PROTHROMBIN TIME: 14.4 SEC (ref 12–14.6)
RBC # BLD AUTO: 4.11 M/UL (ref 4.7–6.1)
RBC UR QL AUTO: NEGATIVE
SODIUM SERPL-SCNC: 135 MMOL/L (ref 135–145)
SP GR UR STRIP.AUTO: 1.01
T3FREE SERPL-MCNC: 3.69 PG/ML (ref 2–4.4)
T4 FREE SERPL-MCNC: 2.08 NG/DL (ref 0.93–1.7)
TSH SERPL DL<=0.005 MIU/L-ACNC: <0.005 UIU/ML (ref 0.38–5.33)
UROBILINOGEN UR STRIP.AUTO-MCNC: 0.2 MG/DL
WBC # BLD AUTO: 16.9 K/UL (ref 4.8–10.8)

## 2022-07-23 PROCEDURE — 700111 HCHG RX REV CODE 636 W/ 250 OVERRIDE (IP): Performed by: STUDENT IN AN ORGANIZED HEALTH CARE EDUCATION/TRAINING PROGRAM

## 2022-07-23 PROCEDURE — 85025 COMPLETE CBC W/AUTO DIFF WBC: CPT

## 2022-07-23 PROCEDURE — 70486 CT MAXILLOFACIAL W/O DYE: CPT

## 2022-07-23 PROCEDURE — HZ2ZZZZ DETOXIFICATION SERVICES FOR SUBSTANCE ABUSE TREATMENT: ICD-10-PCS | Performed by: STUDENT IN AN ORGANIZED HEALTH CARE EDUCATION/TRAINING PROGRAM

## 2022-07-23 PROCEDURE — 81003 URINALYSIS AUTO W/O SCOPE: CPT

## 2022-07-23 PROCEDURE — 700102 HCHG RX REV CODE 250 W/ 637 OVERRIDE(OP): Performed by: STUDENT IN AN ORGANIZED HEALTH CARE EDUCATION/TRAINING PROGRAM

## 2022-07-23 PROCEDURE — 700105 HCHG RX REV CODE 258: Performed by: STUDENT IN AN ORGANIZED HEALTH CARE EDUCATION/TRAINING PROGRAM

## 2022-07-23 PROCEDURE — 92610 EVALUATE SWALLOWING FUNCTION: CPT

## 2022-07-23 PROCEDURE — 700101 HCHG RX REV CODE 250: Performed by: STUDENT IN AN ORGANIZED HEALTH CARE EDUCATION/TRAINING PROGRAM

## 2022-07-23 PROCEDURE — 96365 THER/PROPH/DIAG IV INF INIT: CPT

## 2022-07-23 PROCEDURE — 770020 HCHG ROOM/CARE - TELE (206)

## 2022-07-23 PROCEDURE — 72125 CT NECK SPINE W/O DYE: CPT

## 2022-07-23 PROCEDURE — 36415 COLL VENOUS BLD VENIPUNCTURE: CPT

## 2022-07-23 PROCEDURE — A9270 NON-COVERED ITEM OR SERVICE: HCPCS | Performed by: STUDENT IN AN ORGANIZED HEALTH CARE EDUCATION/TRAINING PROGRAM

## 2022-07-23 PROCEDURE — 87086 URINE CULTURE/COLONY COUNT: CPT

## 2022-07-23 PROCEDURE — 96372 THER/PROPH/DIAG INJ SC/IM: CPT

## 2022-07-23 PROCEDURE — 99223 1ST HOSP IP/OBS HIGH 75: CPT | Performed by: STUDENT IN AN ORGANIZED HEALTH CARE EDUCATION/TRAINING PROGRAM

## 2022-07-23 PROCEDURE — 80048 BASIC METABOLIC PNL TOTAL CA: CPT

## 2022-07-23 PROCEDURE — 80307 DRUG TEST PRSMV CHEM ANLYZR: CPT

## 2022-07-23 PROCEDURE — 70450 CT HEAD/BRAIN W/O DYE: CPT

## 2022-07-23 RX ORDER — LORAZEPAM 1 MG/1
1 TABLET ORAL EVERY 4 HOURS PRN
Status: DISCONTINUED | OUTPATIENT
Start: 2022-07-23 | End: 2022-07-24 | Stop reason: HOSPADM

## 2022-07-23 RX ORDER — SODIUM CHLORIDE, SODIUM LACTATE, POTASSIUM CHLORIDE, CALCIUM CHLORIDE 600; 310; 30; 20 MG/100ML; MG/100ML; MG/100ML; MG/100ML
INJECTION, SOLUTION INTRAVENOUS CONTINUOUS
Status: DISCONTINUED | OUTPATIENT
Start: 2022-07-23 | End: 2022-07-24 | Stop reason: HOSPADM

## 2022-07-23 RX ORDER — LORAZEPAM 0.5 MG/1
0.5 TABLET ORAL EVERY 4 HOURS PRN
Status: DISCONTINUED | OUTPATIENT
Start: 2022-07-23 | End: 2022-07-24 | Stop reason: HOSPADM

## 2022-07-23 RX ORDER — AZITHROMYCIN 250 MG/1
500 TABLET, FILM COATED ORAL DAILY
Status: DISCONTINUED | OUTPATIENT
Start: 2022-07-24 | End: 2022-07-24 | Stop reason: HOSPADM

## 2022-07-23 RX ORDER — AZITHROMYCIN 500 MG/5ML
500 INJECTION, POWDER, LYOPHILIZED, FOR SOLUTION INTRAVENOUS EVERY 24 HOURS
Status: DISCONTINUED | OUTPATIENT
Start: 2022-07-23 | End: 2022-07-23

## 2022-07-23 RX ORDER — LORAZEPAM 2 MG/ML
2 INJECTION INTRAMUSCULAR
Status: DISCONTINUED | OUTPATIENT
Start: 2022-07-23 | End: 2022-07-24 | Stop reason: HOSPADM

## 2022-07-23 RX ORDER — ONDANSETRON 2 MG/ML
4 INJECTION INTRAMUSCULAR; INTRAVENOUS EVERY 4 HOURS PRN
Status: DISCONTINUED | OUTPATIENT
Start: 2022-07-23 | End: 2022-07-24 | Stop reason: HOSPADM

## 2022-07-23 RX ORDER — BISACODYL 10 MG
10 SUPPOSITORY, RECTAL RECTAL
Status: DISCONTINUED | OUTPATIENT
Start: 2022-07-23 | End: 2022-07-24 | Stop reason: HOSPADM

## 2022-07-23 RX ORDER — GAUZE BANDAGE 2" X 2"
100 BANDAGE TOPICAL DAILY
Status: DISCONTINUED | OUTPATIENT
Start: 2022-07-24 | End: 2022-07-23

## 2022-07-23 RX ORDER — PROCHLORPERAZINE EDISYLATE 5 MG/ML
5-10 INJECTION INTRAMUSCULAR; INTRAVENOUS EVERY 4 HOURS PRN
Status: DISCONTINUED | OUTPATIENT
Start: 2022-07-23 | End: 2022-07-24 | Stop reason: HOSPADM

## 2022-07-23 RX ORDER — LORAZEPAM 2 MG/1
2 TABLET ORAL
Status: DISCONTINUED | OUTPATIENT
Start: 2022-07-23 | End: 2022-07-24 | Stop reason: HOSPADM

## 2022-07-23 RX ORDER — GUAIFENESIN/DEXTROMETHORPHAN 100-10MG/5
10 SYRUP ORAL EVERY 6 HOURS PRN
Status: DISCONTINUED | OUTPATIENT
Start: 2022-07-23 | End: 2022-07-24 | Stop reason: HOSPADM

## 2022-07-23 RX ORDER — ENOXAPARIN SODIUM 100 MG/ML
40 INJECTION SUBCUTANEOUS DAILY
Status: DISCONTINUED | OUTPATIENT
Start: 2022-07-23 | End: 2022-07-24 | Stop reason: HOSPADM

## 2022-07-23 RX ORDER — AMOXICILLIN 250 MG
2 CAPSULE ORAL 2 TIMES DAILY
Status: DISCONTINUED | OUTPATIENT
Start: 2022-07-23 | End: 2022-07-24

## 2022-07-23 RX ORDER — HYDRALAZINE HYDROCHLORIDE 20 MG/ML
10 INJECTION INTRAMUSCULAR; INTRAVENOUS EVERY 4 HOURS PRN
Status: DISCONTINUED | OUTPATIENT
Start: 2022-07-23 | End: 2022-07-24 | Stop reason: HOSPADM

## 2022-07-23 RX ORDER — ACETAMINOPHEN 500 MG
1000 TABLET ORAL 3 TIMES DAILY
Status: DISCONTINUED | OUTPATIENT
Start: 2022-07-23 | End: 2022-07-24 | Stop reason: HOSPADM

## 2022-07-23 RX ORDER — PROMETHAZINE HYDROCHLORIDE 25 MG/1
12.5-25 TABLET ORAL EVERY 4 HOURS PRN
Status: DISCONTINUED | OUTPATIENT
Start: 2022-07-23 | End: 2022-07-24 | Stop reason: HOSPADM

## 2022-07-23 RX ORDER — PROMETHAZINE HYDROCHLORIDE 25 MG/1
12.5-25 SUPPOSITORY RECTAL EVERY 4 HOURS PRN
Status: DISCONTINUED | OUTPATIENT
Start: 2022-07-23 | End: 2022-07-24 | Stop reason: HOSPADM

## 2022-07-23 RX ORDER — ACETAMINOPHEN 325 MG/1
650 TABLET ORAL EVERY 6 HOURS PRN
Status: DISCONTINUED | OUTPATIENT
Start: 2022-07-23 | End: 2022-07-23

## 2022-07-23 RX ORDER — LORAZEPAM 2 MG/1
4 TABLET ORAL
Status: DISCONTINUED | OUTPATIENT
Start: 2022-07-23 | End: 2022-07-24 | Stop reason: HOSPADM

## 2022-07-23 RX ORDER — ONDANSETRON 4 MG/1
4 TABLET, ORALLY DISINTEGRATING ORAL EVERY 4 HOURS PRN
Status: DISCONTINUED | OUTPATIENT
Start: 2022-07-23 | End: 2022-07-24 | Stop reason: HOSPADM

## 2022-07-23 RX ORDER — POLYETHYLENE GLYCOL 3350 17 G/17G
1 POWDER, FOR SOLUTION ORAL
Status: DISCONTINUED | OUTPATIENT
Start: 2022-07-23 | End: 2022-07-24 | Stop reason: HOSPADM

## 2022-07-23 RX ORDER — FOLIC ACID 1 MG/1
1 TABLET ORAL DAILY
Status: DISCONTINUED | OUTPATIENT
Start: 2022-07-24 | End: 2022-07-24 | Stop reason: HOSPADM

## 2022-07-23 RX ADMIN — SODIUM CHLORIDE, POTASSIUM CHLORIDE, SODIUM LACTATE AND CALCIUM CHLORIDE: 600; 310; 30; 20 INJECTION, SOLUTION INTRAVENOUS at 18:42

## 2022-07-23 RX ADMIN — THIAMINE HYDROCHLORIDE 500 MG: 100 INJECTION, SOLUTION INTRAMUSCULAR; INTRAVENOUS at 14:02

## 2022-07-23 RX ADMIN — LORAZEPAM 0.5 MG: 0.5 TABLET ORAL at 21:01

## 2022-07-23 RX ADMIN — SODIUM CHLORIDE, POTASSIUM CHLORIDE, SODIUM LACTATE AND CALCIUM CHLORIDE: 600; 310; 30; 20 INJECTION, SOLUTION INTRAVENOUS at 11:03

## 2022-07-23 RX ADMIN — SODIUM CHLORIDE, POTASSIUM CHLORIDE, SODIUM LACTATE AND CALCIUM CHLORIDE: 600; 310; 30; 20 INJECTION, SOLUTION INTRAVENOUS at 02:52

## 2022-07-23 RX ADMIN — LORAZEPAM 0.5 MG: 0.5 TABLET ORAL at 17:23

## 2022-07-23 RX ADMIN — ENOXAPARIN SODIUM 40 MG: 40 INJECTION SUBCUTANEOUS at 02:01

## 2022-07-23 RX ADMIN — ENOXAPARIN SODIUM 40 MG: 40 INJECTION SUBCUTANEOUS at 17:16

## 2022-07-23 RX ADMIN — SODIUM CHLORIDE 3 G: 900 INJECTION INTRAVENOUS at 08:34

## 2022-07-23 RX ADMIN — THIAMINE HYDROCHLORIDE: 100 INJECTION, SOLUTION INTRAMUSCULAR; INTRAVENOUS at 03:28

## 2022-07-23 RX ADMIN — SODIUM CHLORIDE 3 G: 900 INJECTION INTRAVENOUS at 02:44

## 2022-07-23 RX ADMIN — ACETAMINOPHEN 1000 MG: 500 TABLET, FILM COATED ORAL at 08:35

## 2022-07-23 RX ADMIN — THIAMINE HYDROCHLORIDE 500 MG: 100 INJECTION, SOLUTION INTRAMUSCULAR; INTRAVENOUS at 23:18

## 2022-07-23 RX ADMIN — ACETAMINOPHEN 1000 MG: 500 TABLET, FILM COATED ORAL at 14:57

## 2022-07-23 RX ADMIN — AZITHROMYCIN MONOHYDRATE 500 MG: 500 INJECTION, POWDER, LYOPHILIZED, FOR SOLUTION INTRAVENOUS at 06:37

## 2022-07-23 RX ADMIN — ACETAMINOPHEN 1000 MG: 500 TABLET, FILM COATED ORAL at 20:50

## 2022-07-23 RX ADMIN — VANCOMYCIN HYDROCHLORIDE 1500 MG: 500 INJECTION, POWDER, LYOPHILIZED, FOR SOLUTION INTRAVENOUS at 00:40

## 2022-07-23 RX ADMIN — SODIUM CHLORIDE 3 G: 900 INJECTION INTRAVENOUS at 14:57

## 2022-07-23 RX ADMIN — SODIUM CHLORIDE 3 G: 900 INJECTION INTRAVENOUS at 20:52

## 2022-07-23 ASSESSMENT — LIFESTYLE VARIABLES
TOTAL SCORE: 0
ORIENTATION AND CLOUDING OF SENSORIUM: CANNOT DO SERIAL ADDITIONS OR IS UNCERTAIN ABOUT DATE
HAVE PEOPLE ANNOYED YOU BY CRITICIZING YOUR DRINKING: NO
SUBSTANCE_ABUSE: 1
TOTAL SCORE: 4
AGITATION: *
DOES PATIENT WANT TO STOP DRINKING: NO
HOW MANY TIMES IN THE PAST YEAR HAVE YOU HAD 5 OR MORE DRINKS IN A DAY: 0
CONSUMPTION TOTAL: NEGATIVE
ORIENTATION AND CLOUDING OF SENSORIUM: CANNOT DO SERIAL ADDITIONS OR IS UNCERTAIN ABOUT DATE
TREMOR: NO TREMOR
AUDITORY DISTURBANCES: NOT PRESENT
ON A TYPICAL DAY WHEN YOU DRINK ALCOHOL HOW MANY DRINKS DO YOU HAVE: 2
HEADACHE, FULLNESS IN HEAD: SEVERE
ANXIETY: NO ANXIETY (AT EASE)
AGITATION: SOMEWHAT MORE THAN NORMAL ACTIVITY
PAROXYSMAL SWEATS: NO SWEAT VISIBLE
ANXIETY: NO ANXIETY (AT EASE)
PAROXYSMAL SWEATS: NO SWEAT VISIBLE
EVER HAD A DRINK FIRST THING IN THE MORNING TO STEADY YOUR NERVES TO GET RID OF A HANGOVER: NO
VISUAL DISTURBANCES: NOT PRESENT
TOTAL SCORE: 0
NAUSEA AND VOMITING: NO NAUSEA AND NO VOMITING
TOTAL SCORE: 7
TOTAL SCORE: 6
TOTAL SCORE: 0
VISUAL DISTURBANCES: NOT PRESENT
ORIENTATION AND CLOUDING OF SENSORIUM: CANNOT DO SERIAL ADDITIONS OR IS UNCERTAIN ABOUT DATE
HAVE YOU EVER FELT YOU SHOULD CUT DOWN ON YOUR DRINKING: NO
NAUSEA AND VOMITING: NO NAUSEA AND NO VOMITING
AUDITORY DISTURBANCES: NOT PRESENT
TREMOR: NO TREMOR
AVERAGE NUMBER OF DAYS PER WEEK YOU HAVE A DRINK CONTAINING ALCOHOL: 2
HEADACHE, FULLNESS IN HEAD: MODERATE
VISUAL DISTURBANCES: NOT PRESENT
PAROXYSMAL SWEATS: NO SWEAT VISIBLE
HEADACHE, FULLNESS IN HEAD: MODERATE
AUDITORY DISTURBANCES: NOT PRESENT
ANXIETY: NO ANXIETY (AT EASE)
ALCOHOL_USE: YES
EVER FELT BAD OR GUILTY ABOUT YOUR DRINKING: NO
AGITATION: NORMAL ACTIVITY
NAUSEA AND VOMITING: NO NAUSEA AND NO VOMITING
TREMOR: NO TREMOR

## 2022-07-23 ASSESSMENT — COGNITIVE AND FUNCTIONAL STATUS - GENERAL
PERSONAL GROOMING: A LITTLE
WALKING IN HOSPITAL ROOM: A LOT
MOVING TO AND FROM BED TO CHAIR: A LITTLE
MOVING FROM LYING ON BACK TO SITTING ON SIDE OF FLAT BED: A LOT
STANDING UP FROM CHAIR USING ARMS: A LOT
HELP NEEDED FOR BATHING: A LITTLE
TOILETING: A LITTLE
MOBILITY SCORE: 15
SUGGESTED CMS G CODE MODIFIER DAILY ACTIVITY: CK
DRESSING REGULAR UPPER BODY CLOTHING: A LITTLE
DAILY ACTIVITIY SCORE: 19
CLIMB 3 TO 5 STEPS WITH RAILING: A LOT
SUGGESTED CMS G CODE MODIFIER MOBILITY: CK
DRESSING REGULAR LOWER BODY CLOTHING: A LITTLE

## 2022-07-23 ASSESSMENT — PATIENT HEALTH QUESTIONNAIRE - PHQ9
1. LITTLE INTEREST OR PLEASURE IN DOING THINGS: NOT AT ALL
2. FEELING DOWN, DEPRESSED, IRRITABLE, OR HOPELESS: NOT AT ALL
SUM OF ALL RESPONSES TO PHQ9 QUESTIONS 1 AND 2: 0

## 2022-07-23 ASSESSMENT — ENCOUNTER SYMPTOMS
FALLS: 1
MEMORY LOSS: 1
EYES NEGATIVE: 1
CARDIOVASCULAR NEGATIVE: 1
HEADACHES: 1
RESPIRATORY NEGATIVE: 1
GASTROINTESTINAL NEGATIVE: 1
NECK PAIN: 1

## 2022-07-23 ASSESSMENT — PAIN DESCRIPTION - PAIN TYPE
TYPE: ACUTE PAIN

## 2022-07-23 ASSESSMENT — FIBROSIS 4 INDEX: FIB4 SCORE: 0.74

## 2022-07-23 NOTE — THERAPY
"Speech Language Pathology   Clinical Swallow Evaluation     Patient Name: James Hurley  AGE:  61 y.o., SEX:  male  Medical Record #: [id number]  Today's Date: 7/23/2022     Precautions  Precautions: Fall Risk    HPI:  60 y/o admitted on 7/23 for a closed head injury and AMS. Not seen by SLP before at [facility]. CT of chest found \"No pulmonary embolus. No acute abnormality in the chest, Emphysema.\" CT of head found \"No acute intracranial abnormality is identified, there are nonspecific white matter changes, commonly associated with small vessel ischemic disease.\" Dx chest found \"Mild edema and/or infiltrates.\"    PMHx:  hypothyroidism and recent hospitalization      Level of Consciousness: Alert  Affect/Behavior: Appropriate  Follows Directives: Yes  Hearing: Functional hearing  Vision: Functional vision      Prior Living Situation & Level of Function:  Pt is homeless and currently residing in a shelter. He endorsed frequent falls and reported that he never \"gets worked up for my equilibrium\" while admitted in the hospital. He admits to having multiple falls when drinking alcohol. He denied any hx of dysphagia at baseline and reported that he smokes cigarettes.      Oral Mechanism Evaluation  Facial Symmetry: Equal  Labial Observations: WFL  Lingual Observations: Midline  Dentition: Good  Comments:      Voice  Quality: WFL  Resonance: WFL  Intensity: Appropriate  Cough: WFL  Comments:      Current Method of Nutrition   Oral diet (regular/thin liquids (was NPO this morning))         Assessment  Positioning: Napoles's (60-90 degrees)  Bolus Administration: Patient  Oxygen Requirements: 3 L Nasal Cannula  Factor(s) Affecting Performance: None    Swallowing Trials  Thin Liquid (TN0): WFL  Pureed (PU4): WFL  Soft & Bite Sized (SB6): WFL  Regular (RG7): WFL    Comments:  Cough x1 noted with trial of thin liquids and soft solid textures, but appears related to smokers cough vs penetration/aspiration event. Mastication and swallow " "trigger timely and pt able to feed self independently. Vocal quality was clear following the swallow. He denied any globus sensation.       Clinical Impressions  Pt is presenting with a grossly functional oropharyngeal swallow.       Recommendations  1.  Continuation of regular/thin liquid diet, recommend sitting up at 90* during meals  2.  Instrumentation: None indicated at this time  3.  Swallowing Instructions & Precautions:   Supervision: Independent  Positioning: Fully upright and midline during oral intake  Medication: Whole with liquid  Strategies: Small bites/sips  Oral Care: BID         Plan    Recommend Speech Therapy 3 times per week until therapy goals are met for the following treatments:  Dysphagia Training and Patient / Family / Caregiver Education.    Discharge Recommendations: Anticipate that the patient will have no further speech therapy needs after discharge from the hospital       Objective       07/23/22 1035   Precautions   Precautions Fall Risk   Vitals   O2 (LPM) 3   O2 Delivery Device Silicone Nasal Cannula   Pain 0 - 10 Group   Therapist Pain Assessment Post Activity Pain Same as Prior to Activity;Nurse Notified  (buttock pain)   Prior Living Situation   Housing / Facility Homeless   Lives with - Patient's Self Care Capacity Alone and Able to Care For Self   Prior Level Of Function   Communication Unknown   Swallow Within Functional Limits   Dentition Intact   Hearing Within Functional Limits for Evaluation   Vision Within Functional Limits for Evaluation   Patient's Primary Language English   Occupation (Pre-Hospital Vocational) Not Employed   Patient / Family Goals   Patient / Family Goal #1 \"to eat\"   Short Term Goals   Short Term Goal # 1 Pt will consume a regular/thin liquid diet with no overt s/sx of aspiration   Education Group   Education Provided Dysphagia   Dysphagia Patient Response Patient;Acceptance;Explanation;Verbal Demonstration;Reinforcement Needed   Problem List   Problem " List Dysphagia   Anticipated Discharge Needs   Discharge Recommendations Anticipate that the patient will have no further speech therapy needs after discharge from the hospital   Therapy Recommendations Upon DC Not Indicated   Interdisciplinary Plan of Care Collaboration   IDT Collaboration with  Nursing   Patient Position at End of Therapy Seated;In Bed

## 2022-07-23 NOTE — ASSESSMENT & PLAN NOTE
This is Sepsis Present on admission  SIRS criteria identified on my evaluation include: Tachycardia, with heart rate greater than 90 BPM, Tachypnea, with respirations greater than 20 per minute and Leukocytosis, with WBC greater than 12,000  Source is atypical pneumonia  Sepsis protocol initiated  Fluid resuscitation ordered per protocol  Crystalloid Fluid Administration: Fluid resuscitation ordered per standard protocol - 30 mL/kg per current or ideal body weight  IV antibiotics as appropriate for source of sepsis  Reassessment: I have reassessed the patient's hemodynamic status    Chest x-ray shows atypical pneumonia  IV antibiotics  Follow blood and sputum cultures  Pending procalcitonin

## 2022-07-23 NOTE — H&P
"Hospital Medicine History & Physical Note    Date of Service  7/23/2022    Primary Care Physician  Pcp Pt States None    Consultants  None    Code Status  Full Code    Chief Complaint  Chief Complaint   Patient presents with   • Closed Head Injury     Patient states \"I bumped my head.\" Patient denies falling but states \"It was a really big rock.\" -thinners    • Altered Mental Status     Patient alert and oriented to self only. Patient is not answering questions appropriately. Patient states \"I can't think right now.\" Patient denies drug or alcohol use.        History of Presenting Illness  61-year-old male with a past medical history of hypothyroidism, NPH and recent hospital discharge on 7/16/2022 for sepsis secondary to atypical pneumonia for which patient was discharged on p.o. antibiotics presents emergency department on 7/22/2022 with a dyspnea, cough and altered mental status after reported head collision.  On interview patient AAOX2 (Self and place).  Patient reports having a headache and thinks it is secondary to a fall but he is not sure. Denies abdominal pain, nausea, vomiting, dyspnea, cough or LOC.  Patient remembers that he was in the hospital not long ago but does not remember if he finished taking antibiotics.    At the emergency department, vital signs with 100.5 fever, tachycardia in the 130s, tachypnea in the 20s.  Patient saturating well room air.  CBC with leukocytosis at 18.4, no anemia.  Chemistry with hyponatremia, otherwise normal.  Lactic acid 2.5.  Viral panel negative.  Acetaminophen levels at 14.  Diagnostic alcohol 89.8, ammonia and salicylate level low.  UDS positive for cannabis.  Urinalysis without signs of infection.  Head CT with no acute intracranial normalities for patient noted for mild pansinusitis.  Chest x-ray with mild edema and or infiltrates.  Cervical, thoracic and lumbar x-rays without signs of fracture.  Blood samples were collected for culture and patient was started on " a vancomycin antibiotic regimen and received a 1 L bolus.  Patient was admitted for sepsis of unknown etiology which requires IV antibiotics and further work-up as well as alcohol intoxication.    I discussed the plan of care with patient and bedside RN.    Review of Systems  Review of Systems   Constitutional: Positive for malaise/fatigue.   HENT: Negative.    Eyes: Negative.    Respiratory: Negative.    Cardiovascular: Negative.    Gastrointestinal: Negative.    Genitourinary: Negative.    Musculoskeletal: Positive for falls and neck pain.   Skin: Negative.    Neurological: Positive for headaches.   Endo/Heme/Allergies: Negative.    Psychiatric/Behavioral: Positive for memory loss and substance abuse.     Past Medical History   has a past medical history of Hypothyroid.    Surgical History  Reviewed and nonpertinent    Family History  Family history reviewed with patient. There is no family history that is pertinent to the chief complaint.     Social History   reports that he has been smoking. He uses smokeless tobacco. He reports current alcohol use. He reports previous drug use.    Allergies  No Known Allergies    Medications  Prior to Admission Medications   Prescriptions Last Dose Informant Patient Reported? Taking?   Cyanocobalamin (B-12) 1000 MCG Tab CR   No No   Sig: Take 1 tablet by mouth every day.   albuterol 108 (90 Base) MCG/ACT Aero Soln inhalation aerosol   No No   Sig: Inhale 2 Puffs every 6 hours as needed for Shortness of Breath.      Facility-Administered Medications: None       Physical Exam  Temp:  [36.9 °C (98.5 °F)-38.1 °C (100.5 °F)] 36.9 °C (98.5 °F)  Pulse:  [] 96  Resp:  [14-29] 14  BP: (103-159)/() 120/76  SpO2:  [88 %-98 %] 98 %  Blood Pressure: 103/58   Temperature: 36.9 °C (98.5 °F)   Pulse: 95   Respiration: 14   Pulse Oximetry: 94 %       Physical Exam  Constitutional:       General: He is not in acute distress.     Appearance: Normal appearance. He is ill-appearing.       Comments: Poor hygiene   HENT:      Head: Normocephalic and atraumatic.      Nose: Nose normal. No congestion.      Mouth/Throat:      Mouth: Mucous membranes are dry.   Eyes:      Extraocular Movements: Extraocular movements intact.      Pupils: Pupils are equal, round, and reactive to light.   Cardiovascular:      Rate and Rhythm: Normal rate and regular rhythm.      Pulses: Normal pulses.      Heart sounds: Normal heart sounds.   Pulmonary:      Effort: Pulmonary effort is normal.      Breath sounds: Normal breath sounds.   Abdominal:      General: Bowel sounds are normal. There is no distension.      Palpations: Abdomen is soft.      Tenderness: There is no abdominal tenderness. There is no guarding or rebound.   Musculoskeletal:         General: No swelling. Normal range of motion.      Cervical back: Normal range of motion and neck supple.      Right lower leg: No edema.      Left lower leg: No edema.   Skin:     General: Skin is warm.      Coloration: Skin is not jaundiced.   Neurological:      Mental Status: He is alert. He is disoriented.      Cranial Nerves: No cranial nerve deficit.         Laboratory:  Recent Labs     07/22/22 2051   WBC 18.4*   RBC 4.54*   HEMOGLOBIN 15.8   HEMATOCRIT 43.4   MCV 95.6   MCH 34.8*   MCHC 36.4*   RDW 43.9   PLATELETCT 377   MPV 8.6*     Recent Labs     07/22/22 2051   SODIUM 131*   POTASSIUM 3.6   CHLORIDE 97   CO2 20   GLUCOSE 89   BUN 6*   CREATININE 0.56   CALCIUM 8.8     Recent Labs     07/22/22 2051   ALTSGPT 16   ASTSGOT 19   ALKPHOSPHAT 112*   TBILIRUBIN 0.6   GLUCOSE 89         No results for input(s): NTPROBNP in the last 72 hours.      No results for input(s): TROPONINT in the last 72 hours.    Imaging:  CT-HEAD W/O   Final Result         1.  No acute intracranial abnormality is identified, there are nonspecific white matter changes, commonly associated with small vessel ischemic disease.  Associated mild cerebral atrophy is noted.   2.  Mild pansinusitis  changes   3.  Atherosclerosis.         CT-CSPINE WITHOUT PLUS RECONS   Final Result         1.  Multilevel degenerative changes of the cervical spine limit diagnostic sensitivity of this examination, otherwise no acute traumatic bony injury of the cervical spine is apparent.   2.  Severe neural foraminal stenosis at C3/C4 on the left.   3.  Atherosclerosis      CT-MAXILLOFACIAL W/O   Final Result         1.  No acute traumatic facial bony injuries identified.   2.  Bilateral occlusion of ostiomeatal units   3.  Mild pansinusitis changes      DX-LUMBAR SPINE-2 OR 3 VIEWS   Final Result         1.  No acute traumatic bony injury of the lumbar spine.   2.  Atherosclerosis      DX-THORACIC SPINE-2 VIEWS   Final Result         1.  No acute traumatic bony injury of the thoracic spine.   2.  Atherosclerosis      DX-CHEST-PORTABLE (1 VIEW)   Final Result         1.  Mild edema and/or infiltrates.   2.  Atherosclerosis          Assessment/Plan:  Justification for Admission Status  I anticipate this patient will require at least two midnights for appropriate medical management, necessitating inpatient admission because of below    * Sepsis (HCC)- (present on admission)  Assessment & Plan  This is Sepsis Present on admission  SIRS criteria identified on my evaluation include: Tachycardia, with heart rate greater than 90 BPM, Tachypnea, with respirations greater than 20 per minute and Leukocytosis, with WBC greater than 12,000  Source is atypical pneumonia  Sepsis protocol initiated  Fluid resuscitation ordered per protocol  Crystalloid Fluid Administration: Fluid resuscitation ordered per standard protocol - 30 mL/kg per current or ideal body weight  IV antibiotics as appropriate for source of sepsis  Reassessment: I have reassessed the patient's hemodynamic status    Chest x-ray shows atypical pneumonia  IV antibiotics  Follow blood and sputum cultures  Pending procalcitonin    Acute metabolic encephalopathy- (present on  admission)  Assessment & Plan  Sepsis and alcohol intoxication contributing  Frequent neurochecks  Management for sepsis  Aggressive IV hydration  Labs in a.m.    History of hyperthyroidism- (present on admission)  Assessment & Plan  Reported history of  Off of methimazole for the past 5 to 6 months  7/16 admission labs: TSH <0.005, fT4 1.35, anti-thyroglobulin negative, anti-TPO 10.7  Repeat labs  Monitor    Atypical pneumonia- (present on admission)  Assessment & Plan  Chest x-ray with interstitial infiltrates in setting of sepsis  Empiric IV antibiotics  Follow sputum and blood cultures  Pending procalcitonin    Alcoholic intoxication without complication (HCC)- (present on admission)  Assessment & Plan  Blood alcohol level at 89  Rally bag  IV hydration.    Leukocytosis- (present on admission)  Assessment & Plan  Secondary sepsis  IV antibiotics  Trend      VTE prophylaxis: enoxaparin ppx

## 2022-07-23 NOTE — ASSESSMENT & PLAN NOTE
Chest x-ray with interstitial infiltrates in setting of sepsis  Concern for aspiration in setting of altered mental status and alcohol intoxication  Empiric IV Unasyn and azithromycin  Follow sputum and blood cultures  Pending procalcitonin

## 2022-07-23 NOTE — ASSESSMENT & PLAN NOTE
Sepsis and alcohol intoxication contributing  Frequent neurochecks  Management for sepsis  Aggressive IV hydration  Labs in a.m.

## 2022-07-23 NOTE — ED TRIAGE NOTES
"Chief Complaint   Patient presents with   • Closed Head Injury     Patient states \"I bumped my head.\" Patient denies falling but states \"It was a really big rock.\" -thinners    • Altered Mental Status     Patient alert and oriented to self only. Patient is not answering questions appropriately. Patient states \"I can't think right now.\" Patient denies drug or alcohol use.        Patient brought in by EMS from [facility] for chest pain. Patient given 324 ASA. Patient denies any chest pain at this time. Sepsis score of 4 on arrival. Patient placed on 2L NC. Protocol initiated   "

## 2022-07-23 NOTE — ASSESSMENT & PLAN NOTE
Reported history of  Off of methimazole for the past 5 to 6 months  7/16 admission labs: TSH <0.005, fT4 1.35, anti-thyroglobulin negative, anti-TPO 10.7  Repeat labs  Monitor

## 2022-07-23 NOTE — PROGRESS NOTES
4 Eyes Skin Assessment Completed by HARSHAL Garvin and HARSHAL Stewart.    Head Scab  Ears Redness scabs  Nose Scab  Mouth WDL  Neck Scab  Breast/Chest Scab  Shoulder Blades WDL  Spine WDL  (R) Arm/Elbow/Hand Redness and Scab  (L) Arm/Elbow/Hand Redness and Scab open wound on knuckle  Abdomen WDL  Groin WDL  Scrotum/Coccyx/Buttocks Redness scrotum swelling  (R) Leg Scar  (L) Leg Scar  (R) Heel/Foot/Toe Scab  (L) Heel/Foot/Toe WDL          Devices In Places Tele Box, Blood Pressure Cuff and Pulse Ox      Interventions In Place Pillows and Pressure Redistribution Mattress    Possible Skin Injury Yes    Pictures Uploaded Into Epic No, needs to be completed  Wound Consult Placed N/A  RN Wound Prevention Protocol Ordered Yes

## 2022-07-23 NOTE — CARE PLAN
Problem: Hemodynamics  Goal: Patient's hemodynamics, fluid balance and neurologic status will be stable or improve  Outcome: Progressing     Problem: Fluid Volume  Goal: Fluid volume balance will be maintained  Outcome: Progressing     Problem: Urinary - Renal Perfusion  Goal: Ability to achieve and maintain adequate renal perfusion and functioning will improve  Outcome: Progressing     Problem: Respiratory  Goal: Patient will achieve/maintain optimum respiratory ventilation and gas exchange  Outcome: Progressing     Problem: Fall Risk  Goal: Patient will remain free from falls  Outcome: Progressing   The patient is Stable - Low risk of patient condition declining or worsening    Shift Goals  Clinical Goals: Safety, fluids  Patient Goals: sleep  Family Goals: n/a    Progress made toward(s) clinical / shift goals:  Patient brought to the floor and fluids being administered    Patient is not progressing towards the following goals:

## 2022-07-23 NOTE — ED PROVIDER NOTES
"Answer \"ED Provider Note    Chief Complaint:   Shortness of breath    HPI:  James Hurley is a very pleasant 61-year-old male who presents with productive cough, shortness of breath for several days.  Patient also reports that he suffered head and facial trauma several days ago.  Patient was at the [facility] previous to this facility.  Patient is a poor historian at this time and is only oriented to person and hospital.  Patient denies chest pain at this time.  Patient denies nausea or vomiting with diarrhea.  Patient does endorse fevers chills and body aches.    Review of Systems:  Review of Systems   Unable to perform ROS: Mental status change       Family History:  No family history on file.    Past Medical History:   has a past medical history of Hypothyroid.    Social History:  Social History     Tobacco Use   • Smoking status: Current Every Day Smoker   • Smokeless tobacco: Current User   Substance and Sexual Activity   • Alcohol use: Yes   • Drug use: Not Currently   • Sexual activity: Not on file       Surgical History:  patient denies any surgical history    Allergies:  No Known Allergies    Physical Exam:  Vital Signs: /76   Pulse 96   Temp 36.9 °C (98.5 °F)   Resp 14   Ht 1.803 m (5' 11\")   Wt 62.1 kg (137 lb)   SpO2 98%   BMI 19.11 kg/m²   Physical Exam  Vitals and nursing note reviewed.   Constitutional:       Comments: Patient is lying in bed in right lateral acute position, disheveled, unkempt   HENT:      Head:      Comments: Healed abrasions to the face and nose  Eyes:      Extraocular Movements: Extraocular movements intact.      Conjunctiva/sclera: Conjunctivae normal.      Pupils: Pupils are equal, round, and reactive to light.   Cardiovascular:      Pulses: Normal pulses.      Comments:   Pulmonary:      Effort: Pulmonary effort is normal. No respiratory distress.      Breath sounds: No rales.   Musculoskeletal:         General: No swelling. Normal range of motion.      " Cervical back: Normal range of motion. No rigidity.      Comments: Midline C/T/L-spine tenderness to palpation without step-offs or deformities   Skin:     General: Skin is warm and dry.      Capillary Refill: Capillary refill takes less than 2 seconds.   Neurological:      Mental Status: He is alert.      Comments: Patient is moving all extremities, sensation to light touch is grossly intact in the bilateral upper and lower extremities.  Patient is oriented only to person and place         Medical records reviewed for continuity of care.     Results for orders placed or performed during the hospital encounter of 07/22/22   Lactic acid (lactate)   Result Value Ref Range    Lactic Acid 2.5 (H) 0.5 - 2.0 mmol/L   Lactic acid (lactate): Repeat if initial lactic acid result is greater than 2   Result Value Ref Range    Lactic Acid 1.9 0.5 - 2.0 mmol/L   CBC WITH DIFFERENTIAL   Result Value Ref Range    WBC 18.4 (H) 4.8 - 10.8 K/uL    RBC 4.54 (L) 4.70 - 6.10 M/uL    Hemoglobin 15.8 14.0 - 18.0 g/dL    Hematocrit 43.4 42.0 - 52.0 %    MCV 95.6 81.4 - 97.8 fL    MCH 34.8 (H) 27.0 - 33.0 pg    MCHC 36.4 (H) 33.7 - 35.3 g/dL    RDW 43.9 35.9 - 50.0 fL    Platelet Count 377 164 - 446 K/uL    MPV 8.6 (L) 9.0 - 12.9 fL    Neutrophils-Polys 87.90 (H) 44.00 - 72.00 %    Lymphocytes 5.20 (L) 22.00 - 41.00 %    Monocytes 5.70 0.00 - 13.40 %    Eosinophils 0.40 0.00 - 6.90 %    Basophils 0.30 0.00 - 1.80 %    Immature Granulocytes 0.50 0.00 - 0.90 %    Nucleated RBC 0.00 /100 WBC    Neutrophils (Absolute) 16.14 (H) 1.82 - 7.42 K/uL    Lymphs (Absolute) 0.95 (L) 1.00 - 4.80 K/uL    Monos (Absolute) 1.05 (H) 0.00 - 0.85 K/uL    Eos (Absolute) 0.07 0.00 - 0.51 K/uL    Baso (Absolute) 0.06 0.00 - 0.12 K/uL    Immature Granulocytes (abs) 0.10 0.00 - 0.11 K/uL    NRBC (Absolute) 0.00 K/uL   COMP METABOLIC PANEL   Result Value Ref Range    Sodium 131 (L) 135 - 145 mmol/L    Potassium 3.6 3.6 - 5.5 mmol/L    Chloride 97 96 - 112 mmol/L     Co2 20 20 - 33 mmol/L    Anion Gap 14.0 7.0 - 16.0    Glucose 89 65 - 99 mg/dL    Bun 6 (L) 8 - 22 mg/dL    Creatinine 0.56 0.50 - 1.40 mg/dL    Calcium 8.8 8.5 - 10.5 mg/dL    AST(SGOT) 19 12 - 45 U/L    ALT(SGPT) 16 2 - 50 U/L    Alkaline Phosphatase 112 (H) 30 - 99 U/L    Total Bilirubin 0.6 0.1 - 1.5 mg/dL    Albumin 3.8 3.2 - 4.9 g/dL    Total Protein 6.9 6.0 - 8.2 g/dL    Globulin 3.1 1.9 - 3.5 g/dL    A-G Ratio 1.2 g/dL   URINALYSIS    Specimen: Urine   Result Value Ref Range    Color Yellow     Character Clear     Specific Gravity 1.007 <1.035    Ph 5.0 5.0 - 8.0    Glucose Negative Negative mg/dL    Ketones Negative Negative mg/dL    Protein Negative Negative mg/dL    Bilirubin Negative Negative    Urobilinogen, Urine 0.2 Negative    Nitrite Negative Negative    Leukocyte Esterase Negative Negative    Occult Blood Negative Negative    Micro Urine Req see below    ESTIMATED GFR   Result Value Ref Range    GFR (CKD-EPI) 113 >60 mL/min/1.73 m 2   COV-2, FLU A/B, AND RSV BY PCR (2-4 HOURS CEPHEID): Collect NP swab in VTM    Specimen: Respirate   Result Value Ref Range    Influenza virus A RNA Negative Negative    Influenza virus B, PCR Negative Negative    RSV, PCR Negative Negative    SARS-CoV-2 by PCR NotDetected     SARS-CoV-2 Source NP Swab    AMMONIA   Result Value Ref Range    Ammonia 27 11 - 45 umol/L   ACETAMINOPHEN   Result Value Ref Range    Acetaminophen -Tylenol 14.0 10.0 - 30.0 ug/mL   Salicylate   Result Value Ref Range    Salicylates, Quant. 1.0 (L) 15.0 - 25.0 mg/dL   URINE DRUG SCREEN   Result Value Ref Range    Amphetamines Urine Negative Negative    Barbiturates Negative Negative    Benzodiazepines Negative Negative    Cocaine Metabolite Negative Negative    Methadone Negative Negative    Opiates Negative Negative    Oxycodone Negative Negative    Phencyclidine -Pcp Negative Negative    Propoxyphene Negative Negative    Cannabinoid Metab Positive (A) Negative   DIAGNOSTIC ALCOHOL   Result  Value Ref Range    Diagnostic Alcohol 89.8 (H) <10.1 mg/dL       Radiology:  CT-HEAD W/O   Final Result         1.  No acute intracranial abnormality is identified, there are nonspecific white matter changes, commonly associated with small vessel ischemic disease.  Associated mild cerebral atrophy is noted.   2.  Mild pansinusitis changes   3.  Atherosclerosis.         CT-CSPINE WITHOUT PLUS RECONS   Final Result         1.  Multilevel degenerative changes of the cervical spine limit diagnostic sensitivity of this examination, otherwise no acute traumatic bony injury of the cervical spine is apparent.   2.  Severe neural foraminal stenosis at C3/C4 on the left.   3.  Atherosclerosis      CT-MAXILLOFACIAL W/O   Final Result         1.  No acute traumatic facial bony injuries identified.   2.  Bilateral occlusion of ostiomeatal units   3.  Mild pansinusitis changes      DX-LUMBAR SPINE-2 OR 3 VIEWS   Final Result         1.  No acute traumatic bony injury of the lumbar spine.   2.  Atherosclerosis      DX-THORACIC SPINE-2 VIEWS   Final Result         1.  No acute traumatic bony injury of the thoracic spine.   2.  Atherosclerosis      DX-CHEST-PORTABLE (1 VIEW)   Final Result         1.  Mild edema and/or infiltrates.   2.  Atherosclerosis           MDM:  Patient was worked up for CNS infection on his recent admission 2 weeks ago and found to have normal pressure hydrocephalus which was explaining some of his altered mental status.  He was also recently diagnosed with atypical pneumonia and discharged after some improvement with antibiotics.  Patient is unable to tell me whether he has been taking those antibiotics.  Patient is presenting similarly to how he presented on 7/10/2022.  Patient is septic at this time and febrile, likely source is respiratory given desaturations, inspiratory rales, recent infection that was potentially not fully treated due to social issues and difficulty acquiring antibiotics.  CT cervical  spine, x-ray of the lumbar and thoracic spine to evaluate for acute spinal injury.  CT head and CT max face to evaluate for facial fracture or intracranial hemorrhage.  Disposition pending work-up.  Sepsis bundle has been ordered.    Electronically signed by: Isai Martinez M.D., 7/22/2022, 10:16 PM    CT head demonstrates no evidence of acute intracranial abnormality, no hemorrhage, no masses.  CT cervical spine demonstrates no acute cervical spine injury.  CT max face demonstrates no evidence of acute fracture or dislocation.  X-ray of the lumbar spine demonstrates no acute traumatic bony injury.  Urinalysis unremarkable for UTI.  Lactic acid is downtrending.  X-ray imaging consistent with pneumonia, patient likely has had undertreated pneumonia due to failure of outpatient antibiotic adherence.  Patient consulted Dr. Espinoza for admission.  Altered mental status likely secondary to normal pressure hydrocephalus in addition to alcohol intoxication in addition to acute sepsis secondary to pneumonia.  Patient without neck rigidity or meningismus, meningitis versus encephalitis is inconsistent with patient presentation at this time.    This dictation has been created using voice recognition software. I am continuously working with the software to minimize the number of voice recognition errors and I have made every attempt to manually correct the errors within my dictation. However errors  related to this voice recognition software may still exist and should be interpreted within the appropriate context.     Electronically signed by: Isai Martinez M.D., 7/23/2022 2:12 AM    Critical Care    I spent 38 minutes of critical care time with this patient. This does not include time spent on separately reported billable procedures.   This includes pulse ox interpretation, medical record review when available, interpretation of labs and imaging, specialist consultation, and hemodynamic  monitoring.      Disposition:  Hospital medicine    Final Impression:  1. Sepsis, due to unspecified organism, unspecified whether acute organ dysfunction present (HCC)    2. Pneumonia of both lungs due to infectious organism, unspecified part of lung    3. Altered mental status, unspecified altered mental status type        Electronically signed by: Isai Martinez M.D., 7/23/2022 2:12 AM

## 2022-07-23 NOTE — PROGRESS NOTES
Assumed care of patient, bedside report received from ER RN. Updated on plan of care, call light within reach and fall precautions are in place and bed lock and in lowest position. Patient instructed to call for assistance before getting out of bed. All questions answered at this time. No other needs.

## 2022-07-24 ENCOUNTER — PHARMACY VISIT (OUTPATIENT)
Dept: PHARMACY | Facility: MEDICAL CENTER | Age: 62
End: 2022-07-24
Payer: COMMERCIAL

## 2022-07-24 VITALS
HEART RATE: 107 BPM | WEIGHT: 148.59 LBS | DIASTOLIC BLOOD PRESSURE: 70 MMHG | RESPIRATION RATE: 20 BRPM | OXYGEN SATURATION: 96 % | HEIGHT: 71 IN | TEMPERATURE: 96.6 F | SYSTOLIC BLOOD PRESSURE: 118 MMHG | BODY MASS INDEX: 20.8 KG/M2

## 2022-07-24 LAB
ALBUMIN SERPL BCP-MCNC: 2.5 G/DL (ref 3.2–4.9)
ALBUMIN/GLOB SERPL: 1 G/DL
ALP SERPL-CCNC: 85 U/L (ref 30–99)
ALT SERPL-CCNC: 14 U/L (ref 2–50)
ANION GAP SERPL CALC-SCNC: 8 MMOL/L (ref 7–16)
AST SERPL-CCNC: 19 U/L (ref 12–45)
BASOPHILS # BLD AUTO: 0.2 % (ref 0–1.8)
BASOPHILS # BLD: 0.03 K/UL (ref 0–0.12)
BILIRUB SERPL-MCNC: 0.5 MG/DL (ref 0.1–1.5)
BUN SERPL-MCNC: 4 MG/DL (ref 8–22)
CALCIUM SERPL-MCNC: 8 MG/DL (ref 8.5–10.5)
CHLORIDE SERPL-SCNC: 106 MMOL/L (ref 96–112)
CO2 SERPL-SCNC: 23 MMOL/L (ref 20–33)
CREAT SERPL-MCNC: 0.39 MG/DL (ref 0.5–1.4)
EOSINOPHIL # BLD AUTO: 0.07 K/UL (ref 0–0.51)
EOSINOPHIL NFR BLD: 0.5 % (ref 0–6.9)
ERYTHROCYTE [DISTWIDTH] IN BLOOD BY AUTOMATED COUNT: 44.9 FL (ref 35.9–50)
GFR SERPLBLD CREATININE-BSD FMLA CKD-EPI: 126 ML/MIN/1.73 M 2
GLOBULIN SER CALC-MCNC: 2.6 G/DL (ref 1.9–3.5)
GLUCOSE SERPL-MCNC: 96 MG/DL (ref 65–99)
HCT VFR BLD AUTO: 38.1 % (ref 42–52)
HGB BLD-MCNC: 13.4 G/DL (ref 14–18)
IMM GRANULOCYTES # BLD AUTO: 0.14 K/UL (ref 0–0.11)
IMM GRANULOCYTES NFR BLD AUTO: 0.9 % (ref 0–0.9)
LYMPHOCYTES # BLD AUTO: 1.18 K/UL (ref 1–4.8)
LYMPHOCYTES NFR BLD: 7.7 % (ref 22–41)
MCH RBC QN AUTO: 34.3 PG (ref 27–33)
MCHC RBC AUTO-ENTMCNC: 35.2 G/DL (ref 33.7–35.3)
MCV RBC AUTO: 97.4 FL (ref 81.4–97.8)
MONOCYTES # BLD AUTO: 0.45 K/UL (ref 0–0.85)
MONOCYTES NFR BLD AUTO: 2.9 % (ref 0–13.4)
NEUTROPHILS # BLD AUTO: 13.53 K/UL (ref 1.82–7.42)
NEUTROPHILS NFR BLD: 87.8 % (ref 44–72)
NRBC # BLD AUTO: 0 K/UL
NRBC BLD-RTO: 0 /100 WBC
PLATELET # BLD AUTO: 297 K/UL (ref 164–446)
PMV BLD AUTO: 9 FL (ref 9–12.9)
POTASSIUM SERPL-SCNC: 3.1 MMOL/L (ref 3.6–5.5)
PROT SERPL-MCNC: 5.1 G/DL (ref 6–8.2)
RBC # BLD AUTO: 3.91 M/UL (ref 4.7–6.1)
SODIUM SERPL-SCNC: 137 MMOL/L (ref 135–145)
WBC # BLD AUTO: 15.4 K/UL (ref 4.8–10.8)

## 2022-07-24 PROCEDURE — 36415 COLL VENOUS BLD VENIPUNCTURE: CPT

## 2022-07-24 PROCEDURE — 80053 COMPREHEN METABOLIC PANEL: CPT

## 2022-07-24 PROCEDURE — 700102 HCHG RX REV CODE 250 W/ 637 OVERRIDE(OP): Performed by: STUDENT IN AN ORGANIZED HEALTH CARE EDUCATION/TRAINING PROGRAM

## 2022-07-24 PROCEDURE — 85025 COMPLETE CBC W/AUTO DIFF WBC: CPT

## 2022-07-24 PROCEDURE — 700111 HCHG RX REV CODE 636 W/ 250 OVERRIDE (IP): Performed by: STUDENT IN AN ORGANIZED HEALTH CARE EDUCATION/TRAINING PROGRAM

## 2022-07-24 PROCEDURE — RXMED WILLOW AMBULATORY MEDICATION CHARGE: Performed by: STUDENT IN AN ORGANIZED HEALTH CARE EDUCATION/TRAINING PROGRAM

## 2022-07-24 PROCEDURE — 99239 HOSP IP/OBS DSCHRG MGMT >30: CPT | Performed by: STUDENT IN AN ORGANIZED HEALTH CARE EDUCATION/TRAINING PROGRAM

## 2022-07-24 PROCEDURE — A9270 NON-COVERED ITEM OR SERVICE: HCPCS | Performed by: STUDENT IN AN ORGANIZED HEALTH CARE EDUCATION/TRAINING PROGRAM

## 2022-07-24 PROCEDURE — 700105 HCHG RX REV CODE 258: Performed by: STUDENT IN AN ORGANIZED HEALTH CARE EDUCATION/TRAINING PROGRAM

## 2022-07-24 RX ORDER — DOXYCYCLINE 100 MG/1
100 CAPSULE ORAL 2 TIMES DAILY
Qty: 6 CAPSULE | Refills: 0 | Status: SHIPPED | OUTPATIENT
Start: 2022-07-24 | End: 2022-07-27

## 2022-07-24 RX ORDER — AMOXICILLIN AND CLAVULANATE POTASSIUM 875; 125 MG/1; MG/1
1 TABLET, FILM COATED ORAL 2 TIMES DAILY
Qty: 6 TABLET | Refills: 0 | Status: SHIPPED | OUTPATIENT
Start: 2022-07-24 | End: 2022-07-27

## 2022-07-24 RX ORDER — LOPERAMIDE HYDROCHLORIDE 2 MG/1
2 CAPSULE ORAL 4 TIMES DAILY PRN
Status: DISCONTINUED | OUTPATIENT
Start: 2022-07-24 | End: 2022-07-24 | Stop reason: HOSPADM

## 2022-07-24 RX ORDER — LOPERAMIDE HYDROCHLORIDE 2 MG/1
2 CAPSULE ORAL 4 TIMES DAILY PRN
Qty: 12 CAPSULE | Refills: 0 | Status: SHIPPED | OUTPATIENT
Start: 2022-07-24 | End: 2022-07-27

## 2022-07-24 RX ADMIN — LOPERAMIDE HYDROCHLORIDE 2 MG: 2 CAPSULE ORAL at 11:50

## 2022-07-24 RX ADMIN — Medication 1 MG: at 06:02

## 2022-07-24 RX ADMIN — AZITHROMYCIN MONOHYDRATE 500 MG: 250 TABLET ORAL at 06:02

## 2022-07-24 RX ADMIN — THIAMINE HYDROCHLORIDE 500 MG: 100 INJECTION, SOLUTION INTRAMUSCULAR; INTRAVENOUS at 10:24

## 2022-07-24 RX ADMIN — THERA TABS 1 TABLET: TAB at 06:02

## 2022-07-24 RX ADMIN — ACETAMINOPHEN 1000 MG: 500 TABLET, FILM COATED ORAL at 10:23

## 2022-07-24 RX ADMIN — SODIUM CHLORIDE, POTASSIUM CHLORIDE, SODIUM LACTATE AND CALCIUM CHLORIDE: 600; 310; 30; 20 INJECTION, SOLUTION INTRAVENOUS at 06:02

## 2022-07-24 RX ADMIN — SODIUM CHLORIDE 3 G: 900 INJECTION INTRAVENOUS at 03:03

## 2022-07-24 RX ADMIN — SODIUM CHLORIDE 3 G: 900 INJECTION INTRAVENOUS at 10:24

## 2022-07-24 ASSESSMENT — LIFESTYLE VARIABLES
VISUAL DISTURBANCES: NOT PRESENT
TREMOR: NO TREMOR
PAROXYSMAL SWEATS: NO SWEAT VISIBLE
VISUAL DISTURBANCES: NOT PRESENT
TOTAL SCORE: 4
AUDITORY DISTURBANCES: NOT PRESENT
TREMOR: NO TREMOR
NAUSEA AND VOMITING: NO NAUSEA AND NO VOMITING
ORIENTATION AND CLOUDING OF SENSORIUM: CANNOT DO SERIAL ADDITIONS OR IS UNCERTAIN ABOUT DATE
ORIENTATION AND CLOUDING OF SENSORIUM: CANNOT DO SERIAL ADDITIONS OR IS UNCERTAIN ABOUT DATE
TREMOR: NO TREMOR
NAUSEA AND VOMITING: NO NAUSEA AND NO VOMITING
ANXIETY: NO ANXIETY (AT EASE)
HEADACHE, FULLNESS IN HEAD: MODERATE
AGITATION: NORMAL ACTIVITY
PAROXYSMAL SWEATS: NO SWEAT VISIBLE
AUDITORY DISTURBANCES: NOT PRESENT
VISUAL DISTURBANCES: NOT PRESENT
HEADACHE, FULLNESS IN HEAD: MILD
AGITATION: NORMAL ACTIVITY
ANXIETY: NO ANXIETY (AT EASE)
NAUSEA AND VOMITING: NO NAUSEA AND NO VOMITING
VISUAL DISTURBANCES: NOT PRESENT
AUDITORY DISTURBANCES: NOT PRESENT
AUDITORY DISTURBANCES: NOT PRESENT
ORIENTATION AND CLOUDING OF SENSORIUM: CANNOT DO SERIAL ADDITIONS OR IS UNCERTAIN ABOUT DATE
TREMOR: NO TREMOR
AGITATION: NORMAL ACTIVITY
ANXIETY: NO ANXIETY (AT EASE)
PAROXYSMAL SWEATS: NO SWEAT VISIBLE
TOTAL SCORE: 3
ORIENTATION AND CLOUDING OF SENSORIUM: CANNOT DO SERIAL ADDITIONS OR IS UNCERTAIN ABOUT DATE
HEADACHE, FULLNESS IN HEAD: MODERATE
TOTAL SCORE: 4
PAROXYSMAL SWEATS: NO SWEAT VISIBLE
AGITATION: NORMAL ACTIVITY
HEADACHE, FULLNESS IN HEAD: MODERATE
NAUSEA AND VOMITING: NO NAUSEA AND NO VOMITING
ANXIETY: NO ANXIETY (AT EASE)
TOTAL SCORE: 4

## 2022-07-24 NOTE — PROGRESS NOTES
Monitor summary  Rhythm: SR  Ectopy: PVCr/PAC   HR:  92-99  .10/.08/.31  Per strip from monitor room

## 2022-07-24 NOTE — PROGRESS NOTES
Assumed care of patient, bedside report received from Jessica day shift RN. Updated on plan of care, call light within reach and fall precautions are in place and bed lock and in lowest position. Patient instructed to call for assistance before getting out of bed. All questions answered at this time. No other needs.

## 2022-07-24 NOTE — CARE PLAN
Problem: Hemodynamics  Goal: Patient's hemodynamics, fluid balance and neurologic status will be stable or improve  Outcome: Progressing     Problem: Fluid Volume  Goal: Fluid volume balance will be maintained  Outcome: Progressing     Problem: Urinary - Renal Perfusion  Goal: Ability to achieve and maintain adequate renal perfusion and functioning will improve  Outcome: Progressing     Problem: Respiratory  Goal: Patient will achieve/maintain optimum respiratory ventilation and gas exchange  Outcome: Progressing     Problem: Fall Risk  Goal: Patient will remain free from falls  Outcome: Progressing   The patient is Stable - Low risk of patient condition declining or worsening    Shift Goals  Clinical Goals: CIWA/ fluids  Patient Goals: sleep  Family Goals: n/a    Progress made toward(s) clinical / shift goals:      Patient is not progressing towards the following goals:
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
## 2022-07-24 NOTE — DISCHARGE INSTRUCTIONS
Discharge Instructions    Discharged to home by taxi with self. Discharged via wheelchair, hospital escort: Yes.  Special equipment needed: Not Applicable    Be sure to schedule a follow-up appointment with your primary care doctor or any specialists as instructed.     Discharge Plan:   Diet Plan: Discussed  Activity Level: Discussed  Confirmed Follow up Appointment: Patient to Call and Schedule Appointment  Confirmed Symptoms Management: Discussed  Medication Reconciliation Updated: Yes    I understand that a diet low in cholesterol, fat, and sodium is recommended for good health. Unless I have been given specific instructions below for another diet, I accept this instruction as my diet prescription.       Special Instructions: Sepsis, Diagnosis, Adult  Sepsis is a serious bodily reaction to an infection. The infection that triggers sepsis may be from a bacteria, virus, or fungus. Sepsis can result from an infection in any part of your body. Infections that commonly lead to sepsis include skin, lung, and urinary tract infections.  Sepsis is a medical emergency that must be treated right away in a hospital. In severe cases, it can lead to septic shock. Septic shock can weaken your heart and cause your blood pressure to drop. This can cause your central nervous system and your body's organs to stop working.  What are the causes?  This condition is caused by a severe reaction to infections from bacteria, viruses, or fungus. The germs that most often lead to sepsis include:  Escherichia coli (E. coli) bacteria.  Staphylococcus aureus (staph) bacteria.  Some types of Streptococcus bacteria.  The most common infections affect these organs:  The lung (pneumonia).  The kidneys or bladder (urinary tract infection).  The skin (cellulitis).  The bowel, gallbladder, or pancreas.  What increases the risk?  You are more likely to develop this condition if:  Your body's disease-fighting system (immune system) is weakened.  You  are age 65 or older.  You are male.  You had surgery or you have been hospitalized.  You have these devices inserted into your body:  A small, thin tube (catheter).  IV line.  Breathing tube.  Drainage tube.  You are not getting enough nutrients from food (malnourished).  You have a long-term (chronic) disease, such as cancer, lung disease, kidney disease, or diabetes.  You are .  What are the signs or symptoms?  Symptoms of this condition may include:  Fever.  Chills or feeling very cold.  Confusion or anxiety.  Fatigue.  Muscle aches.  Shortness of breath.  Nausea and vomiting.  Urinating much less than usual.  Fast heart rate (tachycardia).  Rapid breathing (hyperventilation).  Changes in skin color. Your skin may look blotchy, pale, or blue.  Cool, clammy, or sweaty skin.  Skin rash.  Other symptoms depend on the source of your infection.  How is this diagnosed?  This condition is diagnosed based on:  Your symptoms.  Your medical history.  A physical exam.  Other tests may also be done to find out the cause of the infection and how severe the sepsis is. These tests may include:  Blood tests.  Urine tests.  Swabs from other areas of your body that may have an infection. These samples may be tested (cultured) to find out what type of bacteria is causing the infection.  Chest X-ray to check for pneumonia. Other imaging tests, such as a CT scan, may also be done.  Lumbar puncture. This removes a small amount of the fluid that surrounds your brain and spinal cord. The fluid is then examined for infection.  How is this treated?  This condition must be treated in a hospital. Based on the cause of your infection, you may be given an antibiotic, antiviral, or antifungal medicine.  You may also receive:  Fluids through an IV.  Oxygen and breathing assistance.  Medicines to increase your blood pressure.  Kidney dialysis. This process cleans your blood if your kidneys have failed.  Surgery to remove infected  tissue.  Blood transfusion if needed.  Medicine to prevent blood clots.  Nutrients to correct imbalances in basic body function (metabolism). You may:  Receive important salts and minerals (electrolytes) through an IV.  Have your blood sugar level adjusted.  Follow these instructions at home:  Medicines    Take over-the-counter and prescription medicines only as told by your health care provider.  If you were prescribed an antibiotic, antiviral, or antifungal medicine, take it as told by your health care provider. Do not stop taking the medicine even if you start to feel better.  General instructions  If you have a catheter or other indwelling device, ask to have it removed as soon as possible.  Keep all follow-up visits as told by your health care provider. This is important.  Contact a health care provider if:  You do not feel like you are getting better or regaining strength.  You are having trouble coping with your recovery.  You frequently feel tired.  You feel worse or do not seem to get better after surgery.  You think you may have an infection after surgery.  Get help right away if:  You have any symptoms of sepsis.  You have difficulty breathing.  You have a rapid or skipping heartbeat.  You become confused or disoriented.  You have a high fever.  Your skin becomes blotchy, pale, or blue.  You have an infection that is getting worse or not getting better.  These symptoms may represent a serious problem that is an emergency. Do not wait to see if the symptoms will go away. Get medical help right away. Call your local emergency services (911 in the [place] Do not drive yourself to the hospital.  Summary  Sepsis is a medical emergency that requires immediate treatment in a hospital.  This condition is caused by a severe reaction to infections from bacteria, viruses, or fungus.  Based on the cause of your infection, you may be given an antibiotic, antiviral, or antifungal medicine.  Treatment may also include IV  fluids, breathing assistance, and kidney dialysis.  This information is not intended to replace advice given to you by your health care provider. Make sure you discuss any questions you have with your health care provider.  Document Released: 09/15/2004 Document Revised: 07/26/2019 Document Reviewed: 07/26/2019  Elsevier Patient Education © 2020 [employer]    -Is this patient being discharged with medication to prevent blood clots?  No    Is patient discharged on Warfarin / Coumadin?   No
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
## 2022-07-24 NOTE — PROGRESS NOTES
Monitor Summary:   Rhythm: SR-ST  Rate:   Measurement: .12/.08/.38  Ectopy: PAC's, PVC's, frequent couplets & triplets. 6 Beat VT

## 2022-07-24 NOTE — CARE PLAN
The patient is Stable - Low risk of patient condition declining or worsening    Shift Goals  Clinical Goals: CIWA and safety  Patient Goals: rest and stop diarrhea  Family Goals: n/a    Progress made toward(s) clinical / shift goals: Pt AAOx3-4, scoring below 5 on CIWA, s/p IV abx for sepsis, satting 92 on RA, VSS. Being dc'd on PO abx w/ cab voucher back to [facility]/homeless. Immodium given for loose stools.      Problem: Hemodynamics  Goal: Patient's hemodynamics, fluid balance and neurologic status will be stable or improve  Outcome: Progressing     Problem: Fluid Volume  Goal: Fluid volume balance will be maintained  Outcome: Progressing     Problem: Urinary - Renal Perfusion  Goal: Ability to achieve and maintain adequate renal perfusion and functioning will improve  Outcome: Progressing     Problem: Respiratory  Goal: Patient will achieve/maintain optimum respiratory ventilation and gas exchange  Outcome: Progressing     Problem: Mechanical Ventilation  Goal: Safe management of artificial airway and ventilation  Outcome: Progressing  Goal: Successful weaning off mechanical ventilator, spontaneously maintains adequate gas exchange  Outcome: Progressing  Goal: Patient will be able to express needs and understand communication  Outcome: Progressing     Problem: Physical Regulation  Goal: Diagnostic test results will improve  Outcome: Progressing  Goal: Signs and symptoms of infection will decrease  Outcome: Progressing     Problem: Knowledge Deficit - Standard  Goal: Patient and family/care givers will demonstrate understanding of plan of care, disease process/condition, diagnostic tests and medications  Outcome: Progressing     Problem: Fall Risk  Goal: Patient will remain free from falls  Outcome: Progressing     Problem: Pain - Standard  Goal: Alleviation of pain or a reduction in pain to the patient’s comfort goal  Outcome: Progressing     Problem: Optimal Care for Alcohol Withdrawal  Goal: Optimal Care  for the alcohol withdrawal patient  Outcome: Progressing     Problem: Seizure Precautions  Goal: Implementation of seizure precautions  Outcome: Progressing     Problem: Lifestyle Changes  Goal: Patient's ability to identify lifestyle changes and available resources to help reduce recurrence of condition will improve  Outcome: Progressing     Problem: Psychosocial  Goal: Patient's level of anxiety will decrease  Outcome: Progressing  Goal: Spiritual and cultural needs incorporated into hospitalization  Outcome: Progressing     Problem: Risk for Aspiration  Goal: Patient's risk for aspiration will be absent or decrease  Outcome: Progressing

## 2022-07-24 NOTE — PROGRESS NOTES
Prescriptions given to patient. Discharge instructions given to patient at bedside, verbalizes understanding and states plans to establish PCP and f/u. New and home medication review, post-discharge activity level and worsening of symptoms needing follow-up care discussed. Telemetry monitor/IV cathlon removed. All belongings accounted for, all questions answered at this time. Patient wheelchair escorted out w/ CNA to cab.

## 2022-07-25 LAB
BACTERIA UR CULT: NORMAL
SIGNIFICANT IND 70042: NORMAL
SITE SITE: NORMAL
SOURCE SOURCE: NORMAL

## 2022-07-25 NOTE — DISCHARGE SUMMARY
"Discharge Summary    CHIEF COMPLAINT ON ADMISSION  Chief Complaint   Patient presents with   • Closed Head Injury     Patient states \"I bumped my head.\" Patient denies falling but states \"It was a really big rock.\" -thinners    • Altered Mental Status     Patient alert and oriented to self only. Patient is not answering questions appropriately. Patient states \"I can't think right now.\" Patient denies drug or alcohol use.        Reason for Admission  Sepsis (HCC)     Admission Date  7/22/2022    CODE STATUS  Full Code    HPI & HOSPITAL COURSE  This is a 61 y.o. male here with ground-level fall  61-year-old male with a past medical history of hypothyroidism, NPH and recent hospital discharge on 7/16/2022 for sepsis secondary to atypical pneumonia for which patient was discharged on p.o. antibiotics presents emergency department on 7/22/2022 with a dyspnea, cough and altered mental status after reported head collision.  On interview patient AAOX2 (Self and place).  Patient reports having a headache and thinks it is secondary to a fall but he is not sure. Denies abdominal pain, nausea, vomiting, dyspnea, cough or LOC.  Patient remembers that he was in the hospital not long ago but does not remember if he finished taking antibiotics.     At the emergency department, vital signs with 100.5 fever, tachycardia in the 130s, tachypnea in the 20s.  Patient saturating well room air.  CBC with leukocytosis at 18.4, no anemia.  Chemistry with hyponatremia, otherwise normal.  Lactic acid 2.5.  Viral panel negative.  Acetaminophen levels at 14.  Diagnostic alcohol 89.8, ammonia and salicylate level low.  UDS positive for cannabis.  Urinalysis without signs of infection.  Head CT with no acute intracranial normalities for patient noted for mild pansinusitis.  Chest x-ray with mild edema and or infiltrates.  Cervical, thoracic and lumbar x-rays without signs of fracture.  Blood samples were collected for culture and patient was " started on a vancomycin antibiotic regimen and received a 1 L bolus.  Patient was admitted for sepsis of unknown etiology which requires IV antibiotics and further work-up as well as alcohol intoxication.       Patient had clinical improvement back to his baseline on day of discharge.  Patient was not requiring additional CIWA medications.  Patient had a walker with him upon discharge.  Therefore, he is discharged in good and stable condition to home with close outpatient follow-up.    The patient met 2-midnight criteria for an inpatient stay at the time of discharge.    Discharge Date  7/24/2022    FOLLOW UP ITEMS POST DISCHARGE  Take all medications as prescribed  Go to all follow-up appointments as indicated    DISCHARGE DIAGNOSES  Principal Problem:    Sepsis (HCC) POA: Yes  Active Problems:    Atypical pneumonia POA: Yes    History of hyperthyroidism POA: Yes    Acute metabolic encephalopathy POA: Yes    Leukocytosis POA: Yes    Alcoholic intoxication without complication (HCC) POA: Yes  Resolved Problems:    * No resolved hospital problems. *      FOLLOW UP  No future appointments.  [facility] ([facility]) - Primary Care and Family Medicine  [address] [place] [address]  [phone]  Go to  As needed      MEDICATIONS ON DISCHARGE     Medication List      START taking these medications      Instructions   amoxicillin-clavulanate 875-125 MG Tabs  Commonly known as: AUGMENTIN   Take 1 Tablet by mouth 2 times a day for 3 days.  Dose: 1 Tablet     doxycycline 100 MG capsule  Commonly known as: MONODOX   Take 1 Capsule by mouth 2 times a day for 3 days.  Dose: 100 mg     loperamide 2 MG Caps  Commonly known as: IMODIUM   Take 1 Capsule by mouth 4 times a day as needed for Diarrhea for up to 3 days.  Dose: 2 mg        CONTINUE taking these medications      Instructions   albuterol 108 (90 Base) MCG/ACT Aers inhalation aerosol   Inhale 2 Puffs every 6 hours as needed for Shortness of  Breath.  Dose: 2 Puff     vitamin B-12 CR 1000 MCG Tbcr tablet  Commonly known as: CYANOCOBALAMIN   Take 1 tablet by mouth every day.  Dose: 1,000 mcg            Allergies  No Known Allergies    DIET  Orders Placed This Encounter   Procedures   • Diet Order Diet: Consistent CHO (Diabetic)     Standing Status:   Standing     Number of Occurrences:   1     Order Specific Question:   Diet:     Answer:   Consistent CHO (Diabetic) [4]   • Discontinue Diet Tray     Standing Status:   Standing     Number of Occurrences:   1       ACTIVITY  As tolerated.  Weight bearing as tolerated    CONSULTATIONS  None    PROCEDURES  None    LABORATORY  Lab Results   Component Value Date    SODIUM 137 07/24/2022    POTASSIUM 3.1 (L) 07/24/2022    CHLORIDE 106 07/24/2022    CO2 23 07/24/2022    GLUCOSE 96 07/24/2022    BUN 4 (L) 07/24/2022    CREATININE 0.39 (L) 07/24/2022        Lab Results   Component Value Date    WBC 15.4 (H) 07/24/2022    HEMOGLOBIN 13.4 (L) 07/24/2022    HEMATOCRIT 38.1 (L) 07/24/2022    PLATELETCT 297 07/24/2022      Please note that this dictation was created using voice recognition software. I have made every reasonable attempt to correct obvious errors, but I expect that there are errors of grammar and possibly context that I did not discover before finalizing the note.    Total time of the discharge process exceeds 35 minutes.

## 2022-07-27 LAB
BACTERIA BLD CULT: NORMAL
BACTERIA BLD CULT: NORMAL
SIGNIFICANT IND 70042: NORMAL
SIGNIFICANT IND 70042: NORMAL
SITE SITE: NORMAL
SITE SITE: NORMAL
SOURCE SOURCE: NORMAL
SOURCE SOURCE: NORMAL

## 2022-08-02 NOTE — DOCUMENTATION QUERY
[facility]                                                                       Query Response Note      PATIENT:               ORLIN STAPLES  ACCT #:                  [id number]  MRN:                     [id number]  :                      1960  ADMIT DATE:       2022 8:24 PM  DISCH DATE:        2022 5:07 PM  RESPONDING  PROVIDER #:        [id number]           QUERY TEXT:    Your assistance with confirmation/validation of a documented diagnosis is requested.    Diagnosis:  Sepsis     Note: If an appropriate response is not listed below, please respond with a new note.    Thank you.             The patient's Clinical Indicators include:  61-year-old male who presents with productive cough, shortness of breath for several days. - Dr. Martinez, ED Note, 22    Patient was admitted for sepsis of unknown etiology which requires IV antibiotics and further work-up as well as alcohol intoxication. - Dr. Lawler, Discharge Summary, 22    Sepsis, pneumonia, metabolic encephalopathy. - Dr. Marquez, Hospitalist Note, 22    LOS: 2    WBC: 18.4, 16.9, 15.4  Lactate: 2.5, 1.9    Temp:  98.5 °F-100.5 °F  Pulse:   Resp:  14-29     CXR: Mild edema and/or infiltrates.    Risk factor: pneumonia    Treatment: IV antibiotics    Thank you,  Caprice Sun  Clinical   Connect via [employer] Messenger  Options provided:   -- Sepsis exists, (please document additional + SIRS criteria and clinical indicators)   -- Sepsis does not exist - amended documentation in the medical record and updated problem list   -- Other explanation, Please specify   -- Unable to determine      Query created by: Caprice Sun on 2022 5:52 AM    RESPONSE TEXT:    sepsis exists - productive cough          Electronically signed by:  RENEE LAWLER MD 2022 7:15 AM

## 2022-09-12 ENCOUNTER — APPOINTMENT (OUTPATIENT)
Dept: URGENT CARE | Facility: CLINIC | Age: 62
End: 2022-09-12
Payer: MEDICAID

## 2023-03-11 ENCOUNTER — HOSPITAL ENCOUNTER (INPATIENT)
Facility: MEDICAL CENTER | Age: 63
LOS: 5 days | DRG: 086 | End: 2023-03-16
Attending: EMERGENCY MEDICINE | Admitting: HOSPITALIST
Payer: MEDICAID

## 2023-03-11 ENCOUNTER — APPOINTMENT (OUTPATIENT)
Dept: RADIOLOGY | Facility: MEDICAL CENTER | Age: 63
DRG: 086 | End: 2023-03-11
Attending: SURGERY
Payer: MEDICAID

## 2023-03-11 ENCOUNTER — APPOINTMENT (OUTPATIENT)
Dept: RADIOLOGY | Facility: MEDICAL CENTER | Age: 63
DRG: 086 | End: 2023-03-11
Attending: STUDENT IN AN ORGANIZED HEALTH CARE EDUCATION/TRAINING PROGRAM
Payer: MEDICAID

## 2023-03-11 ENCOUNTER — APPOINTMENT (OUTPATIENT)
Dept: RADIOLOGY | Facility: MEDICAL CENTER | Age: 63
DRG: 086 | End: 2023-03-11
Attending: EMERGENCY MEDICINE
Payer: MEDICAID

## 2023-03-11 DIAGNOSIS — F10.939 ALCOHOL WITHDRAWAL SYNDROME WITH COMPLICATION (HCC): ICD-10-CM

## 2023-03-11 DIAGNOSIS — G91.2 NPH (NORMAL PRESSURE HYDROCEPHALUS) (HCC): ICD-10-CM

## 2023-03-11 DIAGNOSIS — I10 PRIMARY HYPERTENSION: ICD-10-CM

## 2023-03-11 DIAGNOSIS — I62.9 INTRACRANIAL BLEEDING (HCC): ICD-10-CM

## 2023-03-11 DIAGNOSIS — F10.20 UNCOMPLICATED ALCOHOL DEPENDENCE (HCC): ICD-10-CM

## 2023-03-11 DIAGNOSIS — R53.1 WEAKNESS: ICD-10-CM

## 2023-03-11 DIAGNOSIS — A41.9 SEPSIS, DUE TO UNSPECIFIED ORGANISM, UNSPECIFIED WHETHER ACUTE ORGAN DYSFUNCTION PRESENT (HCC): ICD-10-CM

## 2023-03-11 DIAGNOSIS — R56.9 SEIZURE (HCC): ICD-10-CM

## 2023-03-11 PROBLEM — S06.36AA: Status: ACTIVE | Noted: 2023-03-11

## 2023-03-11 PROBLEM — I61.9 ICH (INTRACEREBRAL HEMORRHAGE) (HCC): Status: ACTIVE | Noted: 2023-03-11

## 2023-03-11 LAB
ALBUMIN SERPL BCP-MCNC: 4.2 G/DL (ref 3.2–4.9)
ALBUMIN/GLOB SERPL: 1.2 G/DL
ALP SERPL-CCNC: 108 U/L (ref 30–99)
ALT SERPL-CCNC: 16 U/L (ref 2–50)
AMPHET UR QL SCN: NEGATIVE
ANION GAP SERPL CALC-SCNC: 11 MMOL/L (ref 7–16)
APPEARANCE UR: CLEAR
AST SERPL-CCNC: 19 U/L (ref 12–45)
BARBITURATES UR QL SCN: NEGATIVE
BASOPHILS # BLD AUTO: 0.2 % (ref 0–1.8)
BASOPHILS # BLD: 0.05 K/UL (ref 0–0.12)
BENZODIAZ UR QL SCN: NEGATIVE
BILIRUB SERPL-MCNC: 0.6 MG/DL (ref 0.1–1.5)
BILIRUB UR QL STRIP.AUTO: NEGATIVE
BUN SERPL-MCNC: 10 MG/DL (ref 8–22)
BZE UR QL SCN: NEGATIVE
CALCIUM ALBUM COR SERPL-MCNC: 8.8 MG/DL (ref 8.5–10.5)
CALCIUM SERPL-MCNC: 9 MG/DL (ref 8.5–10.5)
CANNABINOIDS UR QL SCN: POSITIVE
CFT BLD TEG: 2.8 MIN (ref 4.6–9.1)
CFT P HPASE BLD TEG: 2.7 MIN (ref 4.3–8.3)
CHLORIDE SERPL-SCNC: 98 MMOL/L (ref 96–112)
CLOT ANGLE BLD TEG: 74.9 DEGREES (ref 63–78)
CLOT LYSIS 30M P MA LENFR BLD TEG: 1 % (ref 0–2.6)
CO2 SERPL-SCNC: 22 MMOL/L (ref 20–33)
COLOR UR: YELLOW
CREAT SERPL-MCNC: 0.55 MG/DL (ref 0.5–1.4)
CT.EXTRINSIC BLD ROTEM: 1.1 MIN (ref 0.8–2.1)
EKG IMPRESSION: NORMAL
EOSINOPHIL # BLD AUTO: 0 K/UL (ref 0–0.51)
EOSINOPHIL NFR BLD: 0 % (ref 0–6.9)
ERYTHROCYTE [DISTWIDTH] IN BLOOD BY AUTOMATED COUNT: 47.8 FL (ref 35.9–50)
ETHANOL BLD-MCNC: <10.1 MG/DL
FLUAV RNA SPEC QL NAA+PROBE: NEGATIVE
FLUBV RNA SPEC QL NAA+PROBE: NEGATIVE
GFR SERPLBLD CREATININE-BSD FMLA CKD-EPI: 113 ML/MIN/1.73 M 2
GLOBULIN SER CALC-MCNC: 3.4 G/DL (ref 1.9–3.5)
GLUCOSE SERPL-MCNC: 101 MG/DL (ref 65–99)
GLUCOSE UR STRIP.AUTO-MCNC: NEGATIVE MG/DL
HCT VFR BLD AUTO: 45 % (ref 42–52)
HGB BLD-MCNC: 15.5 G/DL (ref 14–18)
IMM GRANULOCYTES # BLD AUTO: 0.19 K/UL (ref 0–0.11)
IMM GRANULOCYTES NFR BLD AUTO: 0.9 % (ref 0–0.9)
INR PPP: 1.01 (ref 0.87–1.13)
KETONES UR STRIP.AUTO-MCNC: NEGATIVE MG/DL
LACTATE SERPL-SCNC: 0.7 MMOL/L (ref 0.5–2)
LACTATE SERPL-SCNC: 1.1 MMOL/L (ref 0.5–2)
LACTATE SERPL-SCNC: 1.4 MMOL/L (ref 0.5–2)
LEUKOCYTE ESTERASE UR QL STRIP.AUTO: NEGATIVE
LYMPHOCYTES # BLD AUTO: 0.52 K/UL (ref 1–4.8)
LYMPHOCYTES NFR BLD: 2.6 % (ref 22–41)
MAGNESIUM SERPL-MCNC: 1.8 MG/DL (ref 1.5–2.5)
MCF BLD TEG: 60.4 MM (ref 52–69)
MCF.PLATELET INHIB BLD ROTEM: 20.3 MM (ref 15–32)
MCH RBC QN AUTO: 33.5 PG (ref 27–33)
MCHC RBC AUTO-ENTMCNC: 34.4 G/DL (ref 33.7–35.3)
MCV RBC AUTO: 97.4 FL (ref 81.4–97.8)
METHADONE UR QL SCN: NEGATIVE
MICRO URNS: NORMAL
MONOCYTES # BLD AUTO: 1.25 K/UL (ref 0–0.85)
MONOCYTES NFR BLD AUTO: 6.2 % (ref 0–13.4)
NEUTROPHILS # BLD AUTO: 18.02 K/UL (ref 1.82–7.42)
NEUTROPHILS NFR BLD: 90.1 % (ref 44–72)
NITRITE UR QL STRIP.AUTO: NEGATIVE
NRBC # BLD AUTO: 0 K/UL
NRBC BLD-RTO: 0 /100 WBC
OPIATES UR QL SCN: NEGATIVE
OXYCODONE UR QL SCN: NEGATIVE
PA AA BLD-ACNC: 24.7 % (ref 0–11)
PA ADP BLD-ACNC: 95.1 % (ref 0–17)
PCP UR QL SCN: NEGATIVE
PH UR STRIP.AUTO: 7 [PH] (ref 5–8)
PHOSPHATE SERPL-MCNC: 1.5 MG/DL (ref 2.5–4.5)
PLATELET # BLD AUTO: 307 K/UL (ref 164–446)
PMV BLD AUTO: 9.1 FL (ref 9–12.9)
POTASSIUM SERPL-SCNC: 3.8 MMOL/L (ref 3.6–5.5)
PROCALCITONIN SERPL-MCNC: 0.64 NG/ML
PROPOXYPH UR QL SCN: NEGATIVE
PROT SERPL-MCNC: 7.6 G/DL (ref 6–8.2)
PROT UR QL STRIP: NEGATIVE MG/DL
PROTHROMBIN TIME: 13.2 SEC (ref 12–14.6)
RBC # BLD AUTO: 4.62 M/UL (ref 4.7–6.1)
RBC UR QL AUTO: NEGATIVE
RSV RNA SPEC QL NAA+PROBE: NEGATIVE
SARS-COV-2 RNA RESP QL NAA+PROBE: NOTDETECTED
SCCMEC + MECA PNL NOSE NAA+PROBE: NEGATIVE
SODIUM SERPL-SCNC: 131 MMOL/L (ref 135–145)
SODIUM SERPL-SCNC: 132 MMOL/L (ref 135–145)
SODIUM SERPL-SCNC: 136 MMOL/L (ref 135–145)
SP GR UR STRIP.AUTO: 1.02
SPECIMEN SOURCE: NORMAL
T4 FREE SERPL-MCNC: 1.73 NG/DL (ref 0.93–1.7)
TEG ALGORITHM TGALG: ABNORMAL
TROPONIN T SERPL-MCNC: 19 NG/L (ref 6–19)
TSH SERPL DL<=0.005 MIU/L-ACNC: <0.005 UIU/ML (ref 0.38–5.33)
UROBILINOGEN UR STRIP.AUTO-MCNC: 1 MG/DL
WBC # BLD AUTO: 20 K/UL (ref 4.8–10.8)

## 2023-03-11 PROCEDURE — 0241U HCHG SARS-COV-2 COVID-19 NFCT DS RESP RNA 4 TRGT MIC: CPT

## 2023-03-11 PROCEDURE — 85576 BLOOD PLATELET AGGREGATION: CPT | Mod: 91

## 2023-03-11 PROCEDURE — 700111 HCHG RX REV CODE 636 W/ 250 OVERRIDE (IP): Performed by: EMERGENCY MEDICINE

## 2023-03-11 PROCEDURE — 84484 ASSAY OF TROPONIN QUANT: CPT

## 2023-03-11 PROCEDURE — 83735 ASSAY OF MAGNESIUM: CPT

## 2023-03-11 PROCEDURE — 700111 HCHG RX REV CODE 636 W/ 250 OVERRIDE (IP): Performed by: STUDENT IN AN ORGANIZED HEALTH CARE EDUCATION/TRAINING PROGRAM

## 2023-03-11 PROCEDURE — 700101 HCHG RX REV CODE 250: Performed by: STUDENT IN AN ORGANIZED HEALTH CARE EDUCATION/TRAINING PROGRAM

## 2023-03-11 PROCEDURE — 83605 ASSAY OF LACTIC ACID: CPT

## 2023-03-11 PROCEDURE — 93005 ELECTROCARDIOGRAM TRACING: CPT | Performed by: EMERGENCY MEDICINE

## 2023-03-11 PROCEDURE — 81003 URINALYSIS AUTO W/O SCOPE: CPT

## 2023-03-11 PROCEDURE — 74018 RADEX ABDOMEN 1 VIEW: CPT

## 2023-03-11 PROCEDURE — 700105 HCHG RX REV CODE 258: Performed by: EMERGENCY MEDICINE

## 2023-03-11 PROCEDURE — 70450 CT HEAD/BRAIN W/O DYE: CPT

## 2023-03-11 PROCEDURE — 80307 DRUG TEST PRSMV CHEM ANLYZR: CPT

## 2023-03-11 PROCEDURE — 84443 ASSAY THYROID STIM HORMONE: CPT

## 2023-03-11 PROCEDURE — 96365 THER/PROPH/DIAG IV INF INIT: CPT

## 2023-03-11 PROCEDURE — 87641 MR-STAPH DNA AMP PROBE: CPT

## 2023-03-11 PROCEDURE — 99253 IP/OBS CNSLTJ NEW/EST LOW 45: CPT | Performed by: SURGERY

## 2023-03-11 PROCEDURE — 700105 HCHG RX REV CODE 258: Performed by: INTERNAL MEDICINE

## 2023-03-11 PROCEDURE — A9270 NON-COVERED ITEM OR SERVICE: HCPCS | Performed by: INTERNAL MEDICINE

## 2023-03-11 PROCEDURE — 700102 HCHG RX REV CODE 250 W/ 637 OVERRIDE(OP): Performed by: INTERNAL MEDICINE

## 2023-03-11 PROCEDURE — 99291 CRITICAL CARE FIRST HOUR: CPT

## 2023-03-11 PROCEDURE — 84295 ASSAY OF SERUM SODIUM: CPT

## 2023-03-11 PROCEDURE — 85025 COMPLETE CBC W/AUTO DIFF WBC: CPT

## 2023-03-11 PROCEDURE — 700111 HCHG RX REV CODE 636 W/ 250 OVERRIDE (IP): Performed by: INTERNAL MEDICINE

## 2023-03-11 PROCEDURE — 96367 TX/PROPH/DG ADDL SEQ IV INF: CPT

## 2023-03-11 PROCEDURE — 96366 THER/PROPH/DIAG IV INF ADDON: CPT

## 2023-03-11 PROCEDURE — 770022 HCHG ROOM/CARE - ICU (200)

## 2023-03-11 PROCEDURE — 99292 CRITICAL CARE ADDL 30 MIN: CPT | Performed by: INTERNAL MEDICINE

## 2023-03-11 PROCEDURE — 85610 PROTHROMBIN TIME: CPT

## 2023-03-11 PROCEDURE — 72125 CT NECK SPINE W/O DYE: CPT

## 2023-03-11 PROCEDURE — 96368 THER/DIAG CONCURRENT INF: CPT

## 2023-03-11 PROCEDURE — 99291 CRITICAL CARE FIRST HOUR: CPT | Performed by: INTERNAL MEDICINE

## 2023-03-11 PROCEDURE — 85347 COAGULATION TIME ACTIVATED: CPT

## 2023-03-11 PROCEDURE — 80053 COMPREHEN METABOLIC PANEL: CPT

## 2023-03-11 PROCEDURE — 87086 URINE CULTURE/COLONY COUNT: CPT

## 2023-03-11 PROCEDURE — 96372 THER/PROPH/DIAG INJ SC/IM: CPT

## 2023-03-11 PROCEDURE — 84100 ASSAY OF PHOSPHORUS: CPT

## 2023-03-11 PROCEDURE — 72126 CT NECK SPINE W/DYE: CPT

## 2023-03-11 PROCEDURE — 71045 X-RAY EXAM CHEST 1 VIEW: CPT

## 2023-03-11 PROCEDURE — 85384 FIBRINOGEN ACTIVITY: CPT

## 2023-03-11 PROCEDURE — 700101 HCHG RX REV CODE 250: Performed by: INTERNAL MEDICINE

## 2023-03-11 PROCEDURE — 36415 COLL VENOUS BLD VENIPUNCTURE: CPT

## 2023-03-11 PROCEDURE — C9803 HOPD COVID-19 SPEC COLLECT: HCPCS | Performed by: EMERGENCY MEDICINE

## 2023-03-11 PROCEDURE — 96375 TX/PRO/DX INJ NEW DRUG ADDON: CPT

## 2023-03-11 PROCEDURE — 84439 ASSAY OF FREE THYROXINE: CPT

## 2023-03-11 PROCEDURE — 84145 PROCALCITONIN (PCT): CPT

## 2023-03-11 PROCEDURE — 700105 HCHG RX REV CODE 258: Performed by: STUDENT IN AN ORGANIZED HEALTH CARE EDUCATION/TRAINING PROGRAM

## 2023-03-11 PROCEDURE — 82077 ASSAY SPEC XCP UR&BREATH IA: CPT

## 2023-03-11 PROCEDURE — 87040 BLOOD CULTURE FOR BACTERIA: CPT | Mod: 91

## 2023-03-11 RX ORDER — PROCHLORPERAZINE EDISYLATE 5 MG/ML
5-10 INJECTION INTRAMUSCULAR; INTRAVENOUS EVERY 4 HOURS PRN
Status: DISCONTINUED | OUTPATIENT
Start: 2023-03-11 | End: 2023-03-11

## 2023-03-11 RX ORDER — LORAZEPAM 2 MG/ML
4 INJECTION INTRAMUSCULAR
Status: DISCONTINUED | OUTPATIENT
Start: 2023-03-11 | End: 2023-03-15

## 2023-03-11 RX ORDER — SODIUM CHLORIDE, SODIUM LACTATE, POTASSIUM CHLORIDE, CALCIUM CHLORIDE 600; 310; 30; 20 MG/100ML; MG/100ML; MG/100ML; MG/100ML
INJECTION, SOLUTION INTRAVENOUS CONTINUOUS
Status: DISCONTINUED | OUTPATIENT
Start: 2023-03-11 | End: 2023-03-11

## 2023-03-11 RX ORDER — ONDANSETRON 4 MG/1
4 TABLET, ORALLY DISINTEGRATING ORAL EVERY 4 HOURS PRN
Status: DISCONTINUED | OUTPATIENT
Start: 2023-03-11 | End: 2023-03-11

## 2023-03-11 RX ORDER — SODIUM CHLORIDE, SODIUM LACTATE, POTASSIUM CHLORIDE, CALCIUM CHLORIDE 600; 310; 30; 20 MG/100ML; MG/100ML; MG/100ML; MG/100ML
1000 INJECTION, SOLUTION INTRAVENOUS ONCE
Status: COMPLETED | OUTPATIENT
Start: 2023-03-11 | End: 2023-03-11

## 2023-03-11 RX ORDER — DEXMEDETOMIDINE HYDROCHLORIDE 4 UG/ML
.1-1 INJECTION, SOLUTION INTRAVENOUS CONTINUOUS
Status: DISCONTINUED | OUTPATIENT
Start: 2023-03-11 | End: 2023-03-12

## 2023-03-11 RX ORDER — HALOPERIDOL 5 MG/ML
5 INJECTION INTRAMUSCULAR ONCE
Status: DISCONTINUED | OUTPATIENT
Start: 2023-03-11 | End: 2023-03-11

## 2023-03-11 RX ORDER — SODIUM CHLORIDE 9 MG/ML
INJECTION, SOLUTION INTRAVENOUS CONTINUOUS
Status: DISCONTINUED | OUTPATIENT
Start: 2023-03-11 | End: 2023-03-12

## 2023-03-11 RX ORDER — MAGNESIUM SULFATE HEPTAHYDRATE 40 MG/ML
2 INJECTION, SOLUTION INTRAVENOUS ONCE
Status: COMPLETED | OUTPATIENT
Start: 2023-03-11 | End: 2023-03-11

## 2023-03-11 RX ORDER — ONDANSETRON 2 MG/ML
4 INJECTION INTRAMUSCULAR; INTRAVENOUS EVERY 4 HOURS PRN
Status: DISCONTINUED | OUTPATIENT
Start: 2023-03-11 | End: 2023-03-11

## 2023-03-11 RX ORDER — GAUZE BANDAGE 2" X 2"
100 BANDAGE TOPICAL DAILY
Status: DISCONTINUED | OUTPATIENT
Start: 2023-03-14 | End: 2023-03-16 | Stop reason: HOSPADM

## 2023-03-11 RX ORDER — HALOPERIDOL 5 MG/ML
5 INJECTION INTRAMUSCULAR ONCE
Status: COMPLETED | OUTPATIENT
Start: 2023-03-11 | End: 2023-03-11

## 2023-03-11 RX ORDER — LORAZEPAM 2 MG/ML
1-2 INJECTION INTRAMUSCULAR
Status: DISCONTINUED | OUTPATIENT
Start: 2023-03-11 | End: 2023-03-13

## 2023-03-11 RX ORDER — HYDRALAZINE HYDROCHLORIDE 20 MG/ML
20 INJECTION INTRAMUSCULAR; INTRAVENOUS EVERY 6 HOURS PRN
Status: DISCONTINUED | OUTPATIENT
Start: 2023-03-11 | End: 2023-03-16 | Stop reason: HOSPADM

## 2023-03-11 RX ORDER — ONDANSETRON 2 MG/ML
4 INJECTION INTRAMUSCULAR; INTRAVENOUS EVERY 4 HOURS PRN
Status: DISCONTINUED | OUTPATIENT
Start: 2023-03-11 | End: 2023-03-16 | Stop reason: HOSPADM

## 2023-03-11 RX ORDER — DIAZEPAM 5 MG/ML
10 INJECTION, SOLUTION INTRAMUSCULAR; INTRAVENOUS ONCE
Status: COMPLETED | OUTPATIENT
Start: 2023-03-11 | End: 2023-03-11

## 2023-03-11 RX ORDER — LABETALOL HYDROCHLORIDE 5 MG/ML
10-20 INJECTION, SOLUTION INTRAVENOUS EVERY 6 HOURS PRN
Status: DISCONTINUED | OUTPATIENT
Start: 2023-03-11 | End: 2023-03-16 | Stop reason: HOSPADM

## 2023-03-11 RX ORDER — ONDANSETRON 4 MG/1
4 TABLET, ORALLY DISINTEGRATING ORAL EVERY 4 HOURS PRN
Status: DISCONTINUED | OUTPATIENT
Start: 2023-03-11 | End: 2023-03-16 | Stop reason: HOSPADM

## 2023-03-11 RX ORDER — LORAZEPAM 2 MG/ML
2 INJECTION INTRAMUSCULAR EVERY 4 HOURS
Status: DISCONTINUED | OUTPATIENT
Start: 2023-03-11 | End: 2023-03-11

## 2023-03-11 RX ORDER — PROMETHAZINE HYDROCHLORIDE 25 MG/1
12.5-25 SUPPOSITORY RECTAL EVERY 4 HOURS PRN
Status: DISCONTINUED | OUTPATIENT
Start: 2023-03-11 | End: 2023-03-11

## 2023-03-11 RX ORDER — PROMETHAZINE HYDROCHLORIDE 25 MG/1
12.5-25 TABLET ORAL EVERY 4 HOURS PRN
Status: DISCONTINUED | OUTPATIENT
Start: 2023-03-11 | End: 2023-03-11

## 2023-03-11 RX ORDER — ACETAMINOPHEN 325 MG/1
650 TABLET ORAL EVERY 4 HOURS PRN
Status: DISCONTINUED | OUTPATIENT
Start: 2023-03-11 | End: 2023-03-12

## 2023-03-11 RX ORDER — ACETAMINOPHEN 650 MG/1
650 SUPPOSITORY RECTAL EVERY 4 HOURS PRN
Status: DISCONTINUED | OUTPATIENT
Start: 2023-03-11 | End: 2023-03-12

## 2023-03-11 RX ADMIN — HALOPERIDOL LACTATE 5 MG: 5 INJECTION, SOLUTION INTRAMUSCULAR at 03:25

## 2023-03-11 RX ADMIN — THIAMINE HYDROCHLORIDE 500 MG: 100 INJECTION, SOLUTION INTRAMUSCULAR; INTRAVENOUS at 08:26

## 2023-03-11 RX ADMIN — SODIUM CHLORIDE, POTASSIUM CHLORIDE, SODIUM LACTATE AND CALCIUM CHLORIDE 1000 ML: 600; 310; 30; 20 INJECTION, SOLUTION INTRAVENOUS at 04:23

## 2023-03-11 RX ADMIN — LORAZEPAM 2 MG: 2 INJECTION INTRAMUSCULAR; INTRAVENOUS at 08:53

## 2023-03-11 RX ADMIN — PIPERACILLIN AND TAZOBACTAM 4.5 G: 4; .5 INJECTION, POWDER, LYOPHILIZED, FOR SOLUTION INTRAVENOUS; PARENTERAL at 12:32

## 2023-03-11 RX ADMIN — SODIUM CHLORIDE: 9 INJECTION, SOLUTION INTRAVENOUS at 08:31

## 2023-03-11 RX ADMIN — HALOPERIDOL LACTATE 5 MG: 5 INJECTION, SOLUTION INTRAMUSCULAR at 04:05

## 2023-03-11 RX ADMIN — SODIUM CHLORIDE: 9 INJECTION, SOLUTION INTRAVENOUS at 22:11

## 2023-03-11 RX ADMIN — VANCOMYCIN HYDROCHLORIDE 1750 MG: 500 INJECTION, POWDER, LYOPHILIZED, FOR SOLUTION INTRAVENOUS at 05:47

## 2023-03-11 RX ADMIN — DEXMEDETOMIDINE 0.2 MCG/KG/HR: 200 INJECTION, SOLUTION INTRAVENOUS at 09:28

## 2023-03-11 RX ADMIN — POTASSIUM PHOSPHATE, MONOBASIC AND POTASSIUM PHOSPHATE, DIBASIC 30 MMOL: 224; 236 INJECTION, SOLUTION, CONCENTRATE INTRAVENOUS at 12:26

## 2023-03-11 RX ADMIN — AMPICILLIN AND SULBACTAM 3 G: 1; 2 INJECTION, POWDER, FOR SOLUTION INTRAMUSCULAR; INTRAVENOUS at 17:55

## 2023-03-11 RX ADMIN — DIAZEPAM 10 MG: 5 INJECTION, SOLUTION INTRAMUSCULAR; INTRAVENOUS at 04:19

## 2023-03-11 RX ADMIN — ACETAMINOPHEN 650 MG: 650 SUPPOSITORY RECTAL at 13:51

## 2023-03-11 RX ADMIN — MAGNESIUM SULFATE HEPTAHYDRATE 2 G: 40 INJECTION, SOLUTION INTRAVENOUS at 12:31

## 2023-03-11 RX ADMIN — PIPERACILLIN AND TAZOBACTAM 4.5 G: 4; .5 INJECTION, POWDER, LYOPHILIZED, FOR SOLUTION INTRAVENOUS; PARENTERAL at 05:27

## 2023-03-11 ASSESSMENT — PAIN DESCRIPTION - PAIN TYPE
TYPE: ACUTE PAIN

## 2023-03-11 ASSESSMENT — LIFESTYLE VARIABLES
DOES PATIENT WANT TO STOP DRINKING: CANNOT ASSESS
ALCOHOL_USE: YES

## 2023-03-11 ASSESSMENT — FIBROSIS 4 INDEX
FIB4 SCORE: 0.93
FIB4 SCORE: 1.03
FIB4 SCORE: 0.93

## 2023-03-11 NOTE — PROGRESS NOTES
"      Mental status adequate for full examination?: Yes    Spine cleared (radiologically and/or clinically): Yes    REVIEW OF SYSTEMS:  Review of Systems   Unable to perform ROS: Acuity of condition     PHYSICAL EXAMINATION:  /74   Pulse 89   Temp 37.5 °C (99.5 °F) (Temporal)   Resp (!) 21   Ht 1.803 m (5' 11\")   Wt 67.4 kg (148 lb 9.4 oz)   SpO2 95%   BMI 20.72 kg/m²     Physical Exam  Constitutional:       General: He is not in acute distress.     Interventions: He is sedated.   HENT:      Head: Normocephalic and atraumatic.      Right Ear: External ear normal.      Left Ear: External ear normal.      Nose: Nose normal.      Mouth/Throat:      Mouth: Mucous membranes are dry.      Pharynx: Oropharynx is clear.   Eyes:      Pupils: Pupils are equal, round, and reactive to light.   Cardiovascular:      Rate and Rhythm: Regular rhythm. Tachycardia present.   Pulmonary:      Effort: No respiratory distress.      Breath sounds: Examination of the right-upper field reveals decreased breath sounds. Examination of the left-upper field reveals decreased breath sounds. Decreased breath sounds present. No wheezing.   Abdominal:      General: Abdomen is flat. Bowel sounds are normal. There is no distension.      Palpations: Abdomen is soft. There is no mass.   Genitourinary:     Comments: Wing intact with clear yellow urine  Musculoskeletal:         General: No swelling or deformity.      Right lower leg: No edema.      Left lower leg: No edema.   Skin:     General: Skin is warm and dry.      Capillary Refill: Capillary refill takes less than 2 seconds.   Neurological:      GCS: GCS eye subscore is 3. GCS verbal subscore is 2. GCS motor subscore is 5.       LABORATORY VALUES:  Recent Labs     03/11/23 0420   WBC 20.0*   RBC 4.62*   HEMOGLOBIN 15.5   HEMATOCRIT 45.0   MCV 97.4   MCH 33.5*   MCHC 34.4   RDW 47.8   PLATELETCT 307   MPV 9.1     Recent Labs     03/11/23  0420 03/11/23  0951   SODIUM 131* 132* "   POTASSIUM 3.8  --    CHLORIDE 98  --    CO2 22  --    GLUCOSE 101*  --    BUN 10  --    CREATININE 0.55  --    CALCIUM 9.0  --      Recent Labs     03/11/23  0420   ASTSGOT 19   ALTSGPT 16   TBILIRUBIN 0.6   ALKPHOSPHAT 108*   GLOBULIN 3.4   INR 1.01     Recent Labs     03/11/23  0420   INR 1.01       IMAGING:  CT-HEAD W/O   Final Result      1.  No significant interval change in small left frontal parenchymal hemorrhages measuring up to 5 mm.      2.  Persistent prominence of the ventricles could be related to central atrophy versus normal pressure hydrocephalus.         CT-CSPINE WITH PLUS RECONS   Final Result         1.  Multilevel degenerative changes of the cervical spine limit diagnostic sensitivity of this examination, otherwise no acute traumatic bony injury of the cervical spine is apparent.   2.  Atherosclerosis      CT-HEAD W/O   Final Result         1.  Multiple small left frontal parenchymal hemorrhages measuring up to 4.6 mm.   2.  Moderate bilateral ventricular dilatation, may represent ex vacuo changes, consider component of normal pressure hydrocephalus as clinically appropriate. Appears overall stable since prior study.   3.  Nonspecific white matter changes, commonly associated with small vessel ischemic disease.  Associated mild cerebral atrophy is noted.   4.  Mild bilateral and frontal and ethmoid sinusitis changes   5.  Atherosclerosis.      Based solely on CT findings, the brain injury guideline category is mBIG 3.      EDH   IVH   Displaced skull fx   SDH > 8mm   IPH > 8mm or multiple   SAH bi-hemispheric or > 3mm      The original BIG retrospective analysis found radiographic progression in 0% of BIG 1 patients and 2.6% BIG 2.      These findings were discussed with the patient's clinician, Arjun Ramos, on 3/11/2023 5:21 AM.      DX-CHEST-PORTABLE (1 VIEW)   Final Result         1.  No acute cardiopulmonary disease.   2.  Atherosclerosis      MR-BRAIN-W/O    (Results Pending)        All current laboratory studies/radiology exams reviewed: Yes    Completed Consultations:  Dr. Erwin Swain MD, Neurosurgery     Pending Consultations:  None    Newly Identified Diagnoses and Injuries:  None noted at time of exam.    RAP Score Total: 6    CAGE Results: not completed Blood Alcohol>0.08: no     Discussed patient condition with RN and trauma surgery, Dr. Mcclain

## 2023-03-11 NOTE — ASSESSMENT & PLAN NOTE
Multiple small left frontal parenchymal hemorrhages measuring up to 4.6 mm.  Non-operative management.  Erwin Swain MD. Neurosurgeon. [facility].

## 2023-03-11 NOTE — H&P
CRITICAL CARE MEDICINE ATTENDING HISTORY AND PHYSICAL    Date of admission  3/11/2023    Chief Complaint  62 y.o. male admitted 3/11/2023 with seizure and traumatic intracerebral hemorrhage.    History of Present Illness      The entire history is obtained from healthcare providers and the medical record as this gentleman cannot provide me with any history.  This is a 62-year-old gentleman with history of alcohol abuse, cannabis abuse, hyperthyroidism and normal pressure hydrocephalus.  This gentleman was brought in by EMS to the ED this morning after he had a 1 minute witnessed seizure.  He whacked the left front of his head.  A CT of the head reveals multiple small left frontal parenchymal hemorrhages.  A CT of the cervical spine does not reveal evidence of fracture or subluxation.  He was appropriately evaluated by the trauma service, but they would like the medical intensivist team to admit and care for him.  It is my pleasure to do so.  Dr. Erwin Swain is kindly consulting from neurosurgery.      Review of Systems  Review of Systems   Unable to perform ROS: Acuity of condition     Vital Signs for the last 24 hours  Temp:  [37.3 °C (99.1 °F)-38.7 °C (101.6 °F)] 37.5 °C (99.5 °F)  Pulse:  [104-137] 104  Resp:  [25-35] 28  BP: (103-155)/(66-88) 103/66  SpO2:  [88 %-94 %] 91 %    Hemodynamic parameters for the last 24 hours       Vent Settings for the last 24 hours       Physical Exam  Physical Exam  Constitutional:       Comments: Disheveled, malnourished and unkempt gentleman   HENT:      Head:      Comments: Abrasion and contusion to his left forehead     Mouth/Throat:      Mouth: Mucous membranes are dry.   Eyes:      Pupils: Pupils are equal, round, and reactive to light.   Cardiovascular:      Comments: Sinus tachycardia  Pulmonary:      Breath sounds: No wheezing or rales.   Abdominal:      General: There is no distension.      Tenderness: There is no abdominal tenderness.   Musculoskeletal:      Cervical  back: Normal range of motion.      Right lower leg: No edema.      Left lower leg: No edema.   Skin:     General: Skin is warm.   Neurological:      Comments: Sedated.  Arouses.       Medications  Current Facility-Administered Medications   Medication Dose Route Frequency Provider Last Rate Last Admin    vancomycin (VANCOCIN) 1,750 mg in  mL IVPB  25 mg/kg Intravenous Once Arjun Ramos M.D. 166.7 mL/hr at 03/11/23 0547 1,750 mg at 03/11/23 0547    promethazine (PHENERGAN) tablet 12.5-25 mg  12.5-25 mg Oral Q4HRS PRN Arjun Callejas M.D.        promethazine (PHENERGAN) suppository 12.5-25 mg  12.5-25 mg Rectal Q4HRS PRN Arjun Callejas M.D.        prochlorperazine (COMPAZINE) injection 5-10 mg  5-10 mg Intravenous Q4HRS PRN Arjun Callejas M.D.        LORazepam (ATIVAN) injection 4 mg  4 mg Intravenous Q10 MIN PRN Arjun Callejas M.D.        Respiratory Therapy Consult   Nebulization Continuous RT Arjun Callejas M.D.        NS infusion   Intravenous Continuous Arjun Callejas M.D.        acetaminophen (Tylenol) tablet 650 mg  650 mg Oral Q4HRS PRN Arjun Callejas M.D.        Or    acetaminophen (TYLENOL) suppository 650 mg  650 mg Rectal Q4HRS PRN Arjun Callejas M.D.        ondansetron (ZOFRAN ODT) dispertab 4 mg  4 mg Oral Q4HRS PRN Arjun Callejas M.D.        Or    ondansetron (ZOFRAN) syringe/vial injection 4 mg  4 mg Intravenous Q4HRS PRN Arjun Callejas M.D. MD Alert...ICU Electrolyte Replacement per Pharmacy   Other PHARMACY TO DOSE Arjun Callejas M.D.        thiamine (B-1) 500 mg in dextrose 5% 100 mL IVPB  500 mg Intravenous DAILY Arjun Callejas M.D.        Followed by    [START ON 3/14/2023] thiamine (Vitamin B-1) tablet 100 mg  100 mg Oral DAILY Arjun Callejas M.D.        dexmedetomidine (PRECEDEX) 400 mcg/100mL NS premix infusion  0.1-1 mcg/kg/hr (Ideal) Intravenous Continuous Arjun Looney  DEANDRE Day        LORazepam (ATIVAN) injection 2 mg  2 mg Intravenous Q4HRS Arjun Callejas M.D.        LORazepam (ATIVAN) injection 1-2 mg  1-2 mg Intravenous Q2HRS PRN Arjun Callejas M.D.        piperacillin-tazobactam (Zosyn) 4.5 g in  mL IVPB  4.5 g Intravenous Once Arjun Callejas M.D.        And    piperacillin-tazobactam (Zosyn) 4.5 g in  mL IVPB  4.5 g Intravenous Q8HRS Arjun Callejas M.D.        MD Alert...Vancomycin per Pharmacy   Other PHARMACY TO DOSE Arjun Callejas M.D.         Current Outpatient Medications   Medication Sig Dispense Refill    Cyanocobalamin (B-12) 1000 MCG Tab CR Take 1 tablet by mouth every day. 30 Tablet 1    albuterol 108 (90 Base) MCG/ACT Aero Soln inhalation aerosol Inhale 2 Puffs every 6 hours as needed for Shortness of Breath. 18 g 1       Fluids  No intake or output data in the 24 hours ending 03/11/23 0709    Laboratory      Recent Labs     03/11/23 0420   SODIUM 131*   POTASSIUM 3.8   CHLORIDE 98   CO2 22   BUN 10   CREATININE 0.55   CALCIUM 9.0     Recent Labs     03/11/23 0420   ALTSGPT 16   ASTSGOT 19   ALKPHOSPHAT 108*   TBILIRUBIN 0.6   GLUCOSE 101*     Recent Labs     03/11/23 0420   WBC 20.0*   NEUTSPOLYS 90.10*   LYMPHOCYTES 2.60*   MONOCYTES 6.20   EOSINOPHILS 0.00   BASOPHILS 0.20   ASTSGOT 19   ALTSGPT 16   ALKPHOSPHAT 108*   TBILIRUBIN 0.6     Recent Labs     03/11/23  0420   RBC 4.62*   HEMOGLOBIN 15.5   HEMATOCRIT 45.0   PLATELETCT 307       Imaging  CT:    Head CT images personally reviewed.    Assessment/Plan      Acute traumatic intracerebral hemorrhage due to seizure with whack to the head   Dr. Erwin Swain is kindly consulting   Seizure precautions   Thromboelastogram with platelet mapping   Neuro checks every hour    Seizures   I suspect secondary to alcohol withdrawal as his BA is negative and he is a dedicated drinker   Seizure precautions   IV lorazepam    Alcohol dependence   Blood alcohol  negative   High-dose IV thiamine and supplemental vitamins   I am titrating dexmedetomidine and administering intravenous lorazepam based upon serial RASS assessments    Suspect sepsis   He has leukocytosis and fever which may be due to his seizure, but for the better part of valor, I will assume he is septic   Culture blood and urine   He has received the appropriate IV fluid bolus of 30 mL/kg   IV fluid resuscitation   Trend lactic acid   Begin empiric Zosyn and vancomycin pending culture results    History of hyperthyroidism   Previously on methimazole   Check TSH and free T4    History of normal pressure hydrocephalus      VTE:  Contraindicated  Ulcer: Not Indicated  Lines: Wing Catheter  Ongoing indication addressed    I have performed a physical exam and reviewed and updated ROS and Plan today (3/11/2023). In review of yesterday's note (3/10/2023), there are no changes except as documented above.     I have assessed and reassessed his blood pressure, hemodynamics, cardiovascular status, respiratory status and neurologic status.  He is at increased risk for worsening respiratory, cardiovascular and CNS system dysfunction.    Discussed patient condition and risk of morbidity and/or mortality with RN and ER physician    The patient remains critically ill.  Critical care time = 105 minutes in directly providing and coordinating critical care and extensive data review.  No time overlap and excludes procedures.    A Critical Care Medicine progress note may have been authored by a resident physician or advanced practitioner of nursing under my direct supervision on this date of service.  As the supervising and attending physician, I have either attested to or cosigned that document.  IN THE EVENT THAT DISCREPANCIES EXIST BETWEEN THIS DOCUMENT AND ANY DOCUMENT THAT I HAVE ATTESTED TO OR COSIGNED ON THIS DATE OF SERVICE, THEN THIS DOCUMENT REMAINS THE FINAL AUTHORITY AS TO MY ASSESSMENT AND PLAN REGARDING THE CARE OF  THIS PATIENT.    Arjun Callejas MD  Pulmonary and Critical Care Medicine

## 2023-03-11 NOTE — CONSULTS
"    DATE OF CONSULTATION:  3/11/2023     REFERRING PHYSICIAN:   Arjun Ramos M.D.     CONSULTING PHYSICIAN:  Amari Mcclain M.D.     REASON FOR CONSULTATION:  I have been asked by Dr. Ramos to see the patient in surgical consultation for evaluation of a minor suspected intracranial hemorrhage.    HISTORY OF PRESENT ILLNESS: The patient is a 62 year-old man who was transported to the hospital by ambulance after an alleged seizure.  He believes that he fell and hit his head.  Additional verifiable history is not forthcoming.  CT imaging in the emergency department demonstrated suspicion for some focal punctate left frontal hemorrhages.  Neurosurgical and trauma consultation were requested.      TRIAGE CATEGORY: The patient was triaged as a Non Trauma Activation. An expeditious primary and secondary survey with required adjuncts was conducted. See Trauma Narrator for full details.    PAST MEDICAL HISTORY:  has a past medical history of Hypothyroid.    PAST SURGICAL HISTORY:  has no past surgical history on file.    ALLERGIES: No Known Allergies    CURRENT MEDICATIONS:   Home Medications       Reviewed by Juany Burton (Pharmacy Tech) on 03/11/23 at 0622  Med List Status: Unable to Obtain     Medication Last Dose Status   albuterol 108 (90 Base) MCG/ACT Aero Soln inhalation aerosol  Active   Cyanocobalamin (B-12) 1000 MCG Tab CR  Active                FAMILY HISTORY: family history is not on file.    SOCIAL HISTORY:  reports that he has been smoking. He uses smokeless tobacco. He reports current alcohol use. He reports that he does not currently use drugs.    REVIEW OF SYSTEMS: Comprehensive review of systems is not able to be elicited from the patient secondary to the acuity of the clinical situation.    PHYSICAL EXAMINATION:      Vital Signs: /65   Pulse (!) 103   Temp 37.5 °C (99.5 °F) (Temporal)   Resp (!) 22   Ht 1.803 m (5' 11\")   Wt 67.4 kg (148 lb 9.4 oz)   SpO2 94%   Physical " Exam  Vitals and nursing note reviewed.   Constitutional:       General: He is not in acute distress.     Comments: Somnolent and just given versed and haldol   HENT:      Head: Normocephalic.      Comments: 4 cm abrasion on left forehead     Right Ear: External ear normal. There is impacted cerumen.      Left Ear: External ear normal. There is impacted cerumen.      Ears:      Comments: No evidence of blood or fluid from ear canals     Nose: Nose normal.      Comments: No evidence of trauma or epistaxis     Mouth/Throat:      Mouth: Mucous membranes are dry.   Eyes:      General: No scleral icterus.     Conjunctiva/sclera: Conjunctivae normal.      Pupils: Pupils are equal, round, and reactive to light.   Cardiovascular:      Rate and Rhythm: Regular rhythm. Tachycardia present.      Pulses: Normal pulses.      Heart sounds: Normal heart sounds.   Pulmonary:      Effort: Pulmonary effort is normal.      Breath sounds: Normal breath sounds. No wheezing or rales.      Comments: No chest tenderness  Abdominal:      General: Abdomen is flat. There is no distension.      Palpations: Abdomen is soft.      Tenderness: There is no guarding.   Musculoskeletal:         General: No swelling, tenderness, deformity or signs of injury. Normal range of motion.      Cervical back: Normal range of motion and neck supple. No tenderness.      Comments: No obvious cervical tenderness   Skin:     General: Skin is warm.      Coloration: Skin is not jaundiced.      Findings: No erythema.      Comments: Right foot with pulses but noticeably colder compared to left foot, multiple small excoriations bilaterally on lower legs and tiny scabs noted on right hand   Neurological:      Mental Status: He is lethargic.      GCS: GCS eye subscore is 2. GCS verbal subscore is 4. GCS motor subscore is 5.      Comments: Difficult to assess due to sedation, prior to sedation GCS 14 and aggressive and combative   Psychiatric:      Comments: sedated        LABORATORY VALUES:   Recent Labs     03/11/23 0420   WBC 20.0*   RBC 4.62*   HEMOGLOBIN 15.5   HEMATOCRIT 45.0   MCV 97.4   MCH 33.5*   MCHC 34.4   RDW 47.8   PLATELETCT 307   MPV 9.1     Recent Labs     03/11/23 0420   SODIUM 131*   POTASSIUM 3.8   CHLORIDE 98   CO2 22   GLUCOSE 101*   BUN 10   CREATININE 0.55   CALCIUM 9.0     Recent Labs     03/11/23 0420   ASTSGOT 19   ALTSGPT 16   TBILIRUBIN 0.6   ALKPHOSPHAT 108*   GLOBULIN 3.4   INR 1.01     IMAGING:   CT-CSPINE WITH PLUS RECONS   Final Result         1.  Multilevel degenerative changes of the cervical spine limit diagnostic sensitivity of this examination, otherwise no acute traumatic bony injury of the cervical spine is apparent.   2.  Atherosclerosis      CT-HEAD W/O   Final Result         1.  Multiple small left frontal parenchymal hemorrhages measuring up to 4.6 mm.   2.  Moderate bilateral ventricular dilatation, may represent ex vacuo changes, consider component of normal pressure hydrocephalus as clinically appropriate. Appears overall stable since prior study.   3.  Nonspecific white matter changes, commonly associated with small vessel ischemic disease.  Associated mild cerebral atrophy is noted.   4.  Mild bilateral and frontal and ethmoid sinusitis changes   5.  Atherosclerosis.      Based solely on CT findings, the brain injury guideline category is mBIG 3.      EDH   IVH   Displaced skull fx   SDH > 8mm   IPH > 8mm or multiple   SAH bi-hemispheric or > 3mm      The original BIG retrospective analysis found radiographic progression in 0% of BIG 1 patients and 2.6% BIG 2.      These findings were discussed with the patient's clinician, Arjun Ramos, on 3/11/2023 5:21 AM.      DX-CHEST-PORTABLE (1 VIEW)   Final Result         1.  No acute cardiopulmonary disease.   2.  Atherosclerosis      CT-HEAD W/O    (Results Pending)       ASSESSMENT AND PLAN:     Minor, multifocal left frontal parenchymal hemorrhages without mass effect.  No  evidence for other acute significant traumatic injury.  Repeat CT imaging of the brain in 6 hours to assess for progression.  Thromboelastography pending.  Additional medical work up and seizure management will be deferred to the treating medical team.    DISPOSITION: Medical evaluation and admission.  Trauma tertiary survey.         ____________________________________     Amari Mcclain M.D.    DD: 3/11/2023  5:55 AM

## 2023-03-11 NOTE — ED NOTES
Pt incontinent of urine and feces. All pt clothing wet. All clothing removed and placed in belongings bags. Pt placed on clean linens, with clean gown, provided socks and warm blankets.    Pt SPO2 noted to be in the 80s, placed on 4LPM    Pt remains unable to answer questions and has difficulty following commands. Pt attempting to reach out and grab at staff while cleaning.

## 2023-03-11 NOTE — ED NOTES
Unable to obtain med rec at this time  Patient is unable to stay awake for interview  Contacted [facility], patients listed pharmacy, and they state patient has not picked up any medications since June of last year

## 2023-03-11 NOTE — CONSULTS
DATE OF SERVICE:  03/11/2023     CHIEF COMPLAINT:  Traumatic closed head injury and seizure.     HISTORY OF PRESENT ILLNESS:  A 62-year-old gentleman who has a history of   alcohol abuse, hypothyroidism, was found to have a seizure at [facility]   where we whacked the back of his head.  He was brought in by EMS and a CT   examination revealed some small left frontal parenchymal hemorrhages.  He has   been alert, but confused.  He has been given some sedatives and at times he   was to be combative.     This is a gentleman who has been in and out of the hospital and his CT and MRI   examinations have showed marked ventricular dilatation.  The thought of   shunting him has come up in the past, and he did have a spinal tap back in   07/2022, after which he improved.  His big problem has been balance, but he   has also had obviously some cognitive deficits and he has a history of urinary   incontinence.     MEDICATIONS:  None.     SOCIAL HISTORY:  History of alcoholism, but he states he has not had anything   to drink for the last week and his alcohol level today was unremarkable.     PAST MEDICAL HISTORY:  Hypothyroid.     CURRENT MEDICATIONS:  None.     PHYSICAL EXAMINATION:    GENERAL:  This gentleman is quite lethargic.  No acute distress.  HEENT:  Has some mild swelling to the back of his head and abrasion on his   left forehead.  EYES:  No scleral icterus.  HEART:  Regular rate and rhythm with tachycardia.  PULMONARY:  Bilateral rales, rhonchi.  ABDOMEN:  Flat, nontender.  SKIN:  Warm, not jaundiced.  NEUROLOGIC:  Lethargic, oriented to person.  Opens eyes to voice, follows   commands x4.  Neil coma score 14.     DIAGNOSTIC DATA:  CT examination of the head shows multiple small frontal   parenchymal hemorrhages measuring up to 4.6 mm.  He has bilateral ventricular   dilatation and mild bilateral frontal ethmoid sinusitis.     IMPRESSION AND PLAN:  I think this gentleman has been admitted with possible   sepsis  and infection and this obviously should be treated, but he also may   have longstanding normal pressure hydrocephalus, which may be treated with   shunting.  Obviously his infection need to be cleared up and followup I think   has been the issue.  He was supposed to follow up with me last year, but   unfortunately did not show up in the clinic, so we did not get him scheduled   for surgery.  We will try to arrange this, so we can get him treated possibly   with another LP shunt.  A second spinal tap being performed and if he does   improve with that maybe a ventriculoperitoneal shunt might be beneficial   certainly not at this point in time.  Appreciate medicine working him up, and   we would have to be prepared for withdrawals and again his infection.        ______________________________  LUIS ANTONIO MATHIS MD    JKM/NIT    DD:  03/11/2023 07:37  DT:  03/11/2023 08:51    Job#:  [id number]

## 2023-03-11 NOTE — ED NOTES
PIV started and pt medicated/fluids started. Blood collected and tubed to lab    Pt placed in clean gown with clean linens.    EKG completed.

## 2023-03-11 NOTE — PROGRESS NOTES
4 Eyes Skin Assessment Completed by HARSHAL brown and HARSHAL Medrano.    Head Bruising, Swelling, and Redness    Ears Redness  Nose WDL  Mouth WDL  Neck Redness, Scab, and Discoloration  Breast/Chest Redness, Bruising, and Discoloration  Shoulder Blades Redness and Blanching  Spine WDL  (R) Arm/Elbow/Hand Redness, Bruising, Abrasion, Scab, and Swelling      (L) Arm/Elbow/Hand Redness, Bruising, Abrasion, Scab, and Swelling    Abdomen WDL  Groin WDL  Scrotum/Coccyx/Buttocks Redness and Blanching  (R) Leg Redness and Scab    (L) Leg Redness and Scab    (R) Heel/Foot/Toe Redness, Blanching, Swelling, and Scab    (L) Heel/Foot/Toe Redness, Blanching, Bruising, Swelling, and Scab            Devices In Places Pulse Ox, Wing, SCD's, and Nasal Cannula      Interventions In Place Gray Ear Foams, Heel Mepilex, Pillows, Elbow Mepilex, and Heels Loaded W/Pillows    Possible Skin Injury Yes, existing skin injuries unrelated to pressure, r/t falls pta    Pictures Uploaded Into Epic Yes  Wound Consult Placed N/A  RN Wound Prevention Protocol Ordered No

## 2023-03-11 NOTE — DISCHARGE PLANNING
[facility] Transitional [facility] Coordination    Referral from: Dr Callejas   Insurance Provider on Facesheet: [place] [employer]  Potential Rehab Diagnosis: TBI    Chart review indicates patient may have on going medical management and may have therapy needs to possibly meet inpatient rehab facility criteria with the goal of returning to community.    D/C support: TBD     Physiatry consultation pended per protocol.     TBI - physiatry consult pended, awaiting therapy evals as appropriate. TCC will follow.     Thank you for the referral.

## 2023-03-11 NOTE — ED NOTES
ERP notifeid that pt is still agitated, pulling at monitoring equipment and grabbing at staff when attempting intervention.    Will place additional orders for medication

## 2023-03-11 NOTE — PROGRESS NOTES
Patient seen and examined   Minor frontal contusions. Nothing surgical.  Has possible normal pressure hydrocephalus and may respond to shunt. Currently being treated for sepsis so no surgical procedures considered at this point.   Full note dictated.

## 2023-03-11 NOTE — ED NOTES
Pt medicated with haldol to promote compliance with PIV start, blood draw, further medication admin. Will wait to assess when pt is calm/cooperative.

## 2023-03-11 NOTE — ED TRIAGE NOTES
Pt BIBA via [facility]    Chief Complaint   Patient presents with    Seizure     Pt had about 1 minute witnessed seizure. Pt did hit L front of head.        ABCs intact. Pt alert and oriented to self, uncooperative with remainder of questioning. Follows commands. Skin PWD. Vitals on the monitor

## 2023-03-11 NOTE — ED PROVIDER NOTES
"  ER Provider Note    Scribed for Arjun Ramos M.D. by Oniel Good. 3/11/2023  2:53 AM    Primary Care Provider: Pcp Pt States None    CHIEF COMPLAINT  Chief Complaint   Patient presents with    Seizure     Pt had about 1 minute witnessed seizure. Pt did hit L front of head.      EXTERNAL RECORDS REVIEWED  Inpatient Notes Admitted July 2022 for sepsis    HPI/ROS    LIMITATION TO HISTORY   Select: Altered mental status / Confusion    James Samaniego is a 62 y.o. male who presents to the ED via EMS complaining of seizure onset tonight. He was seen having a seizure that lasted approximately 1 minute. It is reported that he hit the left side of his head during the seizure. He states that he does not think he had a seizure. He endorses falling and hitting his head. He is not able to elicit if he drank any alcohol today.    PAST MEDICAL HISTORY  Past Medical History:   Diagnosis Date    Hypothyroid        SURGICAL HISTORY  History reviewed. No pertinent surgical history.    FAMILY HISTORY  None noted    SOCIAL HISTORY   reports that he has been smoking. He uses smokeless tobacco. He reports current alcohol use. He reports that he does not currently use drugs.    CURRENT MEDICATIONS  Previous Medications    ALBUTEROL 108 (90 BASE) MCG/ACT AERO SOLN INHALATION AEROSOL    Inhale 2 Puffs every 6 hours as needed for Shortness of Breath.    CYANOCOBALAMIN (B-12) 1000 MCG TAB CR    Take 1 tablet by mouth every day.       ALLERGIES  Patient has no known allergies.    PHYSICAL EXAM  Ht 1.803 m (5' 11\")   Wt 67.4 kg (148 lb 9.4 oz)   BMI 20.72 kg/m²   Constitutional: Alert in no apparent distress. Mumbling, does respond to some questions. Slightly tremulous  HENT: Bilateral external ears normal, Nose normal. Abrasion to the left forehead  Eyes: Pupils are equal and reactive, Conjunctiva normal, Non-icteric.   Neck: Normal range of motion, No tenderness, Supple, No stridor.   Lymphatic: No lymphadenopathy noted. "   Cardiovascular: Tachycardic, regular rhythm, no murmurs.   Thorax & Lungs: Rhonchorous lung sounds, No respiratory distress, No wheezing, No chest tenderness.   Abdomen: Bowel sounds normal, Soft, No tenderness, No masses, No pulsatile masses. No peritoneal signs.  Skin: Warm, Dry, No erythema, No rash.   Back: No bony tenderness, No CVA tenderness.   Extremities: Intact distal pulses, No edema, No tenderness, No cyanosis.  Musculoskeletal: Good range of motion in all major joints. No tenderness to palpation or major deformities noted.   Neurologic: Alert , Normal motor function, Normal sensory function, No focal deficits noted.   Psychiatric: Affect normal, Judgment normal, Mood normal.      DIAGNOSTIC STUDIES    Labs:   Labs Reviewed   CBC WITH DIFFERENTIAL - Abnormal; Notable for the following components:       Result Value    WBC 20.0 (*)     RBC 4.62 (*)     MCH 33.5 (*)     Neutrophils-Polys 90.10 (*)     Lymphocytes 2.60 (*)     Neutrophils (Absolute) 18.02 (*)     Lymphs (Absolute) 0.52 (*)     Monos (Absolute) 1.25 (*)     Immature Granulocytes (abs) 0.19 (*)     All other components within normal limits   COMP METABOLIC PANEL - Abnormal; Notable for the following components:    Sodium 131 (*)     Glucose 101 (*)     Alkaline Phosphatase 108 (*)     All other components within normal limits   PROCALCITONIN - Abnormal; Notable for the following components:    Procalcitonin 0.64 (*)     All other components within normal limits   DIAGNOSTIC ALCOHOL   LACTIC ACID   URINALYSIS    Narrative:     Indication for culture:->Evaluation for sepsis without a  clear source of infection   ESTIMATED GFR   CORRECTED CALCIUM   TROPONIN   COV-2, FLU A/B, AND RSV BY PCR (CEPCmxtwentyID)   URINE CULTURE(NEW)    Narrative:     Indication for culture:->Evaluation for sepsis without a  clear source of infection   BLOOD CULTURE   BLOOD CULTURE   PLATELET MAPPING WITH BASIC TEG      All labs reviewed by me.     EKG:   I have  independently interpreted this EKG  12 Lead EKG interpreted by me to show:  Normal sinus rhythm  Rate 118  Axis: Normal  Intervals: Normal  Normal T waves  Normal ST segments  My impression of this EKG: Does not indicate ischemia or arrhythmia at this time.      Radiology:   The attending emergency physician has independently interpreted the diagnostic imaging associated with this visit and am waiting the final reading from the radiologist.   Preliminary interpretation is a follows multiple punctate intracranial bleeds in the left frontal region.  Radiologist interpretation:   CT-CSPINE WITH PLUS RECONS   Final Result         1.  Multilevel degenerative changes of the cervical spine limit diagnostic sensitivity of this examination, otherwise no acute traumatic bony injury of the cervical spine is apparent.   2.  Atherosclerosis      CT-HEAD W/O   Final Result         1.  Multiple small left frontal parenchymal hemorrhages measuring up to 4.6 mm.   2.  Moderate bilateral ventricular dilatation, may represent ex vacuo changes, consider component of normal pressure hydrocephalus as clinically appropriate. Appears overall stable since prior study.   3.  Nonspecific white matter changes, commonly associated with small vessel ischemic disease.  Associated mild cerebral atrophy is noted.   4.  Mild bilateral and frontal and ethmoid sinusitis changes   5.  Atherosclerosis.      Based solely on CT findings, the brain injury guideline category is mBIG 3.      EDH   IVH   Displaced skull fx   SDH > 8mm   IPH > 8mm or multiple   SAH bi-hemispheric or > 3mm      The original BIG retrospective analysis found radiographic progression in 0% of BIG 1 patients and 2.6% BIG 2.      These findings were discussed with the patient's clinician, Arjun Ramos, on 3/11/2023 5:21 AM.      DX-CHEST-PORTABLE (1 VIEW)   Final Result         1.  No acute cardiopulmonary disease.   2.  Atherosclerosis      CT-HEAD W/O    (Results Pending)       COURSE & MEDICAL DECISION MAKING     ED Observation Status? Yes; I am placing the patient in to an observation status due to a diagnostic uncertainty as well as therapeutic intensity. Patient placed in observation status at 3:12 AM, 3/11/2023.     Observation plan is as follows: Monitor for symptom management and diagnostic results    Upon Reevaluation, the patient's condition has: not improved; and will be escalated to hospitalization.    Patient discharged from ED Observation status at 5:46 AM   (Time) 3/11/23 (Date).     INITIAL ASSESSMENT, COURSE AND PLAN  Care Narrative: Patient presents with alteration in mental status after a seizure.  The patient likely had alcohol withdrawal seizure.  We will get a CT scan of the head to evaluate for intracranial bleed.  In addition the patient is altered.  We will evaluate for infection.  We will get a chest x-ray as well as urinalysis.  We will get an alcohol level we will treat the patient with Valium for alcohol withdrawal and reassess.    3:00 AM - Patient seen and examined at bedside by medical student. Ordered CT-head w/o, diagnostic alcohol, CBC w/ diff, BMP and EKG to evaluate.    3:11 AM - Patient seen and examined at bedside by myself. Patient will be medicated with Haldol 5 mg, and Valium 10 mg for his symptoms. Ordered CT-C-spine w/o    HYDRATION: Based on the patient's presentation of Tachycardia the patient was given IV fluids. IV Hydration was used because oral hydration was not adequate alone. Upon recheck following hydration, the patient was improved.     The patient was found to have a negative chest x-ray.  The patient was given broad-spectrum antibiotics for concern for sepsis.  A COVID swab was sent.  EKG shows no concerning features.  There is a white count of 20.  Neurosurgery as well as trauma surgery will be consulted at this time.    DISPOSITION AND DISCUSSIONS  I have discussed management of the patient with the following physicians and PRETTY's:    I spoke to neurosurgery as well as trauma surgery.  They both feel that the patient will be best served in the intensive care medical unit.  The recommendation is every 1 hour neurochecks as well as a repeat CT scan in 6 hours.    Discussion of management with other [facility] or appropriate source(s): Radiologist spoke to about the brain bleed.          Barriers to care at this time, including but not limited to: Patient does not have established PCP.     Decision tools and prescription drugs considered including, but not limited to: Antibiotics given for possible sepsis. .      The total critical care time on this patient is 40 minutes, resuscitating patient, speaking with admitting physician, and deciphering test results. This  is exclusive of separately billable procedures.    Patient will be admitted to the ICU in guarded condition.  Lactic acid is normal.  The patient does have a leukocytosis.  There is no significant electrolyte derangements.      FINAL DIAGNOSIS  1. Seizure (HCC)    2. Sepsis, due to unspecified organism, unspecified whether acute organ dysfunction present (HCC)    3. Alcohol withdrawal syndrome with complication (HCC)    4. Intracranial bleeding (HCC)        Oniel LOPEZ (Mohini), am scribing for, and in the presence of, Arjun Ramos M.D..    Electronically signed by: Oniel Good (Mohini), 3/11/2023    Arjun LOPEZ M.D. personally performed the services described in this documentation, as scribed by Oniel Good in my presence, and it is both accurate and complete.      The note accurately reflects work and decisions made by me.  Arjun Ramos M.D.  3/11/2023  5:48 AM

## 2023-03-11 NOTE — PROGRESS NOTES
Pharmacy Vancomycin Kinetics Note for 3/11/2023     62 y.o. male on Vancomycin day # 1     Vancomycin Indication (AUC Dosing): Sepsis    Provider specified end date:  (TBD)    Active Antibiotics (From admission, onward)      Ordered     Ordering Provider       Sat Mar 11, 2023  9:57 AM    03/11/23 0957  vancomycin (VANCOCIN) 1,500 mg in  mL IVPB  (vancomycin (VANCOCIN) IV (LD + Maintenance))  EVERY 12 HOURS        Note to Pharmacy: Per pharmacy pharmacokinetic protocol.    Arjun Callejas M.D.       Sat Mar 11, 2023  6:57 AM    03/11/23 0657  MD Alert...Vancomycin per Pharmacy  PHARMACY TO DOSE        Question:  Indication(s) for vancomycin?  Answer:  Unknown source of infection    Arjun Callejas M.D.    03/11/23 0657  piperacillin-tazobactam (Zosyn) 4.5 g in  mL IVPB  (piperacillin-tazobactam (ZOSYN) IV (Extended-infusion) PANEL )  EVERY 8 HOURS        See [facility] for full Linked Orders Report.    Arjun Callejas M.D.            Dosing Weight: 67.4 kg (148 lb 9.4 oz)      Admission History: Admitted on 3/11/2023 for Traumatic intracerebral hemorrhage, with unknown loss of consciousness status, initial encounter [S06.36AA]  Pertinent history: Patient presented to the ER for evaluation of a seizure. He has a hx of EToH use and hydrocephalus. A CT of the head reveals multiple small left frontal parenchymal hemorrhages. He is febrile and has a leukocytosis, which may be due to his seizure, or could be related to an infectious process. Broad spectrum abx were initiated.    Allergies:     Patient has no known allergies.     Pertinent cultures to date:     Results       Procedure Component Value Units Date/Time    COV-2, FLU A/B, AND RSV BY PCR (2-4 HOURS CEPHEID): Collect NP swab in VTM [[id number]] Collected: 03/11/23 0535    Order Status: Completed Specimen: Respirate Updated: 03/11/23 0736     Influenza virus A RNA Negative     Influenza virus B, PCR Negative     RSV, PCR Negative  "    SARS-CoV-2 by PCR NotDetected     Comment: [facility] providers: PLEASE REFER TO DE-ESCALATION AND RETESTING PROTOCOL  on [email or web address]    **The [employer] GeneXpert Xpress SARS-CoV-2 RT-PCR Test has been made  available for use under the Emergency Use Authorization (EUA) only.          SARS-CoV-2 Source NP Swab    MRSA By PCR (Amp) [[id number]] Collected: 03/11/23 0706    Order Status: Sent Specimen: Respirate from Nares Updated: 03/11/23 0720    Urinalysis [[id number]]     Order Status: Sent Specimen: Urine, Wing Cath     Urine Culture [[id number]]     Order Status: Sent Specimen: Urine, Wing Cath     Blood Culture [[id number]] Collected: 03/11/23 0516    Order Status: Sent Specimen: Blood from Peripheral Updated: 03/11/23 0615    Narrative:      Per Hospital Policy: Only change Specimen Src: to \"Line\" if  specified by physician order.    Blood Culture [[id number]] Collected: 03/11/23 0446    Order Status: Sent Specimen: Blood from Peripheral Updated: 03/11/23 0613    Narrative:      Per Hospital Policy: Only change Specimen Src: to \"Line\" if  specified by physician order.    Urinalysis [[id number]] Collected: 03/11/23 0454    Order Status: Completed Specimen: Urine Updated: 03/11/23 0525     Color Yellow     Character Clear     Specific Gravity 1.017     Ph 7.0     Glucose Negative mg/dL      Ketones Negative mg/dL      Protein Negative mg/dL      Bilirubin Negative     Urobilinogen, Urine 1.0     Nitrite Negative     Leukocyte Esterase Negative     Occult Blood Negative     Micro Urine Req see below     Comment: Microscopic examination not performed when specimen is clear  and chemically negative for protein, blood, leukocyte esterase  and nitrite.         Narrative:      Indication for culture:->Evaluation for sepsis without a  clear source of infection    Urine Culture (New) [[id number]] Collected: 03/11/23 0454    Order Status: Sent Specimen: Urine Updated: 03/11/23 0519    Narrative:      Indication for " "culture:->Evaluation for sepsis without a  clear source of infection            Labs:     Estimated Creatinine Clearance: 136.2 mL/min (by C-G formula based on SCr of 0.55 mg/dL).  Recent Labs     23  0420   WBC 20.0*   NEUTSPOLYS 90.10*     Recent Labs     23  0420   BUN 10   CREATININE 0.55   ALBUMIN 4.2     No intake or output data in the 24 hours ending 23 0957   /73   Pulse (!) 101   Temp 37.5 °C (99.5 °F) (Temporal)   Resp 19   Ht 1.803 m (5' 11\")   Wt 67.4 kg (148 lb 9.4 oz)   SpO2 93%  Temp (24hrs), Av.8 °C (100.1 °F), Min:37.3 °C (99.1 °F), Max:38.7 °C (101.6 °F)      List concerns for Vancomycin clearance:     Hypermetabolic State (SIRS)    Pharmacokinetics:     AUC kinetics:   Ke (hr ^-1): 0.1174 hr^-1  Half life: 5.9 hr  Clearance: 5.143  Estimated TDD: 2571.5  Estimated Dose: 846  Estimated interval: 7.9    A/P:     -  Vancomycin dose: 1,500 mg IV Q12h (05:00, 17:00)     -  Next vancomycin level(s):    -Not yet ordered     -  Predicted vancomycin AUC from initial AUC test calculator: 583 mg·hr/L    -  Comments: An MRSA nares was ordered. Concerns for renal accumulation of vancomycin listed above. A peak and trough will be obtained at steady state. Pharmacy will continue to follow and recommend de-escalation of antibiotics as appropriate.     Keyonna Mckeon, PharmD, BCPS      "

## 2023-03-12 PROBLEM — E87.1 HYPONATREMIA: Status: ACTIVE | Noted: 2023-03-12

## 2023-03-12 PROBLEM — R13.10 DYSPHAGIA: Status: ACTIVE | Noted: 2023-03-12

## 2023-03-12 LAB
ALBUMIN SERPL BCP-MCNC: 2.8 G/DL (ref 3.2–4.9)
ALBUMIN/GLOB SERPL: 1 G/DL
ALP SERPL-CCNC: 75 U/L (ref 30–99)
ALT SERPL-CCNC: 21 U/L (ref 2–50)
ANION GAP SERPL CALC-SCNC: 9 MMOL/L (ref 7–16)
AST SERPL-CCNC: 37 U/L (ref 12–45)
BILIRUB SERPL-MCNC: 0.4 MG/DL (ref 0.1–1.5)
BUN SERPL-MCNC: 8 MG/DL (ref 8–22)
CALCIUM ALBUM COR SERPL-MCNC: 9.2 MG/DL (ref 8.5–10.5)
CALCIUM SERPL-MCNC: 8.2 MG/DL (ref 8.5–10.5)
CHLORIDE SERPL-SCNC: 108 MMOL/L (ref 96–112)
CO2 SERPL-SCNC: 21 MMOL/L (ref 20–33)
CREAT SERPL-MCNC: 0.45 MG/DL (ref 0.5–1.4)
ERYTHROCYTE [DISTWIDTH] IN BLOOD BY AUTOMATED COUNT: 51.9 FL (ref 35.9–50)
GFR SERPLBLD CREATININE-BSD FMLA CKD-EPI: 120 ML/MIN/1.73 M 2
GLOBULIN SER CALC-MCNC: 2.7 G/DL (ref 1.9–3.5)
GLUCOSE SERPL-MCNC: 86 MG/DL (ref 65–99)
HCT VFR BLD AUTO: 39.8 % (ref 42–52)
HGB BLD-MCNC: 13.1 G/DL (ref 14–18)
MAGNESIUM SERPL-MCNC: 2.1 MG/DL (ref 1.5–2.5)
MCH RBC QN AUTO: 33.9 PG (ref 27–33)
MCHC RBC AUTO-ENTMCNC: 32.9 G/DL (ref 33.7–35.3)
MCV RBC AUTO: 102.8 FL (ref 81.4–97.8)
PHOSPHATE SERPL-MCNC: 3.2 MG/DL (ref 2.5–4.5)
PLATELET # BLD AUTO: 230 K/UL (ref 164–446)
PMV BLD AUTO: 9.6 FL (ref 9–12.9)
POTASSIUM SERPL-SCNC: 4.2 MMOL/L (ref 3.6–5.5)
PROT SERPL-MCNC: 5.5 G/DL (ref 6–8.2)
RBC # BLD AUTO: 3.87 M/UL (ref 4.7–6.1)
SODIUM SERPL-SCNC: 138 MMOL/L (ref 135–145)
SODIUM SERPL-SCNC: 138 MMOL/L (ref 135–145)
WBC # BLD AUTO: 10.6 K/UL (ref 4.8–10.8)

## 2023-03-12 PROCEDURE — 700102 HCHG RX REV CODE 250 W/ 637 OVERRIDE(OP): Performed by: STUDENT IN AN ORGANIZED HEALTH CARE EDUCATION/TRAINING PROGRAM

## 2023-03-12 PROCEDURE — 84100 ASSAY OF PHOSPHORUS: CPT

## 2023-03-12 PROCEDURE — 770020 HCHG ROOM/CARE - TELE (206)

## 2023-03-12 PROCEDURE — 700102 HCHG RX REV CODE 250 W/ 637 OVERRIDE(OP): Performed by: INTERNAL MEDICINE

## 2023-03-12 PROCEDURE — 80053 COMPREHEN METABOLIC PANEL: CPT

## 2023-03-12 PROCEDURE — A9270 NON-COVERED ITEM OR SERVICE: HCPCS | Performed by: INTERNAL MEDICINE

## 2023-03-12 PROCEDURE — 92610 EVALUATE SWALLOWING FUNCTION: CPT

## 2023-03-12 PROCEDURE — 99254 IP/OBS CNSLTJ NEW/EST MOD 60: CPT | Performed by: HOSPITALIST

## 2023-03-12 PROCEDURE — 84295 ASSAY OF SERUM SODIUM: CPT

## 2023-03-12 PROCEDURE — 94669 MECHANICAL CHEST WALL OSCILL: CPT

## 2023-03-12 PROCEDURE — 700105 HCHG RX REV CODE 258: Performed by: INTERNAL MEDICINE

## 2023-03-12 PROCEDURE — 700111 HCHG RX REV CODE 636 W/ 250 OVERRIDE (IP): Performed by: INTERNAL MEDICINE

## 2023-03-12 PROCEDURE — 83735 ASSAY OF MAGNESIUM: CPT

## 2023-03-12 PROCEDURE — A9270 NON-COVERED ITEM OR SERVICE: HCPCS | Performed by: STUDENT IN AN ORGANIZED HEALTH CARE EDUCATION/TRAINING PROGRAM

## 2023-03-12 PROCEDURE — 700111 HCHG RX REV CODE 636 W/ 250 OVERRIDE (IP): Performed by: STUDENT IN AN ORGANIZED HEALTH CARE EDUCATION/TRAINING PROGRAM

## 2023-03-12 PROCEDURE — 85027 COMPLETE CBC AUTOMATED: CPT

## 2023-03-12 PROCEDURE — 99233 SBSQ HOSP IP/OBS HIGH 50: CPT | Performed by: STUDENT IN AN ORGANIZED HEALTH CARE EDUCATION/TRAINING PROGRAM

## 2023-03-12 PROCEDURE — 700105 HCHG RX REV CODE 258: Performed by: STUDENT IN AN ORGANIZED HEALTH CARE EDUCATION/TRAINING PROGRAM

## 2023-03-12 RX ORDER — ACETAMINOPHEN 325 MG/1
650 TABLET ORAL EVERY 4 HOURS PRN
Status: DISCONTINUED | OUTPATIENT
Start: 2023-03-12 | End: 2023-03-16 | Stop reason: HOSPADM

## 2023-03-12 RX ORDER — ACETAMINOPHEN 650 MG/1
650 SUPPOSITORY RECTAL EVERY 4 HOURS PRN
Status: DISCONTINUED | OUTPATIENT
Start: 2023-03-12 | End: 2023-03-16 | Stop reason: HOSPADM

## 2023-03-12 RX ADMIN — THIAMINE HYDROCHLORIDE 500 MG: 100 INJECTION, SOLUTION INTRAMUSCULAR; INTRAVENOUS at 05:34

## 2023-03-12 RX ADMIN — ACETAMINOPHEN 650 MG: 325 TABLET, FILM COATED ORAL at 16:07

## 2023-03-12 RX ADMIN — AMPICILLIN AND SULBACTAM 3 G: 1; 2 INJECTION, POWDER, FOR SOLUTION INTRAMUSCULAR; INTRAVENOUS at 00:13

## 2023-03-12 RX ADMIN — AMPICILLIN AND SULBACTAM 3 G: 1; 2 INJECTION, POWDER, FOR SOLUTION INTRAMUSCULAR; INTRAVENOUS at 11:31

## 2023-03-12 RX ADMIN — ACETAMINOPHEN 650 MG: 325 TABLET, FILM COATED ORAL at 20:25

## 2023-03-12 RX ADMIN — AMPICILLIN AND SULBACTAM 3 G: 1; 2 INJECTION, POWDER, FOR SOLUTION INTRAMUSCULAR; INTRAVENOUS at 05:33

## 2023-03-12 RX ADMIN — AMPICILLIN AND SULBACTAM 3 G: 1; 2 INJECTION, POWDER, FOR SOLUTION INTRAMUSCULAR; INTRAVENOUS at 23:55

## 2023-03-12 RX ADMIN — ACETAMINOPHEN 650 MG: 325 TABLET, FILM COATED ORAL at 06:39

## 2023-03-12 RX ADMIN — AMPICILLIN AND SULBACTAM 3 G: 1; 2 INJECTION, POWDER, FOR SOLUTION INTRAMUSCULAR; INTRAVENOUS at 18:14

## 2023-03-12 ASSESSMENT — ENCOUNTER SYMPTOMS
SPUTUM PRODUCTION: 0
SORE THROAT: 0
VOMITING: 0
HEADACHES: 0
ABDOMINAL PAIN: 0
CHILLS: 0
FOCAL WEAKNESS: 0
EYES NEGATIVE: 1
PALPITATIONS: 0
SHORTNESS OF BREATH: 0
MUSCULOSKELETAL NEGATIVE: 1
FEVER: 0
NAUSEA: 0

## 2023-03-12 ASSESSMENT — PAIN DESCRIPTION - PAIN TYPE
TYPE: ACUTE PAIN

## 2023-03-12 ASSESSMENT — COPD QUESTIONNAIRES
DO YOU EVER COUGH UP ANY MUCUS OR PHLEGM?: YES, EVERY DAY
HAVE YOU SMOKED AT LEAST 100 CIGARETTES IN YOUR ENTIRE LIFE: YES
DURING THE PAST 4 WEEKS HOW MUCH DID YOU FEEL SHORT OF BREATH: MOST  OR ALL OF THE TIME
COPD SCREENING SCORE: 8

## 2023-03-12 NOTE — PROGRESS NOTES
Med rec completed per patient at bedside.  Allergies reviewed with patient. TANIKA.  Patient's preferred pharmacy: [facility] on [address].     Patient denies using any prescription medications at home.  No vitamins or supplements.  No recent over-the-counter medications.  No outpatient antibiotics within the last 30 days.

## 2023-03-12 NOTE — ASSESSMENT & PLAN NOTE
Has previously seen neurosurgery  They consulted this admission:  - treat sepsis  - get him through acute phase  - can get LP with opening pressure and therapeutic drainage. If this provides symptomatic improvement he will see in clinic for consideration of shunt

## 2023-03-12 NOTE — PROGRESS NOTES
Neurosurgery Progress Note    Subjective:  Patient awake and somewhat agitated. Much better than on admission. Last drank etoh 2 days ago??     Exam:  Alert, oriented x 2  Perrl  Follows commands and has no drift.   Attempting to get out of bed, but not sure where he is going  Restraints in place.     BP  Min: 88/56  Max: 138/78  Pulse  Av  Min: 54  Max: 102  Resp  Av.1  Min: 20  Max: 82  Temp  Av.9 °C (98.5 °F)  Min: 36.9 °C (98.5 °F)  Max: 36.9 °C (98.5 °F)  Monitored Temp 2  Av.6 °C (97.9 °F)  Min: 36.1 °C (97 °F)  Max: 38.4 °C (101.1 °F)  SpO2  Av.5 %  Min: 92 %  Max: 99 %    No data recorded    Recent Labs     23  0420 23  0600   WBC 20.0* 10.6   RBC 4.62* 3.87*   HEMOGLOBIN 15.5 13.1*   HEMATOCRIT 45.0 39.8*   MCV 97.4 102.8*   MCH 33.5* 33.9*   MCHC 34.4 32.9*   RDW 47.8 51.9*   PLATELETCT 307 230   MPV 9.1 9.6     Recent Labs     23  0420 23  0951 23  1750 23  0020 23  0600   SODIUM 131*   < > 136 138 138   POTASSIUM 3.8  --   --   --  4.2   CHLORIDE 98  --   --   --  108   CO2 22  --   --   --  21   GLUCOSE 101*  --   --   --  86   BUN 10  --   --   --  8   CREATININE 0.55  --   --   --  0.45*   CALCIUM 9.0  --   --   --  8.2*    < > = values in this interval not displayed.     Recent Labs     23  0420   INR 1.01     Recent Labs     23  0614   REACTMIN 2.8*   CLOTKINET 1.1   CLOTANGL 74.9   MAXCLOTS 60.4   CJI13FOD 1.0   PRCINADP 95.1*   PRCINAA 24.7*       Intake/Output                         23 0700 - 2359 23 - 23 0659      Total  Total                 Intake    I.V.  496.6  1485.3 1981.8  150  -- 150    Magnesium Sulfate Volume 49.9 -- 49.9 -- -- --    Precedex Volume 15.8 0 15.8 -- -- --    Volume (mL) (NS infusion) 430.9 1485.3 1916.2 150 -- 150    IV Piggyback  1344  83.3 1427.3  --  -- --    Volume (mL) (vancomycin (VANCOCIN) 1,750 mg in  mL IVPB)  574.8 -- 574.8 -- -- --    Volume (mL) (thiamine (B-1) 500 mg in dextrose 5% 100 mL IVPB) 713.3 -- 713.3 -- -- --    Volume (mL) (piperacillin-tazobactam (Zosyn) 4.5 g in  mL IVPB) 39.5 0 39.5 -- -- --    Volume (mL) (vancomycin (VANCOCIN) 1,500 mg in  mL IVPB) -- 0 0 -- -- --    Volume (mL) (potassium phosphate 30 mmol in  mL ivpb) 0 0 0 -- -- --    Volume (mL) (ampicillin/sulbactam (UNASYN) 3 g in  mL IVPB) 16.4 83.3 99.7 -- -- --    Total Intake 1840.6 1568.6 3409.2 150 -- 150       Output    Urine  655  900 1555  150  -- 150    Number of Times Voided 0 x -- 0 x -- -- --    Output (mL) (Urethral Catheter 16 Fr.)  150 -- 150    Stool  --  -- --  --  -- --    Number of Times Stooled 0 x -- 0 x 0 x -- 0 x    Total Output  150 -- 150       Net I/O     1185.6 668.6 1854.2 0 -- 0              Intake/Output Summary (Last 24 hours) at 3/12/2023 1038  Last data filed at 3/12/2023 1028  Gross per 24 hour   Intake 2486.59 ml   Output 1705 ml   Net 781.59 ml             acetaminophen  650 mg Q4HRS PRN    Or    acetaminophen  650 mg Q4HRS PRN    LORazepam  4 mg Q10 MIN PRN    Respiratory Therapy Consult   Continuous RT    ondansetron  4 mg Q4HRS PRN    Or    ondansetron  4 mg Q4HRS PRN    MD Alert...Adult ICU Electrolyte Replacement per Pharmacy   PHARMACY TO DOSE    thiamine  500 mg DAILY    Followed by    [START ON 3/14/2023] thiamine  100 mg DAILY    LORazepam  1-2 mg Q2HRS PRN    hydrALAZINE  20 mg Q6HRS PRN    labetalol  10-20 mg Q6HRS PRN    ampicillin-sulbactam (UNASYN) IV  3 g Q6HRS       Assessment and Plan:  Hospital day #2  POD #na  Prophylactic anticoagulation: no         Start date/time: 3/14    Patient with frequent falls, etoh abuse. Had witnessed seizure where he hit his head. Has small frontal contusions, stable on follow up CT  He has large ventricles and probably has a component of hydrocephalus.   We have considered placing a shunt in the past but ability to  follow up with this gentleman in quite limited.  He can go to q 4 neuro checks and can go to the floor once stable.   On antibiotics, ? Sepsis on admit.   I can see in follow up in clinic and discuss VPS once he is normalized.   Putting it in with out documenting that he can follow up is not a good idea.

## 2023-03-12 NOTE — CARE PLAN
The patient is Stable - Low risk of patient condition declining or worsening    Shift Goals  Clinical Goals: Rest, Stable Neuro's, MRI  Patient Goals: Sleep  Family Goals: LIN    Progress made toward(s) clinical / shift goals:     Problem: Optimal Care for Alcohol Withdrawal  Goal: Optimal Care for the alcohol withdrawal patient  Description: Target End Date:  1 to 3 days    1.  Alcohol history screening done on admission  2.  CIWA-AR score assessment (includes assessment of nausea/vomiting, tremor, sweats, anxiety, agitation, tactile, visual and auditory disturbances, headache and orientation/sensorium).  Document on CIWA flowsheet.  3.  Follow CIWA-AR score protocol  4.  Frequent reorientation  5.  Monitor for fluid and electrolyte imbalance.  6.  Assess for respiratory depression due to sedation (pulse ox)  7.  Consider thiamine, multivitamins, folic acid and magnesium sulfate per provider order  8.  Collaborate with Social Workers/Case Management  9.  Collaborate with mental health  Outcome: Progressing     Problem: Knowledge Deficit - Standard  Goal: Patient and family/care givers will demonstrate understanding of plan of care, disease process/condition, diagnostic tests and medications  Description: Target End Date:  1-3 days or as soon as patient condition allows    Document in Patient Education    1.  Patient and family/caregiver oriented to unit, equipment, visitation policy and means for communicating concern  2.  Complete/review Learning Assessment  3.  Assess knowledge level of disease process/condition, treatment plan, diagnostic tests and medications  4.  Explain disease process/condition, treatment plan, diagnostic tests and medications  Outcome: Progressing     Problem: Seizure Precautions  Goal: Implementation of seizure precautions  Description: Target End Date:  Prior to discharge or change in level of care    1.  Padded side rails up at all times  2.  Suction equipment and oxygen delivery system  at bedside  3.  Continuous pulse oximeter in use  4.  Implement fall precautions, bed alarm on, bed in lowest position  5.  IV access (per order)  6.  Provide low stimulus environment, avoid exposure to triggers  7.  Instruct patient to use call light/seizure button if having warning signs of impending seizure      Problem: Hemodynamics  Goal: Patient's hemodynamics, fluid balance and neurologic status will be stable or improve  Description: Target End Date:  Prior to discharge or change in level of care    Document on Assessment and I/O flowsheet templates    1.  Monitor vital signs, pulse oximetry and cardiac monitor per provider order and/or policy  2.  Maintain blood pressure per provider order  3.  Hemodynamic monitoring per provider order  4.  Manage IV fluids and IV infusions  5.  Monitor intake and output  6.  Daily weights per unit policy or provider order  7.  Assess peripheral pulses and capillary refill  8.  Assess color and body temperature  9.  Position patient for maximum circulation/cardiac output  10. Monitor for signs/symptoms of excessive bleeding  11. Assess mental status, restlessness and changes in level of consciousness  12. Monitor temperature and report fever or hypothermia to provider immediately. Consideration of targeted temperature management.  Outcome: Progressing     Problem: Neuro Status  Goal: Neuro status will remain stable or improve  Description: Target End Date:  Prior to discharge or change in level of care    Document on Neuro assessment in the Assessment flowsheet    1.  Assess and monitor neurologic status per provider order/protocol/unit policy  2.  Assess level of consciousness and orientation  3.  Assess for speech, dysarthria, dysphagia, facial symmetry  4.  Assess visual field, eye movements, gaze preference, pupil reaction and size  5.  Assess muscle strength and motor response in all four extremities  6.  Assess for sensation (numbness and tingling)  7.  Assess basic  neuro reflexes (cough, gag, corneal)  8.  Identify changes in neuro status and report to provider for testing/treatment orders  Outcome: Progressing   Outcome: Progressing      Patient is not progressing towards the following goals: N/A

## 2023-03-12 NOTE — PROGRESS NOTES
Neurosurgery recommendations reviewed.  Repeat head CT stable.  No additional trauma surgery recommendations at this time.  Trauma surgery will sign off, please contact our service if you have any additional questions or concerns.

## 2023-03-12 NOTE — ASSESSMENT & PLAN NOTE
This is Sepsis Present on admission  SIRS criteria identified on my evaluation include: Fever, with temperature greater than 101 deg F and Leukocytosis, with WBC greater than 12,000  Source is Unidentified  Sepsis protocol initiated  Fluid resuscitation ordered per protocol  Crystalloid Fluid Administration: Fluid resuscitation ordered per standard protocol - 30 mL/kg per current or ideal body weight  IV antibiotics as appropriate for source of sepsis  Reassessment: I have reassessed the patient's hemodynamic status    Cultures remain negative  Continue empiric Unasyn  MRSA nares negative

## 2023-03-12 NOTE — THERAPY
"Speech Language Pathology   Initial Assessment     Patient Name: James Samaniego  AGE:  62 y.o., SEX:  male  Medical Record #: [id number]  Today's Date: 3/12/2023     Precautions  Precautions: Fall Risk, Swallow Precautions    Assessment    HPI: Patient is 62 y.o. male who presented to the ED after a 1 minute witnessed seizure and hit the left side of his head.  CT of head revealed multiple small left frontal parenchymal hemorrhages.  He may be a candidate for shunt 2/2 NPH, but given the fact he is being treated for sepsis, this is not yet an option per neuro surgery.    RN did bedside swallow screen this morrning, and patient passed, so he was given regular meal tray.  RN noted that patient was impulsive and was also coughing following the meal, so she asked SLP to complete the formal swallow evaluation that was ordered.     PMHx:  ETOH abuse, cannabis abuse, hypothyroidism, and NPH.    Imaging:  CT Head 3/11/23  \"1.  Multiple small left frontal parenchymal hemorrhages measuring up to 4.6 mm.  2.  Moderate bilateral ventricular dilatation, may represent ex vacuo changes, consider component of normal pressure hydrocephalus as clinically appropriate. Appears overall stable since prior study.  3.  Nonspecific white matter changes, commonly associated with small vessel ischemic disease.  Associated mild cerebral atrophy is noted.  4.  Mild bilateral and frontal and ethmoid sinusitis changes  5.  Atherosclerosis.\"    Level of Consciousness: Alert, Awake  Affect/Behavior: Appropriate, Confused  Follows Directives: Yes  Orientation: Self, Situation, Current month and year with minimal assistance   Hearing: Functional hearing  Vision: Functional vision      Prior Living Situation & Level of Function:    Pt lives at the [facility].  He was not able to provide a good history of his PLOF.  He did indicate that he has been coughing more since being in the hospital.     Oral Mechanism Evaluation  Facial Symmetry: Equal  Facial " Sensation: Equal  Labial Observations: WFL  Lingual Observations: Midline  Dentition: Edentulous--no dentures  Comments:      Voice  Quality: Harsh  Resonance: WFL  Intensity: Appropriate  Cough:  cued cough was weak, and reflexive cough was noted to be fair overall  Comments:      Current Method of Nutrition   NPO until cleared by speech pathology      Subjective  Pt sitting up in bed and agreeable to evaluation       Assessment  Positioning: Napoles's (60-90 degrees)  Bolus Administration: SLP and Patient  Oxygen Requirements:  3 L Nasal Cannula  Factor(s) Affecting Performance: Impaired endurance, Impaired mental status    Swallowing Trials  Ice: Impaired  Thin Liquid (TN0): Impaired  Mildly Thick Liquid (MT2): Impaired  Liquidised (LQ3): WFL  Pureed (PU4): Impaired  Soft & Bite-Sized (SB6): Impaired    Comments:  Pt presenting with audible upper airway congestion and immediate coughing following swallows of thin liquids and to a lesser extent following swallows of purees.  Pt with c/o increased difficulty breathing after this and increased RR with use of accessory muscles was noted.  On soft and bite sized, he had prolonged mastication due to edentulous state.  He did not have any overt S/sx of aspiration on single peach boluses x2, but once bolus was mixed with juice from the fruit cocktail, he had immediate coughing episode with wet vocal quality.  He had cough x1 following large cup sip (self administered) of mildly thick liquids, but with single, small sips, there were no difficulties noted.      Clinical Impressions:  Patient is presenting with oral dysphagia and S/Sx of pharyngeal dysphagia as evidenced in prolonged oral phase, orolingual insufficiency with suspected posterior loss of bolus resulting in overt S/Sx of aspiration on thin liquids, purees and mixed consistencies.  At this time, patient does appear appropriate for re-initiation of a modified diet given 1:1 feeding to minimize associated risks.   Patient was in agreement with recommendations for diet modification.  Swallow precautions are noted below.  Please discontinue PO with any signs of aspiration or difficulties.  Should patient continue to present with difficulties, will consider FEES for further assessment of oropharyngeal function and to determine safest PO diet.  Discussed at length with Dr. Alejandro and RN.  Thank you for the consult.        Recommendations  1.  Liquidised diet with mildly thick liquids (LQ3/MT2)  2.  Instrumentation: Instrumental swallow study pending clinical progress--if S/Sx of aspiration persist.  Have orders for FEES from Dr. Alejandro, and can cancel if patient is doing well  3.  Swallowing Instructions & Precautions:   Supervision: 1:1 feeding with constant supervision, Direct supervision during meals  Positioning: Fully upright and midline during oral intake  Medication: Crush with applesauce  Strategies: Small bites/sips, Alternate bites and sips, Slow rate of intake, No straws  Oral Care: Q6h  4.  Cognitive evaluation to be completed by SLP  5.  STOP PO WITH ANY DIFFICULTY    Plan    Recommend Speech Therapy 5 times per week until therapy goals are met for the following treatments:  Dysphagia Training and Patient / Family / Caregiver Education.    Discharge Recommendations: Recommend post-acute placement for additional speech therapy services prior to discharge home (vs out patient therapy depending on clinical progress)     Objective       03/12/23 1608   Precautions   Precautions Fall Risk;Swallow Precautions   Vitals   O2 (LPM) 3   O2 Delivery Device Silicone Nasal Cannula   Prior Living Situation   Housing / Facility Homeless  ([facility])   Lives with - Patient's Self Care Capacity Alone and Able to Care For Self   Prior Level Of Function   Patient's Primary Language English   Dysphagia Strategies / Recommendations   Strategies / Interventions Recommended (Yes / No) Yes   Compensatory Strategies 1:1 supervision during all  meals;No Straws;Head of Bed 90 Degrees During Eating / Drinking;Single Sips / Bites;Multiple Swallows   Diet / Liquid Recommendation Liquidised (3) - (Nectar Thick Full Liquid);Mildly Thick (2) - (Nectar Thick)   Medication Administration  Float Whole with Puree;Crush all Medications in Puree   Therapy Interventions Dysphagia Therapy By Speech Language Pathologist;Oral Motor Exercises;Pharyngeal / Laryngeal Exercises   Follow Up SLP Evaluation SLP to follow--also needs cog eval   Dysphagia Rating   Nutritional Liquid Intake Rating Scale Thickened beverages (mildly thick unless otherwise specified)   Nutritional Food Intake Rating Scale Total oral diet of a single consistency   Short Term Goals   Short Term Goal # 1 Patient will be able to consume LQ3/MT2 diet with no overt S/Sx of aspiration or changes in respiratory status.   Short Term Goal # 2 Pt will be able to consume MM5/SB6/TN0 textures with SLP only with no overt S/Sx of aspiration noted.   Problem List   Problem List Dysphagia   Anticipated Discharge Needs   Discharge Recommendations Recommend post-acute placement for additional speech therapy services prior to discharge home  (vs out patient therapy depending on clinical progress)   Therapy Recommendations Upon DC Dysphagia Training;Patient / Family / Caregiver Education;Community Re-Integration;Cognitive-Linguistic Training

## 2023-03-12 NOTE — CARE PLAN
The patient is Watcher - Medium risk of patient condition declining or worsening    Shift Goals  Clinical Goals: Rest, ABX as orderd, seizure precautions  Patient Goals: LIN  Family Goals: LIN    Progress made toward(s) clinical / shift goals:    Problem: Pain - Standard  Goal: Alleviation of pain or a reduction in pain to the patient’s comfort goal  Outcome: Progressing       Patient is not progressing towards the following goals:

## 2023-03-12 NOTE — CARE PLAN
Problem: Knowledge Deficit - Standard  Goal: Patient and family/care givers will demonstrate understanding of plan of care, disease process/condition, diagnostic tests and medications  Outcome: Progressing     Problem: Seizure Precautions  Goal: Implementation of seizure precautions  Outcome: Progressing   The patient is Watcher - Medium risk of patient condition declining or worsening    Shift Goals  Clinical Goals: Rest, ABX as orderd, seizure precautions  Patient Goals: LIN  Family Goals: LIN    Progress made toward(s) clinical / shift goals:    Problem: Knowledge Deficit - Standard  Goal: Patient and family/care givers will demonstrate understanding of plan of care, disease process/condition, diagnostic tests and medications  Outcome: Progressing     Problem: Seizure Precautions  Goal: Implementation of seizure precautions  Outcome: Progressing       Patient is not progressing towards the following goals:

## 2023-03-12 NOTE — ASSESSMENT & PLAN NOTE
No witnessed seizures in the hospital  Unclear if there was truly a seizure at home  More likely due to EtOH withdraw than these small bleeds    No indication keppra unless further seizures not attributed to EtOH withdraw

## 2023-03-12 NOTE — PROGRESS NOTES
"Critical Care Progress Note    Date of admission  3/11/2023    Chief Complaint  \"The entire history is obtained from healthcare providers and the medical record as this gentleman cannot provide me with any history.  This is a 62-year-old gentleman with history of alcohol abuse, cannabis abuse, hyperthyroidism and normal pressure hydrocephalus.  This gentleman was brought in by EMS to the ED this morning after he had a 1 minute witnessed seizure.  He whacked the left front of his head.  A CT of the head reveals multiple small left frontal parenchymal hemorrhages.  A CT of the cervical spine does not reveal evidence of fracture or subluxation.  He was appropriately evaluated by the trauma service, but they would like the medical intensivist team to admit and care for him.  It is my pleasure to do so.  Dr. Erwin Swain is kindly consulting from neurosurgery.\" - De Lo Shay      Interval Problem Update  Reviewed last 24 hour events:  Tmax: Afebrile this morning  Diet: Passed swallow - regular  Vasopressors: None    Antibx:   Unasyn 3/11 - present (empiric, fevers / leukocytosis)      Intake / Output  Urine Output past 24 hours: 1000mL      Lab Trends  WBC 20 --> 10.6    CMP is WNL    Review of Systems  Review of Systems   Constitutional:  Negative for chills, fever and malaise/fatigue.   HENT:  Negative for congestion and sore throat.    Eyes: Negative.    Respiratory:  Negative for sputum production and shortness of breath.    Cardiovascular:  Negative for chest pain and palpitations.   Gastrointestinal:  Negative for abdominal pain, nausea and vomiting.   Genitourinary: Negative.    Musculoskeletal: Negative.    Skin: Negative.    Neurological:  Negative for focal weakness and headaches.   All other systems reviewed and are negative.     Vital Signs for last 24 hours   Temp:  [36.9 °C (98.5 °F)] 36.9 °C (98.5 °F)  Pulse:  [] 64  Resp:  [20-82] 32  BP: ()/(56-89) 137/80  SpO2:  [92 %-99 %] 93 " %    Hemodynamic parameters for last 24 hours       Respiratory Information for the last 24 hours       Physical Exam   Physical Exam  Vitals and nursing note reviewed. Exam conducted with a chaperone present.   Constitutional:       General: He is awake. He is not in acute distress.  HENT:      Head:      Comments: Abrasion to left upper head     Mouth/Throat:      Mouth: Mucous membranes are moist.   Eyes:      Extraocular Movements: Extraocular movements intact.   Cardiovascular:      Rate and Rhythm: Normal rate and regular rhythm.      Pulses: Normal pulses.   Pulmonary:      Effort: Pulmonary effort is normal. No respiratory distress.   Abdominal:      General: There is no distension.      Palpations: Abdomen is soft.      Tenderness: There is no abdominal tenderness. There is no guarding or rebound.   Musculoskeletal:         General: Normal range of motion.      Cervical back: Normal range of motion and neck supple.   Skin:     General: Skin is warm and dry.      Capillary Refill: Capillary refill takes less than 2 seconds.      Findings: Lesion (scattered contusions, lesions over arms/legs) present.   Neurological:      General: No focal deficit present.      Mental Status: He is alert.       Medications  Current Facility-Administered Medications   Medication Dose Route Frequency Provider Last Rate Last Admin    acetaminophen (Tylenol) tablet 650 mg  650 mg Oral Q4HRS PRN Gabriel Whaley M.D.        Or    acetaminophen (TYLENOL) suppository 650 mg  650 mg Rectal Q4HRS PRN Gabriel Whaley M.D.        LORazepam (ATIVAN) injection 4 mg  4 mg Intravenous Q10 MIN PRN Arjun Callejas M.D.        Respiratory Therapy Consult   Nebulization Continuous RT Arjun Callejas M.D.        ondansetron (ZOFRAN ODT) dispertab 4 mg  4 mg Oral Q4HRS PRN Arjun Callejas M.D.        Or    ondansetron (ZOFRAN) syringe/vial injection 4 mg  4 mg Intravenous Q4HRS PRN Arjun Callejas M.D. MD  Alert...ICU Electrolyte Replacement per Pharmacy   Other PHARMACY TO DOSE Arjun Callejas M.D.        thiamine (B-1) 500 mg in dextrose 5% 100 mL IVPB  500 mg Intravenous DAILY Arjun Callejas M.D. 200 mL/hr at 03/12/23 0534 500 mg at 03/12/23 0534    Followed by    [START ON 3/14/2023] thiamine (Vitamin B-1) tablet 100 mg  100 mg Oral DAILY Arjun Callejas M.D.        LORazepam (ATIVAN) injection 1-2 mg  1-2 mg Intravenous Q2HRS PRN Arjun Callejas M.D.        hydrALAZINE (APRESOLINE) injection 20 mg  20 mg Intravenous Q6HRS PRN Gabriel Whaley M.D.        labetalol (NORMODYNE/TRANDATE) injection 10-20 mg  10-20 mg Intravenous Q6HRS PRN Gabriel Whaley M.D.        ampicillin/sulbactam (UNASYN) 3 g in  mL IVPB  3 g Intravenous Q6HRS Gabriel Whaley M.D.   Stopped at 03/12/23 0603       Fluids    Intake/Output Summary (Last 24 hours) at 3/12/2023 1036  Last data filed at 3/12/2023 1028  Gross per 24 hour   Intake 2486.59 ml   Output 1705 ml   Net 781.59 ml       Laboratory          Recent Labs     03/11/23  0420 03/11/23  0951 03/11/23  1750 03/12/23  0020 03/12/23  0600   SODIUM 131*   < > 136 138 138   POTASSIUM 3.8  --   --   --  4.2   CHLORIDE 98  --   --   --  108   CO2 22  --   --   --  21   BUN 10  --   --   --  8   CREATININE 0.55  --   --   --  0.45*   MAGNESIUM 1.8  --   --   --  2.1   PHOSPHORUS 1.5*  --   --   --  3.2   CALCIUM 9.0  --   --   --  8.2*    < > = values in this interval not displayed.     Recent Labs     03/11/23  0420 03/12/23  0600   ALTSGPT 16 21   ASTSGOT 19 37   ALKPHOSPHAT 108* 75   TBILIRUBIN 0.6 0.4   GLUCOSE 101* 86     Recent Labs     03/11/23  0420 03/12/23  0600   WBC 20.0* 10.6   NEUTSPOLYS 90.10*  --    LYMPHOCYTES 2.60*  --    MONOCYTES 6.20  --    EOSINOPHILS 0.00  --    BASOPHILS 0.20  --    ASTSGOT 19 37   ALTSGPT 16 21   ALKPHOSPHAT 108* 75   TBILIRUBIN 0.6 0.4     Recent Labs     03/11/23  0420 03/12/23  0600   RBC 4.62* 3.87*   HEMOGLOBIN  15.5 13.1*   HEMATOCRIT 45.0 39.8*   PLATELETCT 307 230   PROTHROMBTM 13.2  --    INR 1.01  --        Imaging  CT:    Reviewed    Assessment/Plan  * Traumatic intracerebral hemorrhage, with unknown loss of consciousness status, initial encounter- (present on admission)  Assessment & Plan  Seen by trauma and neurosurgery  No intervention required  Bleeds are quite small    Ok to start dvt prophylaxis ~48 hours post stable CT  - this was obtained 3/11 @ 12pm (so safe to start 3/13 @ 12pm)    Needs MRI  Otherwise doing well, passed swallow eval, PT/OT, SW for discharge planning    Ok to transfer out of ICU    Seizure (HCC)  Assessment & Plan  No witnessed seizures in the hospital  Unclear if there was truly a seizure at home  More likely due to EtOH withdraw than these small bleeds    No indication keppra unless further seizures not attributed to EtOH withdraw    Alcohol dependence (HCC)  Assessment & Plan  No signs of withdraw here  Continue to monitor  Vitamins    Normal pressure hydrocephalus (HCC)- (present on admission)  Assessment & Plan  Has previously seen neurosurgery  They consulted this admission:  - treat sepsis  - get him through acute phase  - can get LP with opening pressure and therapeutic drainage. If this provides symptomatic improvement he will see in clinic for consideration of shunt    Sepsis (HCC)- (present on admission)  Assessment & Plan  This is Sepsis Present on admission  SIRS criteria identified on my evaluation include: Fever, with temperature greater than 101 deg F and Leukocytosis, with WBC greater than 12,000  Source is Unidentified  Sepsis protocol initiated  Fluid resuscitation ordered per protocol  Crystalloid Fluid Administration: Fluid resuscitation ordered per standard protocol - 30 mL/kg per current or ideal body weight  IV antibiotics as appropriate for source of sepsis  Reassessment: I have reassessed the patient's hemodynamic status    Cultures remain negative  Continue empiric  Unasyn  MRSA nares negative         VTE:   Hold for 24-48 hours post stable CT (obtained 3/11 @ 12pm)  Ulcer: Not Indicated  Lines: None    I have performed a physical exam and reviewed and updated ROS and Plan today (3/12/2023). In review of yesterday's note (3/11/2023), there are no changes except as documented above.     Discussed patient condition and risk of morbidity and/or mortality with Hospitalist, RN, RT, Pharmacy, , Charge nurse / hot rounds, and Patient    Ok to transfer patient out of ICU today. [facility] will sign off at transfer. Please call with questions.

## 2023-03-12 NOTE — ASSESSMENT & PLAN NOTE
Seen by trauma and neurosurgery  No intervention required  Bleeds are quite small    Ok to start dvt prophylaxis ~48 hours post stable CT  - this was obtained 3/11 @ 12pm (so safe to start 3/13 @ 12pm)    Needs MRI  Otherwise doing well, passed swallow ebenezer, PT/OT, SW for discharge planning    Ok to transfer out of ICU

## 2023-03-13 ENCOUNTER — APPOINTMENT (OUTPATIENT)
Dept: RADIOLOGY | Facility: MEDICAL CENTER | Age: 63
DRG: 086 | End: 2023-03-13
Attending: HOSPITALIST
Payer: MEDICAID

## 2023-03-13 ENCOUNTER — APPOINTMENT (OUTPATIENT)
Dept: RADIOLOGY | Facility: MEDICAL CENTER | Age: 63
DRG: 086 | End: 2023-03-13
Payer: MEDICAID

## 2023-03-13 LAB
ALBUMIN SERPL BCP-MCNC: 3.5 G/DL (ref 3.2–4.9)
ALBUMIN/GLOB SERPL: 1 G/DL
ALP SERPL-CCNC: 102 U/L (ref 30–99)
ALT SERPL-CCNC: 29 U/L (ref 2–50)
AMMONIA PLAS-SCNC: 23 UMOL/L (ref 11–45)
ANION GAP SERPL CALC-SCNC: 12 MMOL/L (ref 7–16)
AST SERPL-CCNC: 37 U/L (ref 12–45)
BACTERIA UR CULT: NORMAL
BILIRUB SERPL-MCNC: 0.4 MG/DL (ref 0.1–1.5)
BUN SERPL-MCNC: 5 MG/DL (ref 8–22)
CALCIUM ALBUM COR SERPL-MCNC: 9.6 MG/DL (ref 8.5–10.5)
CALCIUM SERPL-MCNC: 9.2 MG/DL (ref 8.5–10.5)
CHLORIDE SERPL-SCNC: 105 MMOL/L (ref 96–112)
CO2 SERPL-SCNC: 23 MMOL/L (ref 20–33)
CREAT SERPL-MCNC: 0.44 MG/DL (ref 0.5–1.4)
CRP SERPL HS-MCNC: 13.15 MG/DL (ref 0–0.75)
ERYTHROCYTE [DISTWIDTH] IN BLOOD BY AUTOMATED COUNT: 47.3 FL (ref 35.9–50)
GFR SERPLBLD CREATININE-BSD FMLA CKD-EPI: 121 ML/MIN/1.73 M 2
GLOBULIN SER CALC-MCNC: 3.4 G/DL (ref 1.9–3.5)
GLUCOSE BLD STRIP.AUTO-MCNC: 88 MG/DL (ref 65–99)
GLUCOSE SERPL-MCNC: 102 MG/DL (ref 65–99)
HCT VFR BLD AUTO: 44.2 % (ref 42–52)
HGB BLD-MCNC: 15.3 G/DL (ref 14–18)
MAGNESIUM SERPL-MCNC: 1.9 MG/DL (ref 1.5–2.5)
MCH RBC QN AUTO: 33.6 PG (ref 27–33)
MCHC RBC AUTO-ENTMCNC: 34.6 G/DL (ref 33.7–35.3)
MCV RBC AUTO: 97.1 FL (ref 81.4–97.8)
PHOSPHATE SERPL-MCNC: 2.1 MG/DL (ref 2.5–4.5)
PLATELET # BLD AUTO: 244 K/UL (ref 164–446)
PMV BLD AUTO: 9 FL (ref 9–12.9)
POTASSIUM SERPL-SCNC: 3.9 MMOL/L (ref 3.6–5.5)
PREALB SERPL-MCNC: 11.4 MG/DL (ref 18–38)
PROT SERPL-MCNC: 6.9 G/DL (ref 6–8.2)
RBC # BLD AUTO: 4.55 M/UL (ref 4.7–6.1)
SIGNIFICANT IND 70042: NORMAL
SITE SITE: NORMAL
SODIUM SERPL-SCNC: 140 MMOL/L (ref 135–145)
SOURCE SOURCE: NORMAL
WBC # BLD AUTO: 11.1 K/UL (ref 4.8–10.8)

## 2023-03-13 PROCEDURE — 82140 ASSAY OF AMMONIA: CPT

## 2023-03-13 PROCEDURE — 84100 ASSAY OF PHOSPHORUS: CPT

## 2023-03-13 PROCEDURE — 700105 HCHG RX REV CODE 258: Performed by: INTERNAL MEDICINE

## 2023-03-13 PROCEDURE — 700101 HCHG RX REV CODE 250: Performed by: STUDENT IN AN ORGANIZED HEALTH CARE EDUCATION/TRAINING PROGRAM

## 2023-03-13 PROCEDURE — 92526 ORAL FUNCTION THERAPY: CPT

## 2023-03-13 PROCEDURE — 82962 GLUCOSE BLOOD TEST: CPT

## 2023-03-13 PROCEDURE — 700111 HCHG RX REV CODE 636 W/ 250 OVERRIDE (IP): Performed by: INTERNAL MEDICINE

## 2023-03-13 PROCEDURE — 700105 HCHG RX REV CODE 258: Performed by: HOSPITALIST

## 2023-03-13 PROCEDURE — 700111 HCHG RX REV CODE 636 W/ 250 OVERRIDE (IP): Performed by: STUDENT IN AN ORGANIZED HEALTH CARE EDUCATION/TRAINING PROGRAM

## 2023-03-13 PROCEDURE — 700111 HCHG RX REV CODE 636 W/ 250 OVERRIDE (IP): Performed by: HOSPITALIST

## 2023-03-13 PROCEDURE — 86140 C-REACTIVE PROTEIN: CPT

## 2023-03-13 PROCEDURE — 770000 HCHG ROOM/CARE - INTERMEDIATE ICU *

## 2023-03-13 PROCEDURE — 71045 X-RAY EXAM CHEST 1 VIEW: CPT

## 2023-03-13 PROCEDURE — 700101 HCHG RX REV CODE 250: Performed by: HOSPITALIST

## 2023-03-13 PROCEDURE — 700105 HCHG RX REV CODE 258: Performed by: STUDENT IN AN ORGANIZED HEALTH CARE EDUCATION/TRAINING PROGRAM

## 2023-03-13 PROCEDURE — 700111 HCHG RX REV CODE 636 W/ 250 OVERRIDE (IP)

## 2023-03-13 PROCEDURE — 84134 ASSAY OF PREALBUMIN: CPT

## 2023-03-13 PROCEDURE — 83735 ASSAY OF MAGNESIUM: CPT

## 2023-03-13 PROCEDURE — 80053 COMPREHEN METABOLIC PANEL: CPT

## 2023-03-13 PROCEDURE — 85027 COMPLETE CBC AUTOMATED: CPT

## 2023-03-13 PROCEDURE — 92612 ENDOSCOPY SWALLOW (FEES) VID: CPT

## 2023-03-13 PROCEDURE — 99233 SBSQ HOSP IP/OBS HIGH 50: CPT | Performed by: HOSPITALIST

## 2023-03-13 RX ORDER — LORAZEPAM 2 MG/ML
1.5 INJECTION INTRAMUSCULAR
Status: DISCONTINUED | OUTPATIENT
Start: 2023-03-13 | End: 2023-03-15

## 2023-03-13 RX ORDER — LORAZEPAM 2 MG/ML
2 INJECTION INTRAMUSCULAR
Status: DISCONTINUED | OUTPATIENT
Start: 2023-03-13 | End: 2023-03-15

## 2023-03-13 RX ORDER — LORAZEPAM 2 MG/1
2 TABLET ORAL
Status: DISCONTINUED | OUTPATIENT
Start: 2023-03-13 | End: 2023-03-15

## 2023-03-13 RX ORDER — HALOPERIDOL 5 MG/ML
5 INJECTION INTRAMUSCULAR EVERY 4 HOURS PRN
Status: DISCONTINUED | OUTPATIENT
Start: 2023-03-13 | End: 2023-03-15

## 2023-03-13 RX ORDER — LORAZEPAM 2 MG/ML
0.5 INJECTION INTRAMUSCULAR EVERY 4 HOURS PRN
Status: DISCONTINUED | OUTPATIENT
Start: 2023-03-13 | End: 2023-03-15

## 2023-03-13 RX ORDER — DEXMEDETOMIDINE HYDROCHLORIDE 4 UG/ML
INJECTION, SOLUTION INTRAVENOUS
Status: ACTIVE
Start: 2023-03-13 | End: 2023-03-14

## 2023-03-13 RX ORDER — LORAZEPAM 2 MG/1
4 TABLET ORAL
Status: DISCONTINUED | OUTPATIENT
Start: 2023-03-13 | End: 2023-03-15

## 2023-03-13 RX ORDER — LORAZEPAM 1 MG/1
1 TABLET ORAL EVERY 4 HOURS PRN
Status: DISCONTINUED | OUTPATIENT
Start: 2023-03-13 | End: 2023-03-15

## 2023-03-13 RX ORDER — LORAZEPAM 2 MG/ML
1 INJECTION INTRAMUSCULAR
Status: DISCONTINUED | OUTPATIENT
Start: 2023-03-13 | End: 2023-03-15

## 2023-03-13 RX ORDER — DEXMEDETOMIDINE HYDROCHLORIDE 4 UG/ML
.1-1.5 INJECTION, SOLUTION INTRAVENOUS CONTINUOUS
Status: DISCONTINUED | OUTPATIENT
Start: 2023-03-13 | End: 2023-03-14

## 2023-03-13 RX ORDER — LORAZEPAM 1 MG/1
0.5 TABLET ORAL EVERY 4 HOURS PRN
Status: DISCONTINUED | OUTPATIENT
Start: 2023-03-13 | End: 2023-03-15

## 2023-03-13 RX ORDER — MAGNESIUM SULFATE HEPTAHYDRATE 40 MG/ML
2 INJECTION, SOLUTION INTRAVENOUS ONCE
Status: COMPLETED | OUTPATIENT
Start: 2023-03-13 | End: 2023-03-13

## 2023-03-13 RX ORDER — HALOPERIDOL 5 MG/ML
INJECTION INTRAMUSCULAR
Status: COMPLETED
Start: 2023-03-13 | End: 2023-03-13

## 2023-03-13 RX ADMIN — AMPICILLIN AND SULBACTAM 3 G: 1; 2 INJECTION, POWDER, FOR SOLUTION INTRAMUSCULAR; INTRAVENOUS at 06:27

## 2023-03-13 RX ADMIN — HYDRALAZINE HYDROCHLORIDE 20 MG: 20 INJECTION INTRAMUSCULAR; INTRAVENOUS at 03:52

## 2023-03-13 RX ADMIN — AMPICILLIN AND SULBACTAM 3 G: 1; 2 INJECTION, POWDER, FOR SOLUTION INTRAMUSCULAR; INTRAVENOUS at 13:21

## 2023-03-13 RX ADMIN — HYDRALAZINE HYDROCHLORIDE 20 MG: 20 INJECTION INTRAMUSCULAR; INTRAVENOUS at 15:32

## 2023-03-13 RX ADMIN — LORAZEPAM 2 MG: 2 INJECTION INTRAMUSCULAR; INTRAVENOUS at 01:39

## 2023-03-13 RX ADMIN — MAGNESIUM SULFATE HEPTAHYDRATE 2 G: 40 INJECTION, SOLUTION INTRAVENOUS at 08:39

## 2023-03-13 RX ADMIN — LORAZEPAM 1 MG: 2 INJECTION INTRAMUSCULAR; INTRAVENOUS at 17:48

## 2023-03-13 RX ADMIN — AMPICILLIN AND SULBACTAM 3 G: 1; 2 INJECTION, POWDER, FOR SOLUTION INTRAMUSCULAR; INTRAVENOUS at 18:45

## 2023-03-13 RX ADMIN — AMPICILLIN AND SULBACTAM 3 G: 1; 2 INJECTION, POWDER, FOR SOLUTION INTRAMUSCULAR; INTRAVENOUS at 23:59

## 2023-03-13 RX ADMIN — LORAZEPAM 1 MG: 2 INJECTION INTRAMUSCULAR; INTRAVENOUS at 05:57

## 2023-03-13 RX ADMIN — DEXMEDETOMIDINE 0.2 MCG/KG/HR: 200 INJECTION, SOLUTION INTRAVENOUS at 17:11

## 2023-03-13 RX ADMIN — THIAMINE HYDROCHLORIDE 500 MG: 100 INJECTION, SOLUTION INTRAMUSCULAR; INTRAVENOUS at 05:47

## 2023-03-13 RX ADMIN — LABETALOL HYDROCHLORIDE 20 MG: 5 INJECTION INTRAVENOUS at 04:04

## 2023-03-13 RX ADMIN — LORAZEPAM 1 MG: 2 INJECTION INTRAMUSCULAR; INTRAVENOUS at 03:45

## 2023-03-13 RX ADMIN — HALOPERIDOL LACTATE 5 MG: 5 INJECTION, SOLUTION INTRAMUSCULAR at 02:44

## 2023-03-13 RX ADMIN — POTASSIUM PHOSPHATE, MONOBASIC AND POTASSIUM PHOSPHATE, DIBASIC 15 MMOL: 224; 236 INJECTION, SOLUTION, CONCENTRATE INTRAVENOUS at 08:39

## 2023-03-13 RX ADMIN — LORAZEPAM 0.5 MG: 2 INJECTION INTRAMUSCULAR; INTRAVENOUS at 12:58

## 2023-03-13 RX ADMIN — LORAZEPAM 1.5 MG: 2 INJECTION INTRAMUSCULAR; INTRAVENOUS at 09:04

## 2023-03-13 RX ADMIN — HALOPERIDOL LACTATE 5 MG: 5 INJECTION, SOLUTION INTRAMUSCULAR at 14:52

## 2023-03-13 ASSESSMENT — LIFESTYLE VARIABLES
AUDITORY DISTURBANCES: NOT PRESENT
TOTAL SCORE: 12
TOTAL SCORE: 5
TREMOR: TREMOR NOT VISIBLE BUT CAN BE FELT, FINGERTIP TO FINGERTIP
ANXIETY: MILDLY ANXIOUS
ORIENTATION AND CLOUDING OF SENSORIUM: DATE DISORIENTATION BY MORE THAN TWO CALENDAR DAYS
TOTAL SCORE: 2
TREMOR: *
AUDITORY DISTURBANCES: NOT PRESENT
HEADACHE, FULLNESS IN HEAD: NOT PRESENT
PAROXYSMAL SWEATS: BARELY PERCEPTIBLE SWEATING. PALMS MOIST
VISUAL DISTURBANCES: NOT PRESENT
VISUAL DISTURBANCES: NOT PRESENT
ANXIETY: MILDLY ANXIOUS
NAUSEA AND VOMITING: NO NAUSEA AND NO VOMITING
TOTAL SCORE: 9
AGITATION: *
NAUSEA AND VOMITING: NO NAUSEA AND NO VOMITING
TREMOR: NO TREMOR
ORIENTATION AND CLOUDING OF SENSORIUM: DATE DISORIENTATION BY NO MORE THAN TWO CALENDAR DAYS
PAROXYSMAL SWEATS: NO SWEAT VISIBLE
ORIENTATION AND CLOUDING OF SENSORIUM: CANNOT DO SERIAL ADDITIONS OR IS UNCERTAIN ABOUT DATE
ANXIETY: MODERATELY ANXIOUS OR GUARDED, SO ANXIETY IS INFERRED
TREMOR: NO TREMOR
VISUAL DISTURBANCES: NOT PRESENT
AUDITORY DISTURBANCES: NOT PRESENT
AGITATION: *
ORIENTATION AND CLOUDING OF SENSORIUM: DATE DISORIENTATION BY NO MORE THAN TWO CALENDAR DAYS
HEADACHE, FULLNESS IN HEAD: SEVERE
PAROXYSMAL SWEATS: NO SWEAT VISIBLE
NAUSEA AND VOMITING: NO NAUSEA AND NO VOMITING
NAUSEA AND VOMITING: NO NAUSEA AND NO VOMITING
PAROXYSMAL SWEATS: NO SWEAT VISIBLE
ANXIETY: *
NAUSEA AND VOMITING: NO NAUSEA AND NO VOMITING
ORIENTATION AND CLOUDING OF SENSORIUM: DISORIENTED FOR PLACE AND / OR PERSON
TOTAL SCORE: VERY MILD ITCHING, PINS AND NEEDLES SENSATION, BURNING OR NUMBNESS
VISUAL DISTURBANCES: NOT PRESENT
ANXIETY: NO ANXIETY (AT EASE)
PAROXYSMAL SWEATS: NO SWEAT VISIBLE
AUDITORY DISTURBANCES: NOT PRESENT
NAUSEA AND VOMITING: NO NAUSEA AND NO VOMITING
TREMOR: TREMOR NOT VISIBLE BUT CAN BE FELT, FINGERTIP TO FINGERTIP
ORIENTATION AND CLOUDING OF SENSORIUM: ORIENTED AND CAN DO SERIAL ADDITIONS
VISUAL DISTURBANCES: NOT PRESENT
HEADACHE, FULLNESS IN HEAD: MODERATE
ORIENTATION AND CLOUDING OF SENSORIUM: DATE DISORIENTATION BY MORE THAN TWO CALENDAR DAYS
NAUSEA AND VOMITING: NO NAUSEA AND NO VOMITING
NAUSEA AND VOMITING: MILD NAUSEA WITH NO VOMITING
DOES PATIENT WANT TO STOP DRINKING: CANNOT ASSESS
AGITATION: *
AGITATION: MODERATELY FIDGETY AND RESTLESS
AUDITORY DISTURBANCES: NOT PRESENT
HEADACHE, FULLNESS IN HEAD: NOT PRESENT
ANXIETY: MODERATELY ANXIOUS OR GUARDED, SO ANXIETY IS INFERRED
HEADACHE, FULLNESS IN HEAD: NOT PRESENT
AGITATION: NORMAL ACTIVITY
TREMOR: TREMOR NOT VISIBLE BUT CAN BE FELT, FINGERTIP TO FINGERTIP
ALCOHOL_USE: YES
HEADACHE, FULLNESS IN HEAD: NOT PRESENT
ANXIETY: *
PAROXYSMAL SWEATS: NO SWEAT VISIBLE
TOTAL SCORE: 14
AUDITORY DISTURBANCES: NOT PRESENT
AGITATION: *
HEADACHE, FULLNESS IN HEAD: NOT PRESENT
VISUAL DISTURBANCES: MODERATELY SEVERE HALLUCINATIONS
VISUAL DISTURBANCES: NOT PRESENT
HEADACHE, FULLNESS IN HEAD: VERY MILD
ORIENTATION AND CLOUDING OF SENSORIUM: CANNOT DO SERIAL ADDITIONS OR IS UNCERTAIN ABOUT DATE
AUDITORY DISTURBANCES: NOT PRESENT
TREMOR: NO TREMOR
TOTAL SCORE: 14
AGITATION: *
TOTAL SCORE: 19
VISUAL DISTURBANCES: NOT PRESENT
PAROXYSMAL SWEATS: BARELY PERCEPTIBLE SWEATING. PALMS MOIST
TREMOR: NO TREMOR
TOTAL SCORE: 5
AUDITORY DISTURBANCES: NOT PRESENT
AGITATION: SOMEWHAT MORE THAN NORMAL ACTIVITY
ANXIETY: *
PAROXYSMAL SWEATS: NO SWEAT VISIBLE

## 2023-03-13 ASSESSMENT — PAIN DESCRIPTION - PAIN TYPE
TYPE: ACUTE PAIN

## 2023-03-13 ASSESSMENT — FIBROSIS 4 INDEX
FIB4 SCORE: 2.11
FIB4 SCORE: 1.69
FIB4 SCORE: 1.69

## 2023-03-13 NOTE — CARE PLAN
The patient is Stable - Low risk of patient condition declining or worsening    Shift Goals  Clinical Goals: Stable neuro  Patient Goals: Rest  Family Goals: LIN    Progress made toward(s) clinical / shift goals:    Problem: Optimal Care for Alcohol Withdrawal  Goal: Optimal Care for the alcohol withdrawal patient  3/13/2023 0644 by Christa Patel R.N.  Outcome: Progressing  3/13/2023 0529 by Christa Patel R.N.  Outcome: Progressing     Problem: Skin Integrity  Goal: Skin integrity is maintained or improved  Outcome: Progressing     Problem: Fall Risk  Goal: Patient will remain free from falls  Outcome: Progressing     Problem: Safety - Medical Restraint  Goal: Remains free of injury from restraints (Restraint for Interference with Medical Device)  Outcome: Progressing       Patient is not progressing towards the following goals:

## 2023-03-13 NOTE — ASSESSMENT & PLAN NOTE
Repeat head CT 3/15/2023 showing stable left intraparenchymal hemorrhage.    Neurosurgery not recommending surgical intervention  Continue fall precautions  Neurochecks every 4 hours  Outpatient follow-up with neurosurgery    Patient is medically cleared for discharge to a skilled nursing facility

## 2023-03-13 NOTE — ASSESSMENT & PLAN NOTE
Failed repeat swallow. NGT placed and enteral feeding started on March 13, 2023    Speech therapy has advance patient's diet to minced.  Tolerating well without signs of aspiration.  Continue aspiration precautions

## 2023-03-13 NOTE — THERAPY
"Speech Language Pathology   Daily Treatment     Patient Name: James Samaniego  AGE:  62 y.o., SEX:  male  Medical Record #: [id number]  Date of Service: 3/13/2023      Precautions:  Precautions: Fall Risk, Swallow Precautions         Subjective  When asked if he frequently coughed when eating/drinking he said \"all the time\".      Assessment  Pt seen on this date for a high follow up. Per RN, pt unable to tolerate PO without overt coughing and was made NPO overnight. Pt oriented to self only. Pt's speech is very dysarthric and only ~5-10% intelligible (a change from yesterday per RN). Immediate coughing/choking noted with sips of thin liquids which is highly concerning for penetration/aspiration. Pt unable to feed self and required 1:1 feeding. Audible wet breath sounds noted prior to and after PO trials.       Clinical Impressions  Pt is presenting with s/sx concerning for aspiration and recommend NPO with FEES to further assess swallow function. Pt consented to procedure and will plan to complete later today.      Recommendations  NPO pending FEES                 Anticipated Discharge Needs  Discharge Recommendations: Recommend post-acute placement for additional speech therapy services prior to discharge home  Therapy Recommendations Upon DC: Dysphagia Training, Patient / Family / Caregiver Education, Community Re-Integration      Patient / Family Goals  Patient / Family Goal #1: \"it hurts to breathe sometimes.\"  Goal #1 Outcome: Progressing slower than expected  Short Term Goals  Short Term Goal # 1: Patient will be able to consume LQ3/MT2 diet with no overt S/Sx of aspiration or changes in respiratory status.  Goal Outcome # 1: Progressing slower than expected  Short Term Goal # 2: Pt will be able to consume MM5/SB6/TN0 textures with SLP only with no overt S/Sx of aspiration noted.  Goal Outcome # 2 : Goal not met      BEULAH Johnson  "

## 2023-03-13 NOTE — CARE PLAN
The patient is Stable - Low risk of patient condition declining or worsening    Shift Goals  Clinical Goals: Rest, stable neuro checks  Patient Goals: rest and comfort  Family Goals: olinda    Progress made toward(s) clinical / shift goals:      Problem: Knowledge Deficit - Standard  Goal: Patient and family/care givers will demonstrate understanding of plan of care, disease process/condition, diagnostic tests and medications  Outcome: Progressing     Problem: Psychosocial  Goal: Patient's level of anxiety will decrease  Outcome: Progressing     Problem: Skin Integrity  Goal: Skin integrity is maintained or improved  Outcome: Progressing     Problem: Psychosocial  Goal: Patient's level of anxiety will decrease  Outcome: Progressing       Patient is not progressing towards the following goals:

## 2023-03-13 NOTE — RESPIRATORY CARE
COPD EDUCATION by COPD CLINICAL EDUCATOR  3/13/2023 at 3:42 PM by Betty Dee RRT     Patient reviewed by COPD education team. Patient does not have a formal diagnosis of COPD, has no pulmonary function testing on file, and takes no respiratory medications. Patient is currently not appropriate for smoking cessation education. Therefore the patient does not qualify for the COPD program.

## 2023-03-13 NOTE — DISCHARGE PLANNING
Case Management Discharge Planning    Admission Date: 3/11/2023  GMLOS: 5  ALOS: 2    6-Clicks ADL Score:    6-Clicks Mobility Score:    PT and/or OT Eval ordered: Yes  Post-acute Referrals Ordered: Yes  Post-acute Choice Obtained: No  Has referral(s) been sent to post-acute provider:  No      Anticipated Discharge Dispo: Discharge Disposition:   D/T to SNF with medicare cert w/planned hosp IP readmit (83)      Action(s) Taken:   Assessment completed via chart review.  He was last admitted to [facility] in July 2023.  Patient is homeless and stays at the [facility].  He uses a walker.  Per epic, patient AOx1.  NOK search requested and no NOK found.  RN MARIO left vm with [facility] with request to return call.    Medically Clear: No    Next Steps: Follow up with [facility].  Speak to patient tomorrow.    Barriers to Discharge: Medical clearance    Care Transition Team Assessment    Information Source  Orientation Level: Oriented to person, Disoriented to place, Disoriented to time, Disoriented to situation    Readmission Evaluation  Is this a readmission?: No    Elopement Risk  Legal Hold: No  Ambulatory or Self Mobile in Wheelchair: No-Not an Elopement Risk  Disoriented: Place-At Risk for Elopement, Time-At Risk for Elopement, Situation-At Risk for Elopement  Elopement Risk: Not at Risk for Elopement  Picture of Patient on Inside Chart Front Cover: Yes  Purple Armband on Patient: Yes    Interdisciplinary Discharge Planning  Lives with - Patient's Self Care Capacity: Alone and Unable to Care For Self  Patient or legal guardian wants to designate a caregiver: No  Support Systems: Friends / Neighbors, Shelter  Housing / Facility: Homeless  Durable Medical Equipment: Walker    Discharge Preparedness  What is your plan after discharge?: Uncertain - pending medical team collaboration  Prior Functional Level: Ambulatory, Independent with Activities of Daily Living, Independent with Medication Management, Uses  Walker  Difficulity with ADLs: Bathing, Dressing, Toileting, Walking, Eating, Brushing teeth  Difficulity with IADLs: Cooking, Driving, Keeping track of finances, Laundry, Managing medication, Shopping, Using the telephone or computer    Functional Assesment  Prior Functional Level: Ambulatory, Independent with Activities of Daily Living, Independent with Medication Management, Uses Walker    Finances  Financial Barriers to Discharge: No  Prescription Coverage: Yes    Vision / Hearing Impairment  Vision Impairment : No  Hearing Impairment : No         Advance Directive  Advance Directive?: None    Domestic Abuse  Have you ever been the victim of abuse or violence?: No  Physical Abuse or Sexual Abuse: No  Verbal Abuse or Emotional Abuse: No  Possible Abuse/Neglect Reported to:: Not Applicable    Psychological Assessment  History of Substance Abuse: Alcohol    Discharge Risks or Barriers  Discharge risks or barriers?: No PCP, Uninsured / underinsured, Substance abuse, Homeless / couch surfing  Patient risk factors: Cognitive / sensory / physical deficit, Lack of outside supports, No PCP, Substance abuse, Uninsured or underinsured    Anticipated Discharge Information  Discharge Disposition: D/T to SNF with medicare cert w/planned hosp IP readmit (83)

## 2023-03-13 NOTE — ASSESSMENT & PLAN NOTE
CXR underwhelming for any signs of infection and radiology does not mention any signs of pneumonia.  Minimally elevated procalcitonin on admission  Suspect WBC on 3/11 was leukemoid reaction  Patient had improved WBC 3/12  Discontinued antibiotic on March 14, 2023 as he is afebrile and white blood cell count is within normal limits.  Urine analysis unremarkable blood culture showed no growth

## 2023-03-13 NOTE — ASSESSMENT & PLAN NOTE
Not on any ASD  Drug screen positive for cannabis only  alcohol withdrawal as etiology?  Status post contusion to the left frontal head watch for any postconcussion syndrome  monitor

## 2023-03-13 NOTE — PROGRESS NOTES
4 Eyes Skin Assessment Completed by HARSHAL Kiser and HARSHAL Willoughby.      Head Scab  Ears WDL  Nose WDL  Mouth WDL  Neck Scar  Breast/Chest Scar  Shoulder Blades WDL  Spine Redness  (R) Arm/Elbow/Hand Scab and Scar  (L) Arm/Elbow/Hand Scab and Scar  Abdomen WDL  Groin Redness  Scrotum/Coccyx/Buttocks Scar  (R) Leg Scar and Scab  (L) Leg Scar and Scab  (R) Heel/Foot/Toe Scar and Scab  (L) Heel/Foot/Toe Scar and Scab          Devices In Places Blood Pressure Cuff, Pulse Ox, and OG/NG, Nasal Cannula, Restraints      Interventions In Place Gray Ear Foams, Sacral Mepilex, Pillows, Q2 Turns, and Low Air Loss Mattress    Possible Skin Injury No    Pictures Uploaded Into Epic N/A  Wound Consult Placed N/A  RN Wound Prevention Protocol Ordered No

## 2023-03-13 NOTE — ASSESSMENT & PLAN NOTE
Neurosurgery aware.  Will follow up in clinic for possible shunt.  PT/OT  Patient uses walker  CT head reviewed he has more posterior ventricle enlargement in the anterior region.  He has marked atrophy as well of his brain more so than I do suspect for his age question of this is somewhat from alcohol use.    Neurosurgery recommended outpatient follow-up for possible  shunt placement.

## 2023-03-13 NOTE — PROGRESS NOTES
Neurosurgery Progress Note    Subjective:  Patient still spontaneous and agitated, in restraint vest.    Exam:  Alert, oriented x 1  Perrl  Follows commands and has no drift.   Attempting to get out of bed, but not sure where he is going      BP  Min: 132/81  Max: 211/120  Pulse  Av.2  Min: 59  Max: 109  Resp  Av.8  Min: 23  Max: 44  Temp  Av.3 °C (97.3 °F)  Min: 36.1 °C (97 °F)  Max: 36.4 °C (97.5 °F)  Monitored Temp 2  Av.3 °C (97.3 °F)  Min: 34.9 °C (94.8 °F)  Max: 37 °C (98.6 °F)  SpO2  Av.4 %  Min: 92 %  Max: 98 %    No data recorded    Recent Labs     23  0420 23  0600 23  0323   WBC 20.0* 10.6 11.1*   RBC 4.62* 3.87* 4.55*   HEMOGLOBIN 15.5 13.1* 15.3   HEMATOCRIT 45.0 39.8* 44.2   MCV 97.4 102.8* 97.1   MCH 33.5* 33.9* 33.6*   MCHC 34.4 32.9* 34.6   RDW 47.8 51.9* 47.3   PLATELETCT 307 230 244   MPV 9.1 9.6 9.0       Recent Labs     23  0420 23  0951 23  0020 23  0600 23  0323   SODIUM 131*   < > 138 138 140   POTASSIUM 3.8  --   --  4.2 3.9   CHLORIDE 98  --   --  108 105   CO2 22  --   --  21 23   GLUCOSE 101*  --   --  86 102*   BUN 10  --   --  8 5*   CREATININE 0.55  --   --  0.45* 0.44*   CALCIUM 9.0  --   --  8.2* 9.2    < > = values in this interval not displayed.       Recent Labs     23  0420   INR 1.01       Recent Labs     23  0614   REACTMIN 2.8*   CLOTKINET 1.1   CLOTANGL 74.9   MAXCLOTS 60.4   [id number] 1.0   PRCINADP 95.1*   PRCINAA 24.7*         Intake/Output                         23 07 - 23 0659 23 - 23 Total  Total                 Intake    P.O.  115  960 1075  --  -- --    P.O.  -- -- --    I.V.  150  -- 150  --  -- --    Volume (mL) (NS infusion) 150 -- 150 -- -- --    Total Intake  -- -- --       Output    Urine  900  4943 9775  --  -- --    Number of Times Voided -- 9 x 9 x -- -- --    Number of  Times Incontinent of Urine -- 3 x 3 x -- -- --    Urine Void (mL) -- 0696 2715 -- -- --    Output (mL) ([REMOVED] Urethral Catheter 16 Fr. 03/12/23 1604) 900 -- 900 -- -- --    Stool  --  -- --  --  -- --    Number of Times Stooled 1 x 2 x 3 x -- -- --    Total Output 900 1675 2575 -- -- --       Net I/O     -500 -710 -4650 -- -- --              Intake/Output Summary (Last 24 hours) at 3/13/2023 0935  Last data filed at 3/13/2023 0200  Gross per 24 hour   Intake 1225.02 ml   Output 2425 ml   Net -1199.98 ml               haloperidol lactate  5 mg Q4HRS PRN    LORazepam  0.5 mg Q4HRS PRN    LORazepam  1 mg Q4HRS PRN    Or    LORazepam  0.5 mg Q4HRS PRN    LORazepam  2 mg Q2HRS PRN    Or    LORazepam  1 mg Q2HRS PRN    LORazepam  3 mg Q HOUR PRN    Or    LORazepam  1.5 mg Q HOUR PRN    LORazepam  4 mg Q15 MIN PRN    Or    LORazepam  2 mg Q15 MIN PRN    potassium phosphate IVPB (CUSTOM)  15 mmol Once    magnesium sulfate  2 g Once    acetaminophen  650 mg Q4HRS PRN    Or    acetaminophen  650 mg Q4HRS PRN    LORazepam  4 mg Q10 MIN PRN    Respiratory Therapy Consult   Continuous RT    ondansetron  4 mg Q4HRS PRN    Or    ondansetron  4 mg Q4HRS PRN    MD Alert...Adult ICU Electrolyte Replacement per Pharmacy   PHARMACY TO DOSE    [START ON 3/14/2023] thiamine  100 mg DAILY    hydrALAZINE  20 mg Q6HRS PRN    labetalol  10-20 mg Q6HRS PRN    ampicillin-sulbactam (UNASYN) IV  3 g Q6HRS       Assessment and Plan:  Hospital day #3   POD #na  Prophylactic anticoagulation: no         Start date/time: 3/14    Patient with frequent falls, etoh abuse. Had witnessed seizure where he hit his head. Has small frontal contusions, stable on follow up CT  He has large ventricles and probably has a component of hydrocephalus.   We have considered placing a shunt in the past but ability to follow up with this gentleman in quite limited.  He can go to q 4 neuro checks and can go to the floor once stable.   Outpt follow up to discuss VPS  once he is normalized  No Nsx plans in current state

## 2023-03-13 NOTE — CONSULTS
Hospital Medicine Consultation    Date of Service  3/12/2023    Referring Physician  Gabriel Whaley M.D.    Consulting Physician  Arjun Alejandro D.O.    Reason for Consultation  Transfer of care out of ICU for recent surgery and traumatic intracerebral hemorrhage    History of Presenting Illness  James Samaniego is a homeless 62 y.o. male with tobacco use, hyperthyroid, normal pressure hydrocephalus, and EtOH use who presented 3/11/2023 with a witnessed seizure x 1 minute.  He hit the left front of his head.  A CT head showed multiple small left frontal parenchymal hemorrhages. A CT cervical spine was negative for acute finding. Neurosurgery consulted and stated he has longstanding normal pressure hydrocephalus and was suppose to see neurosurgery last year but did not follow up. Neurosurgery did not feel any surgical intervention needed for the small intraparenchymal hemorrhages were needed. The patient is having some swallowing difficulty and failed a regular diet on swallow evaluation by Speech therapist but is on adjusted diet per speech.    3/12 Patient awake and alert. No acute distress.  Knows he is in [place] and in the hospital. States he has been in [place] for about a year.  He lives in a shelter.    Review of Systems  ROS    Past Medical History   has a past medical history of Hypothyroid.  Possible Normal pressure hydrocephalus    Surgical History   has no past surgical history on file.  Cataract surgery    Family History  Mother make be alive   Father     Social History   reports that he has been smoking. He uses smokeless tobacco. He reports current alcohol use. He reports that he does not currently use drugs. Homeless, single. No kids.    Medications  Current Facility-Administered Medications   Medication Dose Route Frequency Provider Last Rate Last Admin    acetaminophen (Tylenol) tablet 650 mg  650 mg Oral Q4HRS PRN Gabriel Whaley M.D.   650 mg at 23 1607    Or    acetaminophen (TYLENOL)  suppository 650 mg  650 mg Rectal Q4HRS PRN Gabriel Whaley M.D.        LORazepam (ATIVAN) injection 4 mg  4 mg Intravenous Q10 MIN PRN Arjun Callejas M.D.        Respiratory Therapy Consult   Nebulization Continuous RT Arjun Callejas M.D.        ondansetron (ZOFRAN ODT) dispertab 4 mg  4 mg Oral Q4HRS PRN Arjun Callejas M.D.        Or    ondansetron (ZOFRAN) syringe/vial injection 4 mg  4 mg Intravenous Q4HRS PRN Arjun Callejas M.D. MD Alert...ICU Electrolyte Replacement per Pharmacy   Other PHARMACY TO DOSE Arjun Callejas M.D.        thiamine (B-1) 500 mg in dextrose 5% 100 mL IVPB  500 mg Intravenous DAILY Arjun Callejas M.D. 200 mL/hr at 03/12/23 0534 500 mg at 03/12/23 0534    Followed by    [START ON 3/14/2023] thiamine (Vitamin B-1) tablet 100 mg  100 mg Oral DAILY Arjun Callejas M.D.        LORazepam (ATIVAN) injection 1-2 mg  1-2 mg Intravenous Q2HRS PRN Arjun Callejas M.D.        hydrALAZINE (APRESOLINE) injection 20 mg  20 mg Intravenous Q6HRS PRN Gabriel Whaley M.D.        labetalol (NORMODYNE/TRANDATE) injection 10-20 mg  10-20 mg Intravenous Q6HRS PRN Gabriel Whaley M.D.        ampicillin/sulbactam (UNASYN) 3 g in  mL IVPB  3 g Intravenous Q6HRS Gabriel Whaley M.D.   Stopped at 03/12/23 1201       Allergies  No Known Allergies    Physical Exam  Temp:  [36.4 °C (97.5 °F)] 36.4 °C (97.5 °F)  Pulse:  [54-85] 81  Resp:  [20-82] 32  BP: ()/(60-89) 135/68  SpO2:  [92 %-99 %] 94 %    Physical Exam    Fluids  Date 03/12/23 0700 - 03/13/23 0659   Shift 9577-2460 0129-3429 9813-8199 24 Hour Total   INTAKE   P.O. 115   115   I.V. 150   150   Shift Total 265   265   OUTPUT   Urine 900   900   Shift Total 900   900   Weight (kg) 60 60 60 60       Laboratory  Recent Labs     03/11/23  0420 03/12/23  0600   WBC 20.0* 10.6   RBC 4.62* 3.87*   HEMOGLOBIN 15.5 13.1*   HEMATOCRIT 45.0 39.8*   MCV 97.4 102.8*   MCH 33.5* 33.9*   MCHC 34.4  32.9*   RDW 47.8 51.9*   PLATELETCT 307 230   MPV 9.1 9.6     Recent Labs     03/11/23  0420 03/11/23  0951 03/11/23  1750 03/12/23  0020 03/12/23  0600   SODIUM 131*   < > 136 138 138   POTASSIUM 3.8  --   --   --  4.2   CHLORIDE 98  --   --   --  108   CO2 22  --   --   --  21   GLUCOSE 101*  --   --   --  86   BUN 10  --   --   --  8   CREATININE 0.55  --   --   --  0.45*   CALCIUM 9.0  --   --   --  8.2*    < > = values in this interval not displayed.     Recent Labs     03/11/23  0420   INR 1.01                 Imaging  [id number] VIEW   Final Result      There is no evidence of implanted electronic device in the abdomen or pelvis                  CT-HEAD W/O   Final Result      1.  No significant interval change in small left frontal parenchymal hemorrhages measuring up to 5 mm.      2.  Persistent prominence of the ventricles could be related to central atrophy versus normal pressure hydrocephalus.         CT-CSPINE WITH PLUS RECONS   Final Result         1.  Multilevel degenerative changes of the cervical spine limit diagnostic sensitivity of this examination, otherwise no acute traumatic bony injury of the cervical spine is apparent.   2.  Atherosclerosis      CT-HEAD W/O   Final Result         1.  Multiple small left frontal parenchymal hemorrhages measuring up to 4.6 mm.   2.  Moderate bilateral ventricular dilatation, may represent ex vacuo changes, consider component of normal pressure hydrocephalus as clinically appropriate. Appears overall stable since prior study.   3.  Nonspecific white matter changes, commonly associated with small vessel ischemic disease.  Associated mild cerebral atrophy is noted.   4.  Mild bilateral and frontal and ethmoid sinusitis changes   5.  Atherosclerosis.      Based solely on CT findings, the brain injury guideline category is mBIG 3.      EDH   IVH   Displaced skull fx   SDH > 8mm   IPH > 8mm or multiple   SAH bi-hemispheric or > 3mm      The original BIG  retrospective analysis found radiographic progression in 0% of BIG 1 patients and 2.6% BIG 2.      These findings were discussed with the patient's clinician, Arjun Ramos, on 3/11/2023 5:21 AM.      DX-CHEST-PORTABLE (1 VIEW)   Final Result         1.  No acute cardiopulmonary disease.   2.  Atherosclerosis      MR-BRAIN-W/O    (Results Pending)       Assessment/Plan  * Traumatic intracerebral hemorrhage, with unknown loss of consciousness status, initial encounter- (present on admission)  Assessment & Plan  Trauma surgery signed off  Neurosurgery not recommending surgical intervention  Avoid NSAIDS, anticoagulation  Fall precautions.  Walker  PT/OT  neurochecks q 4 hrs and transfer to neuro floor    Dysphagia  Assessment & Plan  Speech therapy evaluated patient 3/12 patient has patient on modified diet  Aspiration precautions  Possible fees study if he continues to not do well with his swallowing    Hyponatremia  Assessment & Plan  3/11 sodium: 131  3/12 sodium: 138  Monitor labs    Seizure (HCC)  Assessment & Plan  Not on any ASD  Drug screen positive for cannabis only  alcohol withdrawal as etiology?  Status post contusion to the left frontal head watch for any postconcussion syndrome  monitor    Alcohol dependence (HCC)  Assessment & Plan  Watch for withdrawals  Denies heavy use.  CIWA as needed  Encourage cessation given seizure and NPH.    Normal pressure hydrocephalus (HCC)- (present on admission)  Assessment & Plan  Neurosurgery aware.  Will follow up in clinic for possible shunt.  PT/OT  Patient uses walker  CT head reviewed he has more posterior ventricle enlargement in the anterior region.  He has marked atrophy as well of his brain more so than I do suspect for his age question of this is somewhat from alcohol use.  Neurosurgery is consulting    History of hyperthyroidism- (present on admission)  Assessment & Plan  3/11 TSH <0.005  3/11 Free thyroxine:1.73    Sepsis (HCC)- (present on  admission)  Assessment & Plan  CXR underwhelming for any signs of infection and radiology does not mention any signs of pneumonia.  Minimally elevated procalcitonin on admission  Suspect WBC on 3/11 was leukemoid reaction  Patient had improved WBC 3/12  Continue 1 more day IV Unasyn  Urine analysis unremarkable blood culture showed no growth

## 2023-03-14 LAB
ALBUMIN SERPL BCP-MCNC: 3.4 G/DL (ref 3.2–4.9)
ALBUMIN/GLOB SERPL: 1 G/DL
ALP SERPL-CCNC: 94 U/L (ref 30–99)
ALT SERPL-CCNC: 20 U/L (ref 2–50)
ANION GAP SERPL CALC-SCNC: 12 MMOL/L (ref 7–16)
AST SERPL-CCNC: 22 U/L (ref 12–45)
BILIRUB SERPL-MCNC: 0.3 MG/DL (ref 0.1–1.5)
BUN SERPL-MCNC: 6 MG/DL (ref 8–22)
CALCIUM ALBUM COR SERPL-MCNC: 9.7 MG/DL (ref 8.5–10.5)
CALCIUM SERPL-MCNC: 9.2 MG/DL (ref 8.5–10.5)
CHLORIDE SERPL-SCNC: 107 MMOL/L (ref 96–112)
CO2 SERPL-SCNC: 23 MMOL/L (ref 20–33)
CREAT SERPL-MCNC: 0.52 MG/DL (ref 0.5–1.4)
CRP SERPL HS-MCNC: 6.47 MG/DL (ref 0–0.75)
ERYTHROCYTE [DISTWIDTH] IN BLOOD BY AUTOMATED COUNT: 48.3 FL (ref 35.9–50)
GFR SERPLBLD CREATININE-BSD FMLA CKD-EPI: 115 ML/MIN/1.73 M 2
GLOBULIN SER CALC-MCNC: 3.4 G/DL (ref 1.9–3.5)
GLUCOSE SERPL-MCNC: 105 MG/DL (ref 65–99)
HCT VFR BLD AUTO: 44.7 % (ref 42–52)
HGB BLD-MCNC: 15.3 G/DL (ref 14–18)
MAGNESIUM SERPL-MCNC: 2.2 MG/DL (ref 1.5–2.5)
MCH RBC QN AUTO: 33.6 PG (ref 27–33)
MCHC RBC AUTO-ENTMCNC: 34.2 G/DL (ref 33.7–35.3)
MCV RBC AUTO: 98 FL (ref 81.4–97.8)
PHOSPHATE SERPL-MCNC: 3.9 MG/DL (ref 2.5–4.5)
PLATELET # BLD AUTO: 259 K/UL (ref 164–446)
PMV BLD AUTO: 9.2 FL (ref 9–12.9)
POTASSIUM SERPL-SCNC: 4.2 MMOL/L (ref 3.6–5.5)
PREALB SERPL-MCNC: 11.7 MG/DL (ref 18–38)
PROT SERPL-MCNC: 6.8 G/DL (ref 6–8.2)
RBC # BLD AUTO: 4.56 M/UL (ref 4.7–6.1)
SODIUM SERPL-SCNC: 142 MMOL/L (ref 135–145)
WBC # BLD AUTO: 7 K/UL (ref 4.8–10.8)

## 2023-03-14 PROCEDURE — 84100 ASSAY OF PHOSPHORUS: CPT

## 2023-03-14 PROCEDURE — 770020 HCHG ROOM/CARE - TELE (206)

## 2023-03-14 PROCEDURE — 92526 ORAL FUNCTION THERAPY: CPT

## 2023-03-14 PROCEDURE — 700105 HCHG RX REV CODE 258: Performed by: INTERNAL MEDICINE

## 2023-03-14 PROCEDURE — 86140 C-REACTIVE PROTEIN: CPT

## 2023-03-14 PROCEDURE — 700105 HCHG RX REV CODE 258: Performed by: STUDENT IN AN ORGANIZED HEALTH CARE EDUCATION/TRAINING PROGRAM

## 2023-03-14 PROCEDURE — 84134 ASSAY OF PREALBUMIN: CPT

## 2023-03-14 PROCEDURE — 700102 HCHG RX REV CODE 250 W/ 637 OVERRIDE(OP): Performed by: STUDENT IN AN ORGANIZED HEALTH CARE EDUCATION/TRAINING PROGRAM

## 2023-03-14 PROCEDURE — HZ2ZZZZ DETOXIFICATION SERVICES FOR SUBSTANCE ABUSE TREATMENT: ICD-10-PCS | Performed by: INTERNAL MEDICINE

## 2023-03-14 PROCEDURE — 83735 ASSAY OF MAGNESIUM: CPT

## 2023-03-14 PROCEDURE — 700111 HCHG RX REV CODE 636 W/ 250 OVERRIDE (IP): Performed by: STUDENT IN AN ORGANIZED HEALTH CARE EDUCATION/TRAINING PROGRAM

## 2023-03-14 PROCEDURE — 80053 COMPREHEN METABOLIC PANEL: CPT

## 2023-03-14 PROCEDURE — 700105 HCHG RX REV CODE 258: Performed by: HOSPITALIST

## 2023-03-14 PROCEDURE — 700101 HCHG RX REV CODE 250: Performed by: HOSPITALIST

## 2023-03-14 PROCEDURE — 99233 SBSQ HOSP IP/OBS HIGH 50: CPT | Performed by: INTERNAL MEDICINE

## 2023-03-14 PROCEDURE — 85027 COMPLETE CBC AUTOMATED: CPT

## 2023-03-14 PROCEDURE — A9270 NON-COVERED ITEM OR SERVICE: HCPCS | Performed by: STUDENT IN AN ORGANIZED HEALTH CARE EDUCATION/TRAINING PROGRAM

## 2023-03-14 RX ORDER — SODIUM CHLORIDE, SODIUM LACTATE, POTASSIUM CHLORIDE, CALCIUM CHLORIDE 600; 310; 30; 20 MG/100ML; MG/100ML; MG/100ML; MG/100ML
INJECTION, SOLUTION INTRAVENOUS CONTINUOUS
Status: DISCONTINUED | OUTPATIENT
Start: 2023-03-14 | End: 2023-03-16 | Stop reason: HOSPADM

## 2023-03-14 RX ORDER — DEXMEDETOMIDINE HYDROCHLORIDE 4 UG/ML
.1-1.5 INJECTION, SOLUTION INTRAVENOUS CONTINUOUS
Status: DISCONTINUED | OUTPATIENT
Start: 2023-03-14 | End: 2023-03-15

## 2023-03-14 RX ADMIN — SODIUM CHLORIDE, POTASSIUM CHLORIDE, SODIUM LACTATE AND CALCIUM CHLORIDE: 600; 310; 30; 20 INJECTION, SOLUTION INTRAVENOUS at 11:18

## 2023-03-14 RX ADMIN — DEXMEDETOMIDINE 0.6 MCG/KG/HR: 200 INJECTION, SOLUTION INTRAVENOUS at 03:18

## 2023-03-14 RX ADMIN — AMPICILLIN AND SULBACTAM 3 G: 1; 2 INJECTION, POWDER, FOR SOLUTION INTRAMUSCULAR; INTRAVENOUS at 05:25

## 2023-03-14 RX ADMIN — ACETAMINOPHEN 650 MG: 325 TABLET, FILM COATED ORAL at 19:24

## 2023-03-14 RX ADMIN — SODIUM CHLORIDE, POTASSIUM CHLORIDE, SODIUM LACTATE AND CALCIUM CHLORIDE: 600; 310; 30; 20 INJECTION, SOLUTION INTRAVENOUS at 23:10

## 2023-03-14 RX ADMIN — SODIUM CHLORIDE, POTASSIUM CHLORIDE, SODIUM LACTATE AND CALCIUM CHLORIDE 1000 ML: 600; 310; 30; 20 INJECTION, SOLUTION INTRAVENOUS at 23:41

## 2023-03-14 ASSESSMENT — LIFESTYLE VARIABLES
TOTAL SCORE: 1
ORIENTATION AND CLOUDING OF SENSORIUM: CANNOT DO SERIAL ADDITIONS OR IS UNCERTAIN ABOUT DATE
VISUAL DISTURBANCES: NOT PRESENT
TREMOR: NO TREMOR
HEADACHE, FULLNESS IN HEAD: NOT PRESENT
PAROXYSMAL SWEATS: NO SWEAT VISIBLE
NAUSEA AND VOMITING: NO NAUSEA AND NO VOMITING
NAUSEA AND VOMITING: NO NAUSEA AND NO VOMITING
TREMOR: NO TREMOR
ORIENTATION AND CLOUDING OF SENSORIUM: CANNOT DO SERIAL ADDITIONS OR IS UNCERTAIN ABOUT DATE
SUBSTANCE_ABUSE: 0
TREMOR: NO TREMOR
HEADACHE, FULLNESS IN HEAD: NOT PRESENT
AUDITORY DISTURBANCES: NOT PRESENT
VISUAL DISTURBANCES: NOT PRESENT
AGITATION: SOMEWHAT MORE THAN NORMAL ACTIVITY
AUDITORY DISTURBANCES: NOT PRESENT
NAUSEA AND VOMITING: NO NAUSEA AND NO VOMITING
VISUAL DISTURBANCES: NOT PRESENT
AGITATION: SOMEWHAT MORE THAN NORMAL ACTIVITY
AGITATION: SOMEWHAT MORE THAN NORMAL ACTIVITY
TREMOR: NO TREMOR
HEADACHE, FULLNESS IN HEAD: NOT PRESENT
AGITATION: SOMEWHAT MORE THAN NORMAL ACTIVITY
HEADACHE, FULLNESS IN HEAD: NOT PRESENT
TOTAL SCORE: 3
ANXIETY: MILDLY ANXIOUS
VISUAL DISTURBANCES: NOT PRESENT
TREMOR: NO TREMOR
TOTAL SCORE: 2
ORIENTATION AND CLOUDING OF SENSORIUM: ORIENTED AND CAN DO SERIAL ADDITIONS
TOTAL SCORE: 2
PAROXYSMAL SWEATS: NO SWEAT VISIBLE
ANXIETY: NO ANXIETY (AT EASE)
ANXIETY: MILDLY ANXIOUS
AUDITORY DISTURBANCES: NOT PRESENT
VISUAL DISTURBANCES: NOT PRESENT
ANXIETY: MILDLY ANXIOUS
ORIENTATION AND CLOUDING OF SENSORIUM: ORIENTED AND CAN DO SERIAL ADDITIONS
PAROXYSMAL SWEATS: NO SWEAT VISIBLE
TOTAL SCORE: 3
AUDITORY DISTURBANCES: NOT PRESENT
PAROXYSMAL SWEATS: NO SWEAT VISIBLE
PAROXYSMAL SWEATS: NO SWEAT VISIBLE
ORIENTATION AND CLOUDING OF SENSORIUM: CANNOT DO SERIAL ADDITIONS OR IS UNCERTAIN ABOUT DATE
HEADACHE, FULLNESS IN HEAD: NOT PRESENT
NAUSEA AND VOMITING: NO NAUSEA AND NO VOMITING
AGITATION: NORMAL ACTIVITY
AUDITORY DISTURBANCES: NOT PRESENT
NAUSEA AND VOMITING: NO NAUSEA AND NO VOMITING
ANXIETY: MILDLY ANXIOUS

## 2023-03-14 ASSESSMENT — ENCOUNTER SYMPTOMS
FOCAL WEAKNESS: 0
SPUTUM PRODUCTION: 0
COUGH: 0
ORTHOPNEA: 0
CONSTIPATION: 0
MYALGIAS: 0
ABDOMINAL PAIN: 0
TREMORS: 0
DIARRHEA: 0
SENSORY CHANGE: 0
SPEECH CHANGE: 0
SHORTNESS OF BREATH: 0
WEIGHT LOSS: 0
PHOTOPHOBIA: 0
NERVOUS/ANXIOUS: 1
CHILLS: 0
HALLUCINATIONS: 0
TINGLING: 0
BACK PAIN: 0
NAUSEA: 0
FEVER: 0
PALPITATIONS: 0
NECK PAIN: 0
HEADACHES: 0
BLURRED VISION: 0
VOMITING: 0
DIZZINESS: 0
DOUBLE VISION: 0
EYE PAIN: 0

## 2023-03-14 ASSESSMENT — PAIN DESCRIPTION - PAIN TYPE
TYPE: ACUTE PAIN
TYPE: ACUTE PAIN

## 2023-03-14 ASSESSMENT — FIBROSIS 4 INDEX: FIB4 SCORE: 1.14

## 2023-03-14 NOTE — DIETARY
"Nutrition Support Assessment:  Day 3 of admit.  James Samaniego is a 62 y.o. male with admitting DX of Traumatic intracerebral hemorrhage, with unknown loss of consciousness status, initial encounter     Current problem list:  Dysphagia  Hyponatremia  Seizure  ETOH dependence  Normal pressure hydrocephalus  History of hyperthyroidism  Sepsis     Assessment:  Estimated Nutritional Needs based on:   Height: 180.3 cm (5' 10.98\")  Weight: 58.9 kg (129 lb 13.6 oz)  Weight to Use in Calculations: 60 kg (132 lb 4.4 oz)  Body mass index is 18.12 kg/m²., BMI classification: Underweight    Calculation/Equation:  MSJ * 1.2 = 1719 kcal/day  Total Calories / day: 1680 - 1740  (Calories / k - 29)  Total Grams Protein / day: 72 - 90  (Grams Protein / k.2 - 1.5)     Evaluation:   Pt failed FEES eval. Pt with altered mental state. Consult per D.O.  NG tube placed; \"Enteric feeding tube terminates in the left upper quadrant over the expected location of the stomach.\"  Per MD note on 3/14, \"Patient still spontaneous and agitated, in restraint vest\"  Labs: Glucose 105. Bun 6.  Meds: Precedex. Zofran. Thiamine.   Last BM: 3/13  Standard formula remains appropriate to meet nutritional needs.     Malnutrition Risk: Unable to assess at this time as Pt with altered mental state.     Recommendations/Plan:  Initiate Isosource 1.5 at 25 mL/hr and advance per protocol to a goal of 50 mL/hr. Other (Comment) (Isosource 1.5 Bags), goal rate 50 ml/hr, providing 1800 kcals, 81 grams protein, 916 mL free water, 211 (g of CHO).  Fluids per MD  Monitor weight    RD following.            "

## 2023-03-14 NOTE — DOCUMENTATION QUERY
[facility]                                                                       Query Response Note      PATIENT:               ORLIN SRINIVASAN  ACCT #:                  [id number]  MRN:                     [id number]  :                      1960  ADMIT DATE:       3/11/2023 2:43 AM  DISCH DATE:          RESPONDING  PROVIDER #:        [id number]           QUERY TEXT:    BMI <19 is documented in the Medical Record.  Can a diagnosis be provided to support this finding?    Thank You,  Nikky Katz RN  Clinical    Connect via Seafile or [email or web address]    The patient's Clinical Indicators include:  RD evaluation 3/14:   Body mass index is 18.12 kg/m², BMI classification: Underweight  Failed FEES evaluation    Risk Factors: Dysphagia, alcohol abuse, tobacco use, sheltered homeless    Treatment: NG tube placement, Isosource, RD evaluation, FEES, thiamine  Options provided:   -- Underweight   -- Cachexia   -- Findings of no clinical significance   -- Other explanation, (please specify other explanation)   -- Unable to determine      Query created by: Nikky Katz on 3/14/2023 10:27 AM    RESPONSE TEXT:    Underweight          Electronically signed by:  RAJENDRA PHILLIPS MD 3/14/2023 11:02 AM

## 2023-03-14 NOTE — THERAPY
Physical Therapy Contact Note    Patient Name: James Samaniego  Age:  62 y.o., Sex:  male  Medical Record #: [id number]  Today's Date: 3/14/2023       03/14/23 0752   Interdisciplinary Plan of Care Collaboration   Collaboration Comments PT consult received.  Per chart review pt with strict bed rest orders.  Will HOLD and continue to monitor to perform PT eval as able/appropriate when pt cleared for OOB activity.

## 2023-03-14 NOTE — CARE PLAN
The patient is Stable - Low risk of patient condition declining or worsening    Shift Goals  Clinical Goals: Low CIWA scores, decrease use of precedex  Patient Goals: LIN  Family Goals: LIN    Progress made toward(s) clinical / shift goals:  Pt off of precedex and free of restraints. Low CIWA scores and pt much more oriented and less agitated    Problem: Knowledge Deficit - Standard  Goal: Patient and family/care givers will demonstrate understanding of plan of care, disease process/condition, diagnostic tests and medications  Outcome: Progressing     Problem: Optimal Care for Alcohol Withdrawal  Goal: Optimal Care for the alcohol withdrawal patient  3/14/2023 1641 by Margi Bergeron R.N.  Outcome: Progressing    Problem: Seizure Precautions  Goal: Implementation of seizure precautions  3/14/2023 1641 by Margi Bergreon R.N.  Outcome: Progressing     Problem: Lifestyle Changes  Goal: Patient's ability to identify lifestyle changes and available resources to help reduce recurrence of condition will improve  3/14/2023 1641 by EULALIO Hernandez.NNeal  Outcome: Progressing     Problem: Psychosocial  Goal: Patient's level of anxiety will decrease  Outcome: Progressing  Problem: Risk for Aspiration  Goal: Patient's risk for aspiration will be absent or decrease  3/14/2023 1641 by Margi Bergeron R.N.  Outcome: Progressing     Problem: Fall Risk  Goal: Patient will remain free from falls  3/14/2023 1641 by EULALIO Hernandez.N.  Outcome: Progressing  Problem: Neuro Status  Goal: Neuro status will remain stable or improve  Outcome: Progressing     Problem: Pain - Standard  Goal: Alleviation of pain or a reduction in pain to the patient’s comfort goal  Outcome: Progressing     Problem: Safety - Medical Restraint  Goal: Remains free of injury from restraints (Restraint for Interference with Medical Device)  3/14/2023 1641 by Margi Bergeron R.N.  Outcome: Met    Goal: Free from restraint(s) (Restraint for Interference with  Medical Device)  3/14/2023 1641 by Margi Bergeron R.N.  Outcome: Met           Patient is not progressing towards the following goals:

## 2023-03-14 NOTE — DISCHARGE PLANNING
[facility] Transitional Care Coordination    Follow up for Rehab.  Current documentation reflects patient homeless, lacks discharge support for safe return to community.  No Physiatry consult ordered per protocol.  TCC will not follow.      Please reach out if PMR consult requested for medical management.      Thank you for the referral.

## 2023-03-14 NOTE — THERAPY
"Speech Language Pathology   Daily Treatment     Patient Name: James Samaniego  AGE:  62 y.o., SEX:  male  Medical Record #: [id number]  Date of Service: 3/14/2023      Precautions:  Precautions: Fall Risk, Swallow Precautions     Subjective  Pt able to be roused with assistance from RN. Poor consistency in alertness but able to participate given verbal cues. \"I don't like oatmeal.\"      Assessment  Pt seen for dysphagia management this date. Per RN, unsuccessful attempt at NG placement overnight given pt agitation. Ice chips x5 and sip of TN0 x1. Pt with broadly appropriate oral phase given limited trials. Appropriate oral bolus stripping and containment. Presumed total AP transit that was intermittently delayed with bolus hold of ice chips. Swallow appreciated for each bolus and pt was able to follow cues for repeat swallow 75% of the time which may be a meaningful strategy going forward given moderate-severe residue noted during FEES. Following single sip of TN0, pt demonstrated immediately and somewhat prolonged cough that was very weak in nature (whisper quality). This is concerning for airway invasion and poor clearance, both of which were noted on FEES.      Clinical Impressions  Pt continues to demonstrate clinical indicators of moderate-severe oropharyngeal dyspahgia which was noted on FEES. Pt is not appropriate for PO diet at this time and would benefit from use of non-oral nutrition sourse. Suspect improvements in swallow function will come with improvements in alertness, agitation, and participation. SLP following; anticipate pt will need repeat dx evaluation prior to meaningful and safe initiation of PO.     ADDENDUM:   RN reported that pt is out of restraints, participating and conversing appropriately, and told the MD that he does not want an NG placed. SLP returned in PM to discuss options with pt. Described results of FEES and presence of aspiration. Provided options for eating despite risk, repeat study " "in the morning, or placement of NG tube. Pt verbalize preference for eating despite risk and stated understanding of risks. Pt given instructions for risk mitigation strategies; swallow precautions hung in view of pt. RN and MD aware and orders to be placed for PO diet.       SLP Treatment Plan  Treatment Plan: Dysphagia Treatment, Patient/Family/Caregiver Training. Per results of SLE which is to be completed when pt's alertness and agitation is improved.   SLP Frequency: 5x Per Week  Estimated Duration: Until Therapy Goals Met      Anticipated Discharge Needs  Discharge Recommendations: Recommend post-acute placement for additional speech therapy services prior to discharge home  Therapy Recommendations Upon DC: Dysphagia Training, Patient / Family / Caregiver Education, Community Re-Integration, Other (See Comments) (Per results of SLE, pending)      Patient / Family Goals  Patient / Family Goal #1: \"it hurts to breathe sometimes.\"  Goal #1 Outcome: Progressing slower than expected  Short Term Goals  Short Term Goal # 1: Patient will be able to consume LQ3/MT2 diet with no overt S/Sx of aspiration or changes in respiratory status.  Goal Outcome # 1: Goal not met  Short Term Goal # 2: Pt will be able to consume MM5/SB6/TN0 textures with SLP only with no overt S/Sx of aspiration noted.  Goal Outcome # 2 : Goal not met  Short Term Goal # 3: Pt will consume prefeeding trials with no overt s/sx of aspiration.  Goal Outcome  # 3: Progressing slower than expected      Isabel Rabago, SLP  "

## 2023-03-14 NOTE — PROGRESS NOTES
Hospital Medicine Daily Progress Note    Date of Service  3/14/2023    Chief Complaint  Fall and seizure    Hospital Course  James Samaniego is a homeless 62 y.o. male with tobacco use, hyperthyroid, normal pressure hydrocephalus, and EtOH use who presented 3/11/2023 with a witnessed seizure x 1 minute.  He hit the left front of his head.  A CT head showed multiple small left frontal parenchymal hemorrhages. A CT cervical spine was negative for acute finding. Neurosurgery consulted and stated he has longstanding normal pressure hydrocephalus and was suppose to see neurosurgery last year but did not follow up. Neurosurgery did not feel any surgical intervention needed for the small intraparenchymal hemorrhages were needed. The patient is having some swallowing difficulty and failed a regular diet on swallow evaluation by Speech therapist but is on adjusted diet per speech. 3/13 failed swallow and concern of worsening agitation with possible EtOH withdrawal.  He failed swallow evaluation and enteral feeding started on March 13, 2023.    Interval Problem Update    03/14/23    I evaluated and examined him at the bedside.  He was agitated that he is in restraints.  He was able to tell me that he is in the hospital and Reza is a .  Plan is to discontinue restraints.  Speech therapy evaluated him and he is at risk for aspiration but he does not want NG tube for feeding.  He understand the risks of aspiration and as per patient wishes I requested a speech therapist placed diet orders.  Currently he is maintaining oxygen saturation of 95% of oxygen bradycardia overnight this morning heart rate is 52.  Blood pressure is 129/76.  Neurosurgery evaluated him and recommended outpatient follow-up.  CMP did not show any acute abnormalities.  I reviewed extra-abdominal enteral tube is in expected position.    I have discussed this patient's plan of care and discharge plan at IDT rounds today with Case  Management, Nursing, Nursing leadership, and other members of the IDT team.    Consultants/Specialty  neurosurgery    Code Status  Full Code    Disposition  Patient is not medically cleared for discharge.   Anticipate discharge to  be determined as homeless .  I have placed the appropriate orders for post-discharge needs.    Review of Systems  Review of Systems   Constitutional:  Negative for chills, fever and weight loss.   HENT:  Negative for hearing loss and tinnitus.    Eyes:  Negative for blurred vision, double vision, photophobia and pain.   Respiratory:  Negative for cough, sputum production and shortness of breath.    Cardiovascular:  Negative for chest pain, palpitations, orthopnea and leg swelling.   Gastrointestinal:  Negative for abdominal pain, constipation, diarrhea, nausea and vomiting.   Genitourinary:  Negative for dysuria, frequency and urgency.   Musculoskeletal:  Negative for back pain, joint pain, myalgias and neck pain.   Skin:  Negative for rash.   Neurological:  Negative for dizziness, tingling, tremors, sensory change, speech change, focal weakness and headaches.   Psychiatric/Behavioral:  Negative for hallucinations and substance abuse. The patient is nervous/anxious.    All other systems reviewed and are negative.     Physical Exam  Temp:  [36.2 °C (97.1 °F)-36.8 °C (98.2 °F)] 36.6 °C (97.9 °F)  Pulse:  [] 52  Resp:  [15-49] 20  BP: ()/() 129/76  SpO2:  [90 %-97 %] 95 %    Physical Exam  Vitals reviewed.   Constitutional:       General: He is not in acute distress.     Comments: Bilateral soft restraints   HENT:      Head: Normocephalic and atraumatic. No contusion.      Right Ear: External ear normal.      Left Ear: External ear normal.      Nose: Nose normal.      Mouth/Throat:      Pharynx: No oropharyngeal exudate.   Eyes:      General:         Right eye: No discharge.         Left eye: No discharge.      Pupils: Pupils are equal, round, and reactive to light.    Cardiovascular:      Rate and Rhythm: Normal rate and regular rhythm.      Heart sounds: No murmur heard.    No friction rub. No gallop.   Pulmonary:      Effort: Pulmonary effort is normal.      Breath sounds: No wheezing or rhonchi.   Abdominal:      General: Bowel sounds are normal. There is no distension.      Palpations: Abdomen is soft.      Tenderness: There is no abdominal tenderness. There is no rebound.   Musculoskeletal:         General: No swelling or tenderness. Normal range of motion.      Cervical back: No rigidity. No muscular tenderness.   Skin:     General: Skin is warm and dry.      Coloration: Skin is not jaundiced.   Neurological:      General: No focal deficit present.      Mental Status: He is alert and oriented to person, place, and time.      Cranial Nerves: No cranial nerve deficit.      Sensory: No sensory deficit.      Comments: He is following commands and able to answer my questions.   Psychiatric:         Mood and Affect: Mood normal.       Fluids    Intake/Output Summary (Last 24 hours) at 3/14/2023 0842  Last data filed at 3/14/2023 0750  Gross per 24 hour   Intake 455.49 ml   Output 850 ml   Net -394.51 ml         Laboratory  Recent Labs     03/12/23  0600 03/13/23  0323 03/14/23  0419   WBC 10.6 11.1* 7.0   RBC 3.87* 4.55* 4.56*   HEMOGLOBIN 13.1* 15.3 15.3   HEMATOCRIT 39.8* 44.2 44.7   .8* 97.1 98.0*   MCH 33.9* 33.6* 33.6*   MCHC 32.9* 34.6 34.2   RDW 51.9* 47.3 48.3   PLATELETCT 230 244 259   MPV 9.6 9.0 9.2       Recent Labs     03/12/23  0600 03/13/23  0323 03/14/23  0419   SODIUM 138 140 142   POTASSIUM 4.2 3.9 4.2   CHLORIDE 108 105 107   CO2 21 23 23   GLUCOSE 86 102* 105*   BUN 8 5* 6*   CREATININE 0.45* 0.44* 0.52   CALCIUM 8.2* 9.2 9.2                       Imaging  DX-ABDOMEN FOR TUBE PLACEMENT   Final Result      Enteric feeding tube terminates in the left upper quadrant over the expected location of the stomach.      DX-CHEST-LIMITED (1 VIEW)   Final Result          1.  No focal infiltrates.      [id number] VIEW   Final Result      There is no evidence of implanted electronic device in the abdomen or pelvis                  CT-HEAD W/O   Final Result      1.  No significant interval change in small left frontal parenchymal hemorrhages measuring up to 5 mm.      2.  Persistent prominence of the ventricles could be related to central atrophy versus normal pressure hydrocephalus.         CT-CSPINE WITH PLUS RECONS   Final Result         1.  Multilevel degenerative changes of the cervical spine limit diagnostic sensitivity of this examination, otherwise no acute traumatic bony injury of the cervical spine is apparent.   2.  Atherosclerosis      CT-HEAD W/O   Final Result         1.  Multiple small left frontal parenchymal hemorrhages measuring up to 4.6 mm.   2.  Moderate bilateral ventricular dilatation, may represent ex vacuo changes, consider component of normal pressure hydrocephalus as clinically appropriate. Appears overall stable since prior study.   3.  Nonspecific white matter changes, commonly associated with small vessel ischemic disease.  Associated mild cerebral atrophy is noted.   4.  Mild bilateral and frontal and ethmoid sinusitis changes   5.  Atherosclerosis.      Based solely on CT findings, the brain injury guideline category is mBIG 3.      EDH   IVH   Displaced skull fx   SDH > 8mm   IPH > 8mm or multiple   SAH bi-hemispheric or > 3mm      The original BIG retrospective analysis found radiographic progression in 0% of BIG 1 patients and 2.6% BIG 2.      These findings were discussed with the patient's clinician, Arjun Ramos, on 3/11/2023 5:21 AM.      DX-CHEST-PORTABLE (1 VIEW)   Final Result         1.  No acute cardiopulmonary disease.   2.  Atherosclerosis      MR-BRAIN-W/O    (Results Pending)          Assessment/Plan  * Traumatic intracerebral hemorrhage, with unknown loss of consciousness status, initial encounter- (present on  admission)  Assessment & Plan  Trauma surgery signed off  Neurosurgery not recommending surgical intervention  Avoid NSAIDS, anticoagulation  Fall precautions.  Walker  PT/OT  neurochecks q 4 hrs and transfer to neuro floor  I discussed plan of care with bedside RN.    Dysphagia  Assessment & Plan  Speech therapy evaluated patient 3/12 patient has patient on modified diet  Aspiration precautions  Failed repeat swallow. NGT placed and enteral feeding started on March 13, 2023    Hyponatremia  Assessment & Plan  3/11 sodium: 131  3/12 sodium: 138  3/13 Na:140  Now resolved.    Seizure (HCC)  Assessment & Plan  Not on any ASD  Drug screen positive for cannabis only  alcohol withdrawal as etiology?  Status post contusion to the left frontal head watch for any postconcussion syndrome  monitor    Alcohol dependence (HCC)  Assessment & Plan  Worsening confusion 3/13 per RN and now in restraints  Watch for withdrawals  Denies heavy use.  CIWA as needed  Encourage cessation given seizure and NPH.  Thiamine/folate/MVI  Continue to monitor.    Normal pressure hydrocephalus (HCC)- (present on admission)  Assessment & Plan  Neurosurgery aware.  Will follow up in clinic for possible shunt.  PT/OT  Patient uses walker  CT head reviewed he has more posterior ventricle enlargement in the anterior region.  He has marked atrophy as well of his brain more so than I do suspect for his age question of this is somewhat from alcohol use.    Neurosurgery recommended outpatient follow-up for possible  shunt placement.    History of hyperthyroidism- (present on admission)  Assessment & Plan  3/11 TSH <0.005  3/11 Free thyroxine:1.73    Sepsis (HCC)- (present on admission)  Assessment & Plan  CXR underwhelming for any signs of infection and radiology does not mention any signs of pneumonia.  Minimally elevated procalcitonin on admission  Suspect WBC on 3/11 was leukemoid reaction  Patient had improved WBC 3/12  Discontinued antibiotic on  March 14, 2023 as he is afebrile and white blood cell count is within normal limits.  Urine analysis unremarkable blood culture showed no growth      I discussed plan of care during multidisciplinary rounds regarding patient's current medical condition and plan of care.    I discussed plan of care with the speech therapist.    VTE prophylaxis: SCDs/TEDs    I have performed a physical exam and reviewed and updated ROS and Plan today (3/14/2023). In review of yesterday's note (3/13/2023), there are no changes except as documented above.

## 2023-03-14 NOTE — PROGRESS NOTES
Hospital Medicine Daily Progress Note    Date of Service  3/13/2023    Chief Complaint  Fall and seizure    Hospital Course  James Samaniego is a homeless 62 y.o. male with tobacco use, hyperthyroid, normal pressure hydrocephalus, and EtOH use who presented 3/11/2023 with a witnessed seizure x 1 minute.  He hit the left front of his head.  A CT head showed multiple small left frontal parenchymal hemorrhages. A CT cervical spine was negative for acute finding. Neurosurgery consulted and stated he has longstanding normal pressure hydrocephalus and was suppose to see neurosurgery last year but did not follow up. Neurosurgery did not feel any surgical intervention needed for the small intraparenchymal hemorrhages were needed. The patient is having some swallowing difficulty and failed a regular diet on swallow evaluation by Speech therapist but is on adjusted diet per speech. 3/13 failed swallow and concern of worsening agitation with possible EtOH withdrawal.    Interval Problem Update  3/13: failed swallow eval.  Enteral feeding to be started. Nursing report increased agitation. Transfer changed to [facility] for closer monitoring.  Confused to place. Lethargic/sedated.    I have discussed this patient's plan of care and discharge plan at IDT rounds today with Case Management, Nursing, Nursing leadership, and other members of the IDT team.    Consultants/Specialty  neurosurgery    Code Status  Full Code    Disposition  Patient is not medically cleared for discharge.   Anticipate discharge to  be determined as homeless .  I have placed the appropriate orders for post-discharge needs.    Review of Systems  Review of Systems   Unable to perform ROS: Mental acuity      Physical Exam  Temp:  [36.1 °C (97 °F)-36.8 °C (98.2 °F)] 36.6 °C (97.8 °F)  Pulse:  [] 129  Resp:  [21-48] 30  BP: ()/() 129/78  SpO2:  [90 %-98 %] 90 %    Physical Exam  Constitutional:       Appearance: He is ill-appearing.      Interventions:  He is sedated and restrained.   HENT:      Head: Normocephalic and atraumatic.      Mouth/Throat:      Mouth: Mucous membranes are moist.   Eyes:      General:         Right eye: No discharge.         Left eye: No discharge.      Extraocular Movements: Extraocular movements intact.   Cardiovascular:      Rate and Rhythm: Normal rate.      Pulses:           Radial pulses are 2+ on the right side and 2+ on the left side.      Heart sounds: No murmur heard.  Pulmonary:      Effort: No respiratory distress.      Breath sounds: Normal breath sounds. No wheezing.   Abdominal:      General: Bowel sounds are normal. There is no distension.      Palpations: Abdomen is soft.      Tenderness: There is no abdominal tenderness.   Musculoskeletal:      Cervical back: Neck supple.      Right lower leg: No edema.      Left lower leg: No edema.   Skin:     General: Skin is dry.   Neurological:      Mental Status: He is lethargic and confused.      Cranial Nerves: No cranial nerve deficit.   Psychiatric:         Speech: Speech is delayed.         Behavior: Behavior is slowed.         Cognition and Memory: Cognition is impaired.       Fluids    Intake/Output Summary (Last 24 hours) at 3/13/2023 1857  Last data filed at 3/13/2023 1739  Gross per 24 hour   Intake 1251.34 ml   Output 2625 ml   Net -1373.66 ml       Laboratory  Recent Labs     03/11/23  0420 03/12/23  0600 03/13/23  0323   WBC 20.0* 10.6 11.1*   RBC 4.62* 3.87* 4.55*   HEMOGLOBIN 15.5 13.1* 15.3   HEMATOCRIT 45.0 39.8* 44.2   MCV 97.4 102.8* 97.1   MCH 33.5* 33.9* 33.6*   MCHC 34.4 32.9* 34.6   RDW 47.8 51.9* 47.3   PLATELETCT 307 230 244   MPV 9.1 9.6 9.0     Recent Labs     03/11/23  0420 03/11/23  0951 03/12/23  0020 03/12/23  0600 03/13/23  0323   SODIUM 131*   < > 138 138 140   POTASSIUM 3.8  --   --  4.2 3.9   CHLORIDE 98  --   --  108 105   CO2 22  --   --  21 23   GLUCOSE 101*  --   --  86 102*   BUN 10  --   --  8 5*   CREATININE 0.55  --   --  0.45* 0.44*    CALCIUM 9.0  --   --  8.2* 9.2    < > = values in this interval not displayed.     Recent Labs     03/11/23  0420   INR 1.01               Imaging  DX-ABDOMEN FOR TUBE PLACEMENT   Final Result      Enteric feeding tube terminates in the left upper quadrant over the expected location of the stomach.      DX-CHEST-LIMITED (1 VIEW)   Final Result         1.  No focal infiltrates.      [id number] VIEW   Final Result      There is no evidence of implanted electronic device in the abdomen or pelvis                  CT-HEAD W/O   Final Result      1.  No significant interval change in small left frontal parenchymal hemorrhages measuring up to 5 mm.      2.  Persistent prominence of the ventricles could be related to central atrophy versus normal pressure hydrocephalus.         CT-CSPINE WITH PLUS RECONS   Final Result         1.  Multilevel degenerative changes of the cervical spine limit diagnostic sensitivity of this examination, otherwise no acute traumatic bony injury of the cervical spine is apparent.   2.  Atherosclerosis      CT-HEAD W/O   Final Result         1.  Multiple small left frontal parenchymal hemorrhages measuring up to 4.6 mm.   2.  Moderate bilateral ventricular dilatation, may represent ex vacuo changes, consider component of normal pressure hydrocephalus as clinically appropriate. Appears overall stable since prior study.   3.  Nonspecific white matter changes, commonly associated with small vessel ischemic disease.  Associated mild cerebral atrophy is noted.   4.  Mild bilateral and frontal and ethmoid sinusitis changes   5.  Atherosclerosis.      Based solely on CT findings, the brain injury guideline category is mBIG 3.      EDH   IVH   Displaced skull fx   SDH > 8mm   IPH > 8mm or multiple   SAH bi-hemispheric or > 3mm      The original BIG retrospective analysis found radiographic progression in 0% of BIG 1 patients and 2.6% BIG 2.      These findings were discussed with the patient's  clinician, Arjun Ramos, on 3/11/2023 5:21 AM.      DX-CHEST-PORTABLE (1 VIEW)   Final Result         1.  No acute cardiopulmonary disease.   2.  Atherosclerosis      MR-BRAIN-W/O    (Results Pending)        Assessment/Plan  * Traumatic intracerebral hemorrhage, with unknown loss of consciousness status, initial encounter- (present on admission)  Assessment & Plan  Trauma surgery signed off  Neurosurgery not recommending surgical intervention  Avoid NSAIDS, anticoagulation  Fall precautions.  Walker  PT/OT  neurochecks q 4 hrs and transfer to neuro floor    Dysphagia  Assessment & Plan  Speech therapy evaluated patient 3/12 patient has patient on modified diet  Aspiration precautions  Failed repeat swallow. NGT placed and enteral feeding started    Hyponatremia  Assessment & Plan  3/11 sodium: 131  3/12 sodium: 138  3/13 Na:140  Monitor labs    Seizure (HCC)  Assessment & Plan  Not on any ASD  Drug screen positive for cannabis only  alcohol withdrawal as etiology?  Status post contusion to the left frontal head watch for any postconcussion syndrome  monitor    Alcohol dependence (HCC)  Assessment & Plan  Worsening confusion 3/13 per RN and now in restraints  Watch for withdrawals  Denies heavy use.  CIWA as needed  Encourage cessation given seizure and NPH.  Thiamine/folate/MVI    Normal pressure hydrocephalus (HCC)- (present on admission)  Assessment & Plan  Neurosurgery aware.  Will follow up in clinic for possible shunt.  PT/OT  Patient uses walker  CT head reviewed he has more posterior ventricle enlargement in the anterior region.  He has marked atrophy as well of his brain more so than I do suspect for his age question of this is somewhat from alcohol use.  Neurosurgery is consulting    History of hyperthyroidism- (present on admission)  Assessment & Plan  3/11 TSH <0.005  3/11 Free thyroxine:1.73    Sepsis (HCC)- (present on admission)  Assessment & Plan  CXR underwhelming for any signs of infection and  radiology does not mention any signs of pneumonia.  Minimally elevated procalcitonin on admission  Suspect WBC on 3/11 was leukemoid reaction  Patient had improved WBC 3/12  Continue 1 more day IV Unasyn  Urine analysis unremarkable blood culture showed no growth         VTE prophylaxis: SCDs/TEDs    I have performed a physical exam and reviewed and updated ROS and Plan today (3/13/2023). In review of yesterday's note (3/12/2023), there are no changes except as documented above.

## 2023-03-14 NOTE — CARE PLAN
The patient is Watcher - Medium risk of patient condition declining or worsening    Shift Goals  Clinical Goals: Decrease DEx and low CIWA score  Patient Goals: LIN  Family Goals: LIN    Progress made toward(s) clinical / shift goals:    Problem: Knowledge Deficit - Standard  Goal: Patient and family/care givers will demonstrate understanding of plan of care, disease process/condition, diagnostic tests and medications  Outcome: Progressing     Problem: Seizure Precautions  Goal: Implementation of seizure precautions  Outcome: Progressing     Problem: Risk for Aspiration  Goal: Patient's risk for aspiration will be absent or decrease  Outcome: Progressing     Problem: Hemodynamics  Goal: Patient's hemodynamics, fluid balance and neurologic status will be stable or improve  Outcome: Progressing     Problem: Fluid Volume  Goal: Fluid volume balance will be maintained  Outcome: Progressing       Patient is not progressing towards the following goals:

## 2023-03-14 NOTE — CARE PLAN
The patient is Watcher - Medium risk of patient condition declining or worsening    Shift Goals  Clinical Goals: Low CIWA scores  Patient Goals:   Family Goals:   Progress made toward(s) clinical / shift goals:  Pt scoring high on CIWA and needing additional meds to stay calm    Patient is not progressing towards the following goals:  Problem: Optimal Care for Alcohol Withdrawal  Goal: Optimal Care for the alcohol withdrawal patient  Outcome: Progressing     Problem: Seizure Precautions  Goal: Implementation of seizure precautions  Outcome: Progressing     Problem: Lifestyle Changes  Goal: Patient's ability to identify lifestyle changes and available resources to help reduce recurrence of condition will improve  Outcome: Progressing     Problem: Risk for Aspiration  Goal: Patient's risk for aspiration will be absent or decrease  Outcome: Progressing     Problem: Fall Risk  Goal: Patient will remain free from falls  Outcome: Progressing     Problem: Safety - Medical Restraint  Goal: Remains free of injury from restraints (Restraint for Interference with Medical Device)  Outcome: Progressing  Goal: Free from restraint(s) (Restraint for Interference with Medical Device)  Outcome: Progressing

## 2023-03-15 PROBLEM — G91.0 COMMUNICATING HYDROCEPHALUS (HCC): Status: ACTIVE | Noted: 2023-03-15

## 2023-03-15 PROBLEM — F12.90 MARIJUANA USE: Status: ACTIVE | Noted: 2023-03-15

## 2023-03-15 LAB
ALBUMIN SERPL BCP-MCNC: 3.4 G/DL (ref 3.2–4.9)
ALBUMIN/GLOB SERPL: 1 G/DL
ALP SERPL-CCNC: 96 U/L (ref 30–99)
ALT SERPL-CCNC: 19 U/L (ref 2–50)
ANION GAP SERPL CALC-SCNC: 12 MMOL/L (ref 7–16)
AST SERPL-CCNC: 22 U/L (ref 12–45)
BILIRUB SERPL-MCNC: 0.3 MG/DL (ref 0.1–1.5)
BUN SERPL-MCNC: 7 MG/DL (ref 8–22)
CALCIUM ALBUM COR SERPL-MCNC: 9.5 MG/DL (ref 8.5–10.5)
CALCIUM SERPL-MCNC: 9 MG/DL (ref 8.5–10.5)
CHLORIDE SERPL-SCNC: 104 MMOL/L (ref 96–112)
CO2 SERPL-SCNC: 21 MMOL/L (ref 20–33)
CREAT SERPL-MCNC: 0.43 MG/DL (ref 0.5–1.4)
EKG IMPRESSION: NORMAL
ERYTHROCYTE [DISTWIDTH] IN BLOOD BY AUTOMATED COUNT: 47.2 FL (ref 35.9–50)
GFR SERPLBLD CREATININE-BSD FMLA CKD-EPI: 122 ML/MIN/1.73 M 2
GLOBULIN SER CALC-MCNC: 3.4 G/DL (ref 1.9–3.5)
GLUCOSE SERPL-MCNC: 92 MG/DL (ref 65–99)
HCT VFR BLD AUTO: 44.4 % (ref 42–52)
HGB BLD-MCNC: 15.2 G/DL (ref 14–18)
MAGNESIUM SERPL-MCNC: 2 MG/DL (ref 1.5–2.5)
MCH RBC QN AUTO: 33.6 PG (ref 27–33)
MCHC RBC AUTO-ENTMCNC: 34.2 G/DL (ref 33.7–35.3)
MCV RBC AUTO: 98 FL (ref 81.4–97.8)
PHOSPHATE SERPL-MCNC: 3 MG/DL (ref 2.5–4.5)
PLATELET # BLD AUTO: 261 K/UL (ref 164–446)
PMV BLD AUTO: 9.3 FL (ref 9–12.9)
POTASSIUM SERPL-SCNC: 4 MMOL/L (ref 3.6–5.5)
PROT SERPL-MCNC: 6.8 G/DL (ref 6–8.2)
RBC # BLD AUTO: 4.53 M/UL (ref 4.7–6.1)
SODIUM SERPL-SCNC: 137 MMOL/L (ref 135–145)
WBC # BLD AUTO: 8 K/UL (ref 4.8–10.8)

## 2023-03-15 PROCEDURE — 97602 WOUND(S) CARE NON-SELECTIVE: CPT

## 2023-03-15 PROCEDURE — 97535 SELF CARE MNGMENT TRAINING: CPT

## 2023-03-15 PROCEDURE — 99232 SBSQ HOSP IP/OBS MODERATE 35: CPT | Performed by: STUDENT IN AN ORGANIZED HEALTH CARE EDUCATION/TRAINING PROGRAM

## 2023-03-15 PROCEDURE — 770020 HCHG ROOM/CARE - TELE (206)

## 2023-03-15 PROCEDURE — 83735 ASSAY OF MAGNESIUM: CPT

## 2023-03-15 PROCEDURE — 700102 HCHG RX REV CODE 250 W/ 637 OVERRIDE(OP): Performed by: INTERNAL MEDICINE

## 2023-03-15 PROCEDURE — A9270 NON-COVERED ITEM OR SERVICE: HCPCS | Performed by: STUDENT IN AN ORGANIZED HEALTH CARE EDUCATION/TRAINING PROGRAM

## 2023-03-15 PROCEDURE — 93005 ELECTROCARDIOGRAM TRACING: CPT

## 2023-03-15 PROCEDURE — 85027 COMPLETE CBC AUTOMATED: CPT

## 2023-03-15 PROCEDURE — 700105 HCHG RX REV CODE 258: Performed by: INTERNAL MEDICINE

## 2023-03-15 PROCEDURE — 93010 ELECTROCARDIOGRAM REPORT: CPT | Performed by: INTERNAL MEDICINE

## 2023-03-15 PROCEDURE — 97166 OT EVAL MOD COMPLEX 45 MIN: CPT

## 2023-03-15 PROCEDURE — 84100 ASSAY OF PHOSPHORUS: CPT

## 2023-03-15 PROCEDURE — 80053 COMPREHEN METABOLIC PANEL: CPT

## 2023-03-15 PROCEDURE — A9270 NON-COVERED ITEM OR SERVICE: HCPCS | Performed by: INTERNAL MEDICINE

## 2023-03-15 PROCEDURE — 92526 ORAL FUNCTION THERAPY: CPT

## 2023-03-15 PROCEDURE — 700102 HCHG RX REV CODE 250 W/ 637 OVERRIDE(OP): Performed by: STUDENT IN AN ORGANIZED HEALTH CARE EDUCATION/TRAINING PROGRAM

## 2023-03-15 PROCEDURE — 51798 US URINE CAPACITY MEASURE: CPT

## 2023-03-15 PROCEDURE — 97162 PT EVAL MOD COMPLEX 30 MIN: CPT

## 2023-03-15 RX ORDER — LOSARTAN POTASSIUM 50 MG/1
50 TABLET ORAL
Status: DISCONTINUED | OUTPATIENT
Start: 2023-03-15 | End: 2023-03-16 | Stop reason: HOSPADM

## 2023-03-15 RX ORDER — GABAPENTIN 100 MG/1
200 CAPSULE ORAL 3 TIMES DAILY
Status: DISCONTINUED | OUTPATIENT
Start: 2023-03-15 | End: 2023-03-16 | Stop reason: HOSPADM

## 2023-03-15 RX ORDER — CLONIDINE HYDROCHLORIDE 0.1 MG/1
0.1 TABLET ORAL TWICE DAILY
Status: DISCONTINUED | OUTPATIENT
Start: 2023-03-15 | End: 2023-03-15

## 2023-03-15 RX ADMIN — ACETAMINOPHEN 650 MG: 325 TABLET, FILM COATED ORAL at 20:28

## 2023-03-15 RX ADMIN — ACETAMINOPHEN 650 MG: 325 TABLET, FILM COATED ORAL at 00:18

## 2023-03-15 RX ADMIN — SODIUM CHLORIDE, POTASSIUM CHLORIDE, SODIUM LACTATE AND CALCIUM CHLORIDE: 600; 310; 30; 20 INJECTION, SOLUTION INTRAVENOUS at 23:53

## 2023-03-15 RX ADMIN — GABAPENTIN 200 MG: 100 CAPSULE ORAL at 08:26

## 2023-03-15 RX ADMIN — CLONIDINE HYDROCHLORIDE 0.1 MG: 0.1 TABLET ORAL at 16:34

## 2023-03-15 RX ADMIN — Medication 100 MG: at 04:49

## 2023-03-15 RX ADMIN — GABAPENTIN 200 MG: 100 CAPSULE ORAL at 16:34

## 2023-03-15 RX ADMIN — LOSARTAN POTASSIUM 50 MG: 50 TABLET, FILM COATED ORAL at 18:22

## 2023-03-15 RX ADMIN — CLONIDINE HYDROCHLORIDE 0.1 MG: 0.1 TABLET ORAL at 08:26

## 2023-03-15 ASSESSMENT — ENCOUNTER SYMPTOMS
SHORTNESS OF BREATH: 0
NAUSEA: 0
FALLS: 1
WEAKNESS: 1
DIZZINESS: 0
ABDOMINAL PAIN: 0
CHILLS: 0
MYALGIAS: 0
COUGH: 0
FEVER: 0
NERVOUS/ANXIOUS: 1
HEADACHES: 0
VOMITING: 0
INSOMNIA: 0
PALPITATIONS: 0

## 2023-03-15 ASSESSMENT — COGNITIVE AND FUNCTIONAL STATUS - GENERAL
TURNING FROM BACK TO SIDE WHILE IN FLAT BAD: A LITTLE
WALKING IN HOSPITAL ROOM: A LITTLE
SUGGESTED CMS G CODE MODIFIER MOBILITY: CK
DAILY ACTIVITIY SCORE: 17
TOILETING: A LITTLE
DRESSING REGULAR UPPER BODY CLOTHING: A LITTLE
CLIMB 3 TO 5 STEPS WITH RAILING: TOTAL
SUGGESTED CMS G CODE MODIFIER DAILY ACTIVITY: CK
MOVING FROM LYING ON BACK TO SITTING ON SIDE OF FLAT BED: A LITTLE
DRESSING REGULAR LOWER BODY CLOTHING: A LOT
HELP NEEDED FOR BATHING: A LOT
PERSONAL GROOMING: A LITTLE
MOVING TO AND FROM BED TO CHAIR: A LITTLE
MOBILITY SCORE: 16
STANDING UP FROM CHAIR USING ARMS: A LITTLE

## 2023-03-15 ASSESSMENT — ACTIVITIES OF DAILY LIVING (ADL): TOILETING: INDEPENDENT

## 2023-03-15 ASSESSMENT — PAIN DESCRIPTION - PAIN TYPE
TYPE: ACUTE PAIN

## 2023-03-15 ASSESSMENT — GAIT ASSESSMENTS
DISTANCE (FEET): 100
ASSISTIVE DEVICE: 4 WHEEL WALKER
GAIT LEVEL OF ASSIST: MINIMAL ASSIST

## 2023-03-15 NOTE — PROGRESS NOTES
2236: Received report from [facility] RNGold[facility].     2335: Pt transported up to Neurosciences with CNA and RN transport. All patient belongings in place. VSS. Pt complains of headache pain.     2357: Pt placed on tele. Monitor called for sinus rhythm with slight ST elevation. Pt denies chest pain. VSS. On-call hospitalist, Sang Jiménez notified. Stat EKG ordered.

## 2023-03-15 NOTE — THERAPY
Occupational Therapy Contact Note:     03/15/23 8889   Interdisciplinary Plan of Care Collaboration   Collaboration Comments Strict bedrest orders remain in place. Will hold OT eval until orders have been discontinued.         Sherron Dunbar, OTR/L

## 2023-03-15 NOTE — PROGRESS NOTES
Neurosurgery Progress Note    Subjective:  Patient seen earlier this morning with this late entry. He was easy to awaken with verbal stim, denied h/a, seemed less agitated than yesterday. In restraints.      Exam:  Alert, oriented to self, year and that he's in hospital.  Perrl  Follows commands and has no drift.         BP  Min: 90/58  Max: 140/88  Pulse  Av.1  Min: 47  Max: 89  Resp  Av.8  Min: 15  Max: 49  Temp  Av.4 °C (97.5 °F)  Min: 36.2 °C (97.1 °F)  Max: 36.6 °C (97.9 °F)  SpO2  Av.9 %  Min: 90 %  Max: 95 %    No data recorded    Recent Labs     23  0623   WBC 10.6 11.1* 7.0   RBC 3.87* 4.55* 4.56*   HEMOGLOBIN 13.1* 15.3 15.3   HEMATOCRIT 39.8* 44.2 44.7   .8* 97.1 98.0*   MCH 33.9* 33.6* 33.6*   MCHC 32.9* 34.6 34.2   RDW 51.9* 47.3 48.3   PLATELETCT 230 244 259   MPV 9.6 9.0 9.2       Recent Labs     23   SODIUM 138 140 142   POTASSIUM 4.2 3.9 4.2   CHLORIDE 108 105 107   CO2    GLUCOSE 86 102* 105*   BUN 8 5* 6*   CREATININE 0.45* 0.44* 0.52   CALCIUM 8.2* 9.2 9.2                     Intake/Output                         23 - 2359 23 - 03/15/23 0659      Total [id number] Total                 Intake    I.V.  49.5  -- 49.5  169.9  -- 169.9    Magnesium Sulfate Volume 47.1 -- 47.1 -- -- --    Precedex Volume 2.3 -- 2.3 169.9 -- 169.9    IV Piggyback  241.9  -- 241.9  --  -- --    Volume (mL) (potassium phosphate 15 mmol in dextrose 5% 250 mL ivpb) 241.9 -- 241.9 -- -- --    Total Intake 291.3 -- 291.3 169.9 -- 169.9       Output    Urine  950  -- 950  125  -- 125    Number of Times Incontinent of Urine 1 x -- 1 x -- -- --    Urine Void (mL) 200 -- 200 -- -- --    Output (mL) ([REMOVED] External Urinary Catheter (Condom) 23 0751) 750 -- 750 -- -- --    Output (mL) (Urethral Catheter) -- -- -- 125 -- 125    Stool  --  -- --  --  --  --    Number of Times Stooled 0 x -- 0 x -- -- --    Total Output 950 -- 950 125 -- 125       Net I/O     -658.7 -- -658.7 44.9 -- 44.9              Intake/Output Summary (Last 24 hours) at 3/14/2023 1806  Last data filed at 3/14/2023 1200  Gross per 24 hour   Intake 169.94 ml   Output 125 ml   Net 44.94 ml               dexmedetomidine (Precedex) infusion  0.1-1.5 mcg/kg/hr Continuous    LR   Continuous    haloperidol lactate  5 mg Q4HRS PRN    LORazepam  0.5 mg Q4HRS PRN    LORazepam  1 mg Q4HRS PRN    Or    LORazepam  0.5 mg Q4HRS PRN    LORazepam  2 mg Q2HRS PRN    Or    LORazepam  1 mg Q2HRS PRN    LORazepam  3 mg Q HOUR PRN    Or    LORazepam  1.5 mg Q HOUR PRN    LORazepam  4 mg Q15 MIN PRN    Or    LORazepam  2 mg Q15 MIN PRN    acetaminophen  650 mg Q4HRS PRN    Or    acetaminophen  650 mg Q4HRS PRN    LORazepam  4 mg Q10 MIN PRN    Respiratory Therapy Consult   Continuous RT    ondansetron  4 mg Q4HRS PRN    Or    ondansetron  4 mg Q4HRS PRN    MD Alert...Adult ICU Electrolyte Replacement per Pharmacy   PHARMACY TO DOSE    thiamine  100 mg DAILY    hydrALAZINE  20 mg Q6HRS PRN    labetalol  10-20 mg Q6HRS PRN       Assessment and Plan:  Hospital day # 4   POD #na  Prophylactic anticoagulation: no         Start date/time: 3/14    Patient with frequent falls, etoh abuse. Had witnessed seizure where he hit his head. Has small frontal contusions, stable on follow up CT  He has large ventricles and probably has a component of hydrocephalus.   We have considered placing a shunt in the past but ability to follow up with this gentleman in quite limited.  He can go to q 4 neuro checks and can go to the floor once stable.   Outpt follow up to discuss VPS once he is normalized  No Nsx plans in current state

## 2023-03-15 NOTE — DISCHARGE PLANNING
Received Choice Form @: 6839  Agency/ Facility Name: [facility]  Referral Sent per Choice Form @: 0190

## 2023-03-15 NOTE — PROGRESS NOTES
Monitor summary: SR 72-80, MD -0.14, QRS -0.07, QT -0.40, with rare PVCs and rare bigeminal PVCs per strip from the monitor room.

## 2023-03-15 NOTE — ASSESSMENT & PLAN NOTE
Concerning for possibility of normal pressure hydrocephalus.    Outpatient follow-up with neurosurgery.  I will also discussed with patient about the possibility of high-volume lumbar puncture if he has significant clinical symptoms consistent with NPH.

## 2023-03-15 NOTE — WOUND TEAM
[facility]  Inpatient Services  Initial Wound and Skin Care Evaluation    Admission Date: 3/11/2023     Last order of IP CONSULT TO WOUND CARE was found on 3/15/2023 from Hospital Encounter on 3/11/2023     HPI, PMH, SH: Reviewed    History reviewed. No pertinent surgical history.  Social History     Tobacco Use    Smoking status: Every Day    Smokeless tobacco: Current   Substance Use Topics    Alcohol use: Yes     Chief Complaint   Patient presents with    Seizure     Pt had about 1 minute witnessed seizure. Pt did hit L front of head.      Diagnosis: Traumatic intracerebral hemorrhage, with unknown loss of consciousness status, initial encounter [S06.36AA]    Unit where seen by Wound Team: S177/02     WOUND CONSULT/FOLLOW UP RELATED TO:   L. Elbow and L forehead     WOUND HISTORY:  Patient had a seizure and fell hitting his forehead.  Per Provider: The entire history is obtained from healthcare providers and the medical record as this gentleman cannot provide me with any history.  This is a 62-year-old gentleman with history of alcohol abuse, cannabis abuse, hyperthyroidism and normal pressure hydrocephalus.  This gentleman was brought in by EMS to the ED this morning after he had a 1 minute witnessed seizure.  He whacked the left front of his head.  A CT of the head reveals multiple small left frontal parenchymal hemorrhages.  A CT of the cervical spine does not reveal evidence of fracture or subluxation.  He was appropriately evaluated by the trauma service, but they would like the medical intensivist team to admit and care for him.  It is my pleasure to do so.  Dr. Erwin Swain is kindly consulting from neurosurgery.    WOUND ASSESSMENT/LDA  Wound 03/14/23 Abrasion Face Upper Left Healing Scab (Active)   Wound Image   03/14/23 8344   Site Assessment Black;Dry;Clean;Red 03/15/23 0800   Periwound Assessment Clean;Dry;Intact 03/15/23 0800   Margins Defined edges 03/15/23 0800   Closure Open to air  03/14/23 2335   Dressing Changed Observed 03/14/23 2335   Dressing Status Open to Air 03/15/23 1200   Number of days: 1       Wound 03/14/23 Partial Thickness Wound Elbow Left (Active)   Wound Image   03/15/23 1200   Site Assessment Yellow 03/15/23 1200   Periwound Assessment Maria Antonia 03/15/23 1200   Margins Defined edges;Unattached edges 03/15/23 1200   Closure Adhesive bandage 03/15/23 1200   Drainage Amount Scant 03/15/23 1200   Drainage Description Serous 03/15/23 1200   Treatments Cleansed;Site care 03/15/23 1200   Wound Cleansing Approved Wound Cleanser 03/15/23 1200   Periwound Protectant Skin Protectant Wipes to Periwound 03/15/23 1200   Dressing Cleansing/Solutions Not Applicable 03/15/23 1200   Dressing Options Hydrofiber Silver;Silicone Adhesive Foam 03/15/23 1200   Dressing Changed New 03/15/23 1200   Dressing Status Clean;Dry;Intact 03/15/23 1200   Dressing Change/Treatment Frequency Every 72 hrs, and As Needed 03/15/23 1200   NEXT Dressing Change/Treatment Date 03/18/23 03/15/23 1200   NEXT Weekly Photo (Inpatient Only) 03/22/23 03/15/23 1200   Non-staged Wound Description Partial thickness 03/15/23 1200   Shape [place] 03/15/23 1200   Wound Odor None 03/15/23 1200   Pulses N/A 03/15/23 1200   Exposed Structures LIN 03/15/23 1200   WOUND NURSE ONLY - Time Spent with Patient (mins) 60 03/15/23 1200   Number of days: 1        Vascular:    BORIS:   No results found.    Lab Values:    Lab Results   Component Value Date/Time    WBC 8.0 03/15/2023 03:42 AM    RBC 4.53 (L) 03/15/2023 03:42 AM    HEMOGLOBIN 15.2 03/15/2023 03:42 AM    HEMATOCRIT 44.4 03/15/2023 03:42 AM    CREACTPROT 6.47 (H) 03/14/2023 04:19 AM        Culture Results show:  No results found for this or any previous visit (from the past 720 hour(s)).    Pain Level/Medicated:  patient tolerated without pain medication    INTERVENTIONS BY WOUND TEAM:  Chart and images reviewed. Discussed with bedside RN. All areas of concern (based on picture review,  LDA review and discussion with bedside RN) have been thoroughly assessed. Documentation of areas based on significant findings. This RN in to assess patient. Performed standard wound care which includes appropriate positioning, dressing removal and non-selective debridement. Pictures and measurements obtained weekly if/when required.  Preparation for Dressing removal: Dressing soaked with n/a  Non-selectively Debrided with:  wound cleanser and gauze.  Sharp debridement: n/a  Danny wound: Cleansed with wound cleanser, Prepped with no sting  Primary Dressing: Aquacel Ag applied over open wound  Secondary (Outer) Dressing: silicone adhesive foam    Advanced Wound Care Discharge Planning  Number of Clinicians necessary to complete wound care: 1  Is patient requiring IV pain medications for dressing changes: no  Length of time for dressing change 10 min. (This does not include chart review, pre-medication time, set up, clean up or time spent charting.)    Interdisciplinary consultation: Patient, Bedside RN ,     EVALUATION / RATIONALE FOR TREATMENT:  Most Recent Date:  3/15/23: Partial thickness wound to L. Elbow Aquacel Ag Hydrofiber applied to manage bioburden, absorb exudate, and maintain a moist wound environment without laterally wicking exudate therefore reducing danny-wound maceration     Goals: Steady decrease in wound area and depth weekly.    WOUND TEAM PLAN OF CARE ([X] for frequency of wound follow up,): x  Nursing to follow dressing orders written for wound care. Contact wound team if area fails to progress, deteriorates or with any questions/concerns if something comes up before next scheduled follow up (See below as to whether wound is following and frequency of wound follow up)  Dressing changes by wound team:                   Follow up 3 times weekly:                NPWT change 3 times weekly:     Follow up 1-2 times weekly:      Follow up Bi-Monthly:           Follow up Monthly (High Risk):                         Follow up as needed:     Other (explain): Wound team is not following    NURSING PLAN OF CARE ORDERS (X):  Dressing changes: See Dressing Care orders: x  Skin care: See Skin Care orders: x  RN Prevention Protocol: x  Rectal tube care: See Rectal Tube Care orders:   Other orders:    RSKIN:   CURRENTLY IN PLACE (X), APPLIED THIS VISIT (A), ORDERED (O):   Q shift Robel:  X  Q shift pressure point assessments:  X    Surface/Positioning x  Standard Mattress/Trauma Bed     x     Low Airloss          ICU Low Airloss   Bariatric JANET     Waffle cushion        Waffle Overlay          Reposition q 2 hours      TAPs Turning system     Z [place] Pillow     Offloading/Redistribution x  Sacral Offloading Dressing (Silicone dressing)     Heel Offloading Dressing (Silicone dressing)       x  Heel float boots (Prevalon boot)             Float Heels off Bed with Pillows           Respiratory room air  Silicone O2 tubing         Gray Foam Ear protectors     Cannula fixation Device (Tender )          High flow offloading Clip    Elastic head band offloading device      Anchorfast                                                         Trach with Optifoam split foam             Containment/Moisture Prevention incontinent     Rectal tube or BMS    Purwick/Condom Cath        Wing Catheter    Barrier wipes           Barrier paste       Antifungal tx      Interdry        Mobilization up self      Up to chair        Ambulate      PT/OT      Nutrition po      Dietician        Diabetes Education      PO     TF     TPN     NPO   # days     Other        Anticipated discharge plans: snf no advance wound care needed  LTACH:        SNF/Rehab:                  Home Health Care:           Outpatient Wound Center:            Self/Family Care:        Other:                  Vac Discharge Needs:   Vac Discharge plan is purely a recommendation from wound team and not a requirement for discharge unless otherwise stated by physician.  Not  Applicable Pt not on a wound vac:     x  Regular Vac while inpatient, alternative dressing at DC:        Regular Vac in use and continued at DC:            Reg. Vac w/ Skin Sub/Biologic in use. Will need to be changed 2x wkly:      Veraflo Vac while inpatient, ok to transition to Regular Vac on Discharge (Bedside RN to Clamp small instillation tubing at time of DC):           Veraflo Vac while inpatient, would benefit from remaining on Veraflo Vac upon discharge:

## 2023-03-15 NOTE — THERAPY
"Speech Language Pathology   Daily Treatment     Patient Name: James Samaniego  AGE:  62 y.o., SEX:  male  Medical Record #: [id number]  Date of Service: 3/15/2023      Precautions:   High Aspiration risk       Subjective  \"I am hungry.\"      Assessment  Pt sitting up in chair for bfast meal. Cues for small amounts per bolus, slow rate, and no talking with oral bolus. Pt is impulsive and irritable. Pt hitting his fork on the table following cues by SLP. Three episodes of non-productive coughing with pt cued to repeat swallowsfollowing coughing episodes. No to slight lingual residue post swallow. Mild muffled quality of voice that improves with cued repeat swallow or cough-swallow.       Clinical Impressions  Pt demonstrating s/s of dysphagia and swallow difficulty with MM5/TNO with preference for current oral intake despite risks. Nurs and friend/caregiver advised to superv pt for all intake.         SLP Treatment Plan  Treatment Plan: (P) Dysphagia Treatment  SLP Frequency: (P) 5x Per Week  Estimated Duration: (P) Until Therapy Goals Met      Anticipated Discharge Needs   Dysphagia tx at next level of care.          Patient / Family Goals  Patient / Family Goal #1: (P) \"it hurts to breathe sometimes.\"  Goal #1 Outcome: (P) Progressing slower than expected  Short Term Goals  Short Term Goal # 2: (P) Pt will be able to consume MM5/SB6/TN0 textures with SLP only with no overt S/Sx of aspiration noted.  Goal Outcome # 2 : (P) Progressing slower than expected      Galina Engel, SLP  "

## 2023-03-15 NOTE — PROGRESS NOTES
Neurosurgery Progress Note    Subjective:  No acute events over night  About to ambulate      Exam:  Alert and oriented  MARTINEZ no focal deficits  Still on/off confusion        BP  Min: 106/68  Max: 156/88  Pulse  Av.5  Min: 56  Max: 89  Resp  Av.5  Min: 18  Max: 34  Temp  Av.6 °C (97.9 °F)  Min: 36.2 °C (97.1 °F)  Max: 37.1 °C (98.8 °F)  SpO2  Av.2 %  Min: 91 %  Max: 95 %    No data recorded    Recent Labs     233 03/14/23  0419 03/15/23  0342   WBC 11.1* 7.0 8.0   RBC 4.55* 4.56* 4.53*   HEMOGLOBIN 15.3 15.3 15.2   HEMATOCRIT 44.2 44.7 44.4   MCV 97.1 98.0* 98.0*   MCH 33.6* 33.6* 33.6*   MCHC 34.6 34.2 34.2   RDW 47.3 48.3 47.2   PLATELETCT 244 259 261   MPV 9.0 9.2 9.3       Recent Labs     03/13/23  0323 03/14/23  0419 03/15/23  0342   SODIUM 140 142 137   POTASSIUM 3.9 4.2 4.0   CHLORIDE 105 107 104   CO2 23 23 21   GLUCOSE 102* 105* 92   BUN 5* 6* 7*   CREATININE 0.44* 0.52 0.43*   CALCIUM 9.2 9.2 9.0                     Intake/Output                         23 - 03/15/23 0659 03/15/23 07 - 23 0659     0659 Total  Total                 Intake    P.O.  --  10 10  --  -- --    P.O. -- 10 10 -- -- --    I.V.  169.9  1000 1169.9  --  -- --    Precedex Volume 169.9 -- 169.9 -- -- --    Volume (mL) (lactated ringers infusion) -- 1000 1000 -- -- --    Total Intake 169.9 1010 1179.9 -- -- --       Output    Urine  345  950 1295  --  -- --    Output (mL) ([REMOVED] Urethral Catheter 03/15/23 0727)  -- -- --    Stool  --  -- --  --  -- --    Number of Times Stooled 1 x 2 x 3 x -- -- --    Total Output  -- -- --       Net I/O     -175.1 60 -115.1 -- -- --              Intake/Output Summary (Last 24 hours) at 3/15/2023 0904  Last data filed at 3/15/2023 0317  Gross per 24 hour   Intake 1015.79 ml   Output 1295 ml   Net -279.21 ml               cloNIDine  0.1 mg TWICE DAILY    gabapentin  200 mg TID    LR   Continuous     haloperidol lactate  5 mg Q4HRS PRN    LORazepam  0.5 mg Q4HRS PRN    LORazepam  1 mg Q4HRS PRN    Or    LORazepam  0.5 mg Q4HRS PRN    LORazepam  2 mg Q2HRS PRN    Or    LORazepam  1 mg Q2HRS PRN    LORazepam  3 mg Q HOUR PRN    Or    LORazepam  1.5 mg Q HOUR PRN    LORazepam  4 mg Q15 MIN PRN    Or    LORazepam  2 mg Q15 MIN PRN    acetaminophen  650 mg Q4HRS PRN    Or    acetaminophen  650 mg Q4HRS PRN    LORazepam  4 mg Q10 MIN PRN    Respiratory Therapy Consult   Continuous RT    ondansetron  4 mg Q4HRS PRN    Or    ondansetron  4 mg Q4HRS PRN    thiamine  100 mg DAILY    hydrALAZINE  20 mg Q6HRS PRN    labetalol  10-20 mg Q6HRS PRN       Assessment and Plan:  Hospital day # 5  POD #na  Prophylactic anticoagulation: no         Start date/time: 3/14    Patient with frequent falls, etoh abuse. Had witnessed seizure where he hit his head. Has small frontal contusions, stable on follow up CT  He has large ventricles and probably has a component of hydrocephalus.   We have considered placing a shunt in the past but ability to follow up with this gentleman in quite limited.  Continue neuro checks  Outpt follow up to discuss VPS once he is normalized  No Nsx plans in current state

## 2023-03-15 NOTE — CARE PLAN
The patient is Stable - Low risk of patient condition declining or worsening    Shift Goals  Clinical Goals: Neuro status; Safety; No CIWA/Agitaiton  Patient Goals: Rest  Family Goals: LIN    Progress made toward(s) clinical / shift goals:  Assumed care of pt at 2230. POC discussed with pt. Pt A/Ox 3 with disorientation to time (year). Pt verbalized understanding. Pt on fall/seizure/aspiration precautions. Pt on Q4hr neuro checks and Q2hr turns. Pt educated to use the call light for assistance. Call light within reach. Pt rounded on frequently throughout the shift.      Problem: Seizure Precautions  Goal: Implementation of seizure precautions  Outcome: Progressing  Note: Seizure precautions are in place. Bed locked in lowest position, all four side rails are padded, three side rails up at any time, suction in place, and oxygen in place. Pt educated on seizure precautions. Pt educated to call for assistance. Pt verbalized understanding. Call light within reach.       Problem: Skin Integrity  Goal: Skin integrity is maintained or improved  Outcome: Progressing  Note:  Pt turned and repositioned Q2H or as requested. Barrier cream and mepilexes used to prevent skin breakdown on delicate perineal areas and bony prominences. Linen changes provided as needed to avoid risk of developing pressure ulcers.      Problem: Pain - Standard  Goal: Alleviation of pain or a reduction in pain to the patient’s comfort goal  Outcome: Progressing  Note: Pt's pain assessed throughout shift. Patient offered pharmacological and non-pharmacological interventions.      Problem: Neuro Status  Goal: Neuro status will remain stable or improve  Outcome: Progressing  Note: Pt's neuro status assessed throughout the shift with frequent neuro checks. Pt has maintained a stable neuro status.         Patient is not progressing towards the following goals:

## 2023-03-15 NOTE — DOCUMENTATION QUERY
"                                                                         [facility]                                                                       Query Response Note      PATIENT:               ORLIN SRINIVASAN  ACCT #:                  [id number]  MRN:                     [id number]  :                      1960  ADMIT DATE:       3/11/2023 2:43 AM  DISCH DATE:          RESPONDING  PROVIDER #:        [id number]           QUERY TEXT:    Documentation in the medical record indicates that this patient was admitted for Sepsis, ETOH withdrawal, seizure, and acute ICH. Per progress notes, \"CXR underwhelming for any signs of infection ... Suspect WBC on 3/11 was leukemoid reaction\". LABS: WBC 20.0. VS: Tmax 101.6 x1, -137, RR 19-36. Treatment: IV Unasyn discontinued on 3/14.     Please clarify the status of sepsis after further study based on the above clinical indicators.    Thank You,  Nikky Katz RN  Clinical    Connect via [employer] or [email or web address]    The patient's Clinical Indicators include:  Per H&P, this is a 62-year-old male admitted on 3/11 with suspected sepsis, acute ICH, seizure, and ETOH withdrawal.    SIRS: WBC 20.0, Tmax 101.6 x1, -137, RR 19-36  SOFA Score: 0  Lactic acid 1.1, procalcitonin 0.64    3/12 - 3/14 PNs: CXR underwhelming for infection, suspect WBC was leukemoid reaction, UA unremarkable, blood culture showed no growth.    Risk Factors: ETOH withdrawal, dysphagia, ICH  Treatment: D/C abx on 3/14, labs, imaging, treat underlying ETOH withdrawal and ICH  Options provided:   -- Sepsis ruled out after further study   -- Sepsis confirmed, (please specify source of infection)   -- SIRS related to Non-infectious cause   -- Other explanation, (please specify other explanation)   -- Unable to determine      Query created by: Nikky Katz on 3/15/2023 12:58 PM    RESPONSE TEXT:    Sepsis ruled out after further study          Electronically " signed by:  RAJENDRA PHILLIPS MD 3/15/2023 1:13 PM

## 2023-03-15 NOTE — CARE PLAN
The patient is Watcher - Medium risk of patient condition declining or worsening    Shift Goals  Clinical Goals: Neuro status; Safety; No CIWA/Agitaiton  Patient Goals: Rest  Family Goals: LIN    Progress made toward(s) clinical / shift goals:    Problem: Safety - Medical Restraint  Goal: Remains free of injury from restraints (Restraint for Interference with Medical Device)  Outcome: Progressing  Goal: Free from restraint(s) (Restraint for Interference with Medical Device)  Outcome: Progressing       Patient is not progressing towards the following goals:

## 2023-03-15 NOTE — THERAPY
Physical Therapy   Initial Evaluation     Patient Name: James Samaniego  Age:  62 y.o., Sex:  male  Medical Record #: [id number]  Today's Date: 3/15/2023     Precautions  Precautions: Fall Risk;Swallow Precautions    Assessment  Patient is 62 y.o. male with a diagnosis of Traumatic ICH after fall. PMH includes alcohol abuse, cannabis abuse, hyperthyroidism and normal pressure hydrocephalus    Pt tolerated session well. He requires CGA for transfers and MIN A for ambulation with a 4WW. Pt demonstrates significant gait deviations as stated below. He reports that his deviations are his baseline. Pt presents with impaired activity tolerance, impaired ambulation, impaired transfer status, impaired LE strength, and impaired balance. He will benefit from acute PT services to improve his functional independence, improve his cardiovascular endurance and balance. Pt is homeless and presents with a complex discharge. Anticipate discharge to post acute placement pending medical and physical progress.     Plan    Physical Therapy Initial Treatment Plan   Treatment Plan : Bed Mobility, Debridement, Equipment, Group Therapy, Neuro Re-Education / Balance, Manual Therapy, Orthotics Training , Self Care / Home Evaluation, Stair Training, Therapeutic Activities, Therapeutic Exercise  Treatment Frequency: 3 Times per Week  Duration: Until Therapy Goals Met    DC Equipment Recommendations: Unable to determine at this time  Discharge Recommendations: Recommend post-acute placement for additional physical therapy services prior to discharge home       Subjective    Pt is pleasant and cooperative, increased processing time required.        Objective       03/15/23 0982   Initial Contact Note    Initial Contact Note Order Received and Verified, Physical Therapy Evaluation in Progress with Full Report to Follow.   Precautions   Precautions Fall Risk;Swallow Precautions   Vitals   Pulse 84   Pulse Oximetry 96 %   O2 (LPM) 0   O2 Delivery Device  None - Room Air   Prior Living Situation   Housing / Facility Homeless   Comments Pt reports living at shelter   Prior Level of Functional Mobility   Bed Mobility Independent   Transfer Status Independent   Ambulation Independent   Ambulation Distance unable to determine   Stairs Independent   Passive ROM Lower Body   Comments Limited BLE   Active ROM Lower Body    Comments Limited BLE   Strength Lower Body   Lower Body Strength  X   Rt Hip Flexion Strength 3+ (F+)   Rt Knee Extension Strength 3+ (F+)   Rt Ankle Dorsiflexion Strength 3+ (F+)   Lt Hip Flexion Strength 3+ (F+)   Lt Knee Extension Strength 3+ (F+)   Lt Ankle Dorsiflexion Strength 3+ (F+)   Balance Assessment   Sitting Balance (Static) Fair +   Sitting Balance (Dynamic) Fair   Standing Balance (Static) Fair -   Standing Balance (Dynamic) Poor +   Weight Shift Sitting Fair   Weight Shift Standing Poor   Bed Mobility    Supine to Sit Standby Assist   Gait Analysis   Gait Level Of Assist Minimal Assist   Assistive Device 4 Wheel Walker   Distance (Feet) 100   # of Times Distance was Traveled 1   Deviation Ataxic;Step To;Increased Base Of Support;Bradykinetic;Shuffled Gait   Comments Pt ambulated 100' with 4WW and MIN A. Pt demonstrates significantly decreased ambulation speed, wide base of support, bilateral ataxia, bilateral circumduction. He requires MIN A for controlled weight shifting and especially increased assistance during turning.   Functional Mobility   Sit to Stand Contact Guard Assist   Bed, Chair, Wheelchair Transfer Contact Guard Assist   How much difficulty does the patient currently have...   Turning over in bed (including adjusting bedclothes, sheets and blankets)? 3   Sitting down on and standing up from a chair with arms (e.g., wheelchair, bedside commode, etc.) 3   Moving from lying on back to sitting on the side of the bed? 3   How much help from another person does the patient currently need...   Moving to and from a bed to a chair  (including a wheelchair)? 3   Need to walk in a hospital room? 3   Climbing 3-5 steps with a railing? 1   6 clicks Mobility Score 16   Short Term Goals    Short Term Goal # 1 Pt will perform sit<>supine with supervision within 6 visits.   Short Term Goal # 2 Pt will ambulate 150' with 4WW and supervision within 6 visits.   Short Term Goal # 3 Pt will perform sit<>stand transfers with 4WW and supervision.   Education Group   Education Provided Role of Physical Therapist   Role of Physical Therapist Patient Response Patient;Acceptance;Eager;Explanation;Demonstration;Reinforcement Needed;Verbal Demonstration;Action Demonstration   Physical Therapy Initial Treatment Plan    Treatment Plan  Bed Mobility;Debridement;Equipment;Group Therapy;Neuro Re-Education / Balance;Manual Therapy;Orthotics Training ;Self Care / Home Evaluation;Stair Training;Therapeutic Activities;Therapeutic Exercise   Treatment Frequency 3 Times per Week   Duration Until Therapy Goals Met   Problem List    Problems Impaired Bed Mobility;Impaired Transfers;Impaired Ambulation;Functional ROM Deficit;Functional Strength Deficit;Impaired Balance;Impaired Coordination;Decreased Activity Tolerance;Safety Awareness Deficits / Cognition   Anticipated Discharge Equipment and Recommendations   DC Equipment Recommendations Unable to determine at this time   Discharge Recommendations Recommend post-acute placement for additional physical therapy services prior to discharge home   Interdisciplinary Plan of Care Collaboration   IDT Collaboration with  Occupational Therapist;Nursing   Patient Position at End of Therapy Seated;Chair Alarm On;Tray Table within Reach;Call Light within Reach;Phone within Reach  (Lap belt)   Session Information   Date / Session Number  3/15- 1 (1/3, 3/21)

## 2023-03-15 NOTE — PROGRESS NOTES
Hospital Medicine Daily Progress Note    Date of Service  3/15/2023    Chief Complaint  Fall and seizure    Hospital Course  James Samaniego is a homeless 62 y.o. male with tobacco use, hyperthyroid, normal pressure hydrocephalus, and EtOH use who presented 3/11/2023 with a witnessed seizure x 1 minute.  He hit the left front of his head.  A CT head showed multiple small left frontal parenchymal hemorrhages. A CT cervical spine was negative for acute finding. Neurosurgery consulted and stated he has longstanding normal pressure hydrocephalus and was suppose to see neurosurgery last year but did not follow up. Neurosurgery did not feel any surgical intervention needed for the small intraparenchymal hemorrhages were needed. The patient is having some swallowing difficulty and failed a regular diet on swallow evaluation by Speech therapist but is on adjusted diet per speech. 3/13 failed swallow and concern of worsening agitation with possible EtOH withdrawal.  He failed swallow evaluation and enteral feeding started on March 13, 2023.    Interval Problem Update  Night concerns about hallucinations, restlessness, and tremor concerning for alcohol withdrawal.  He was placed on CIWA protocol.  Received Haldol.  Ammonia level 23.  This morning, patient is alert and having x3.  Blood pressures increasing.  I started the patient on clonidine but given patient's low CIWA scores, I have switched the patient's clonidine to losartan 50 mg daily.        I personally reviewed the patient's repeat head CT, which shows a small punctate left frontal intraparenchymal hemorrhage.  Signs of normal pressure hydrocephalus.    Neurosurgery recommending outpatient follow-up.  PT and OT recommending postacute placement.  Skilled nursing facility referrals have been sent.  He states that he would like to go to a skilled nursing facility rather than back to his shelter.    I have discussed this patient's plan of care and discharge plan at [facility]  rounds today with Case Management, Nursing, Nursing leadership, and other members of the IDT team.    Consultants/Specialty  neurosurgery    Code Status  Full Code    Disposition  Patient is medically cleared for discharge.   Anticipate discharge to to skilled nursing facility.  I have placed the appropriate orders for post-discharge needs.    Review of Systems  Review of Systems   Constitutional:  Negative for chills and fever.   Respiratory:  Negative for cough and shortness of breath.    Cardiovascular:  Negative for chest pain and palpitations.   Gastrointestinal:  Negative for abdominal pain, nausea and vomiting.   Genitourinary:  Negative for dysuria and hematuria.   Musculoskeletal:  Positive for falls. Negative for joint pain and myalgias.   Neurological:  Positive for weakness. Negative for dizziness and headaches.   Psychiatric/Behavioral:  The patient is nervous/anxious. The patient does not have insomnia.       Physical Exam  Temp:  [36.4 °C (97.5 °F)-37.1 °C (98.8 °F)] 36.4 °C (97.5 °F)  Pulse:  [] 78  Resp:  [18-34] 18  BP: ()/(67-91) 145/87  SpO2:  [91 %-96 %] 93 %    Physical Exam  Vitals reviewed.   Constitutional:       General: He is not in acute distress.     Comments: Bilateral soft restraints   HENT:      Head: Normocephalic and atraumatic. No contusion.      Right Ear: External ear normal.      Left Ear: External ear normal.      Nose: Nose normal.      Mouth/Throat:      Pharynx: No oropharyngeal exudate.   Eyes:      General:         Right eye: No discharge.         Left eye: No discharge.      Pupils: Pupils are equal, round, and reactive to light.   Cardiovascular:      Rate and Rhythm: Normal rate and regular rhythm.      Heart sounds: No murmur heard.    No friction rub. No gallop.   Pulmonary:      Effort: Pulmonary effort is normal.      Breath sounds: No wheezing or rhonchi.   Abdominal:      General: Bowel sounds are normal. There is no distension.      Palpations:  Abdomen is soft.      Tenderness: There is no abdominal tenderness. There is no rebound.   Musculoskeletal:         General: No swelling or tenderness. Normal range of motion.      Cervical back: No rigidity. No muscular tenderness.   Skin:     General: Skin is warm and dry.      Coloration: Skin is not jaundiced.   Neurological:      General: No focal deficit present.      Mental Status: He is alert and oriented to person, place, and time.      Cranial Nerves: No cranial nerve deficit.      Sensory: No sensory deficit.      Comments: He is following commands and able to answer my questions.   Psychiatric:         Mood and Affect: Mood normal.       Fluids    Intake/Output Summary (Last 24 hours) at 3/15/2023 1643  Last data filed at 3/15/2023 1058  Gross per 24 hour   Intake 1110 ml   Output 2070 ml   Net -960 ml         Laboratory  Recent Labs     03/13/23 0323 03/14/23  0419 03/15/23  0342   WBC 11.1* 7.0 8.0   RBC 4.55* 4.56* 4.53*   HEMOGLOBIN 15.3 15.3 15.2   HEMATOCRIT 44.2 44.7 44.4   MCV 97.1 98.0* 98.0*   MCH 33.6* 33.6* 33.6*   MCHC 34.6 34.2 34.2   RDW 47.3 48.3 47.2   PLATELETCT 244 259 261   MPV 9.0 9.2 9.3       Recent Labs     03/13/23 0323 03/14/23  0419 03/15/23  0342   SODIUM 140 142 137   POTASSIUM 3.9 4.2 4.0   CHLORIDE 105 107 104   CO2 23 23 21   GLUCOSE 102* 105* 92   BUN 5* 6* 7*   CREATININE 0.44* 0.52 0.43*   CALCIUM 9.2 9.2 9.0                       Imaging  DX-ABDOMEN FOR TUBE PLACEMENT   Final Result      Enteric feeding tube terminates in the left upper quadrant over the expected location of the stomach.      DX-CHEST-LIMITED (1 VIEW)   Final Result         1.  No focal infiltrates.      MD-[id number] VIEW   Final Result      There is no evidence of implanted electronic device in the abdomen or pelvis                  CT-HEAD W/O   Final Result      1.  No significant interval change in small left frontal parenchymal hemorrhages measuring up to 5 mm.      2.  Persistent prominence of  the ventricles could be related to central atrophy versus normal pressure hydrocephalus.         CT-CSPINE WITH PLUS RECONS   Final Result         1.  Multilevel degenerative changes of the cervical spine limit diagnostic sensitivity of this examination, otherwise no acute traumatic bony injury of the cervical spine is apparent.   2.  Atherosclerosis      CT-HEAD W/O   Final Result         1.  Multiple small left frontal parenchymal hemorrhages measuring up to 4.6 mm.   2.  Moderate bilateral ventricular dilatation, may represent ex vacuo changes, consider component of normal pressure hydrocephalus as clinically appropriate. Appears overall stable since prior study.   3.  Nonspecific white matter changes, commonly associated with small vessel ischemic disease.  Associated mild cerebral atrophy is noted.   4.  Mild bilateral and frontal and ethmoid sinusitis changes   5.  Atherosclerosis.      Based solely on CT findings, the brain injury guideline category is mBIG 3.      EDH   IVH   Displaced skull fx   SDH > 8mm   IPH > 8mm or multiple   SAH bi-hemispheric or > 3mm      The original BIG retrospective analysis found radiographic progression in 0% of BIG 1 patients and 2.6% BIG 2.      These findings were discussed with the patient's clinician, Arjun Ramos, on 3/11/2023 5:21 AM.      DX-CHEST-PORTABLE (1 VIEW)   Final Result         1.  No acute cardiopulmonary disease.   2.  Atherosclerosis      MR-BRAIN-W/O    (Results Pending)          Assessment/Plan  * Traumatic intracerebral hemorrhage, with unknown loss of consciousness status, initial encounter- (present on admission)  Assessment & Plan  Repeat head CT 3/15/2023 showing stable left intraparenchymal hemorrhage.    Neurosurgery not recommending surgical intervention  Continue fall precautions  Neurochecks every 4 hours  Outpatient follow-up with neurosurgery    Patient is medically cleared for discharge to a skilled nursing facility    Communicating  hydrocephalus (HCC)- (present on admission)  Assessment & Plan    Concerning for possibility of normal pressure hydrocephalus.    Outpatient follow-up with neurosurgery.  I will also discussed with patient about the possibility of high-volume lumbar puncture if he has significant clinical symptoms consistent with NPH.    Marijuana use- (present on admission)  Assessment & Plan  Urine drug screen positive for cannabinoids.  Patient reporting use of marijuana.    Dysphagia  Assessment & Plan  Failed repeat swallow. NGT placed and enteral feeding started on March 13, 2023    Speech therapy has advance patient's diet to minced.  Tolerating well without signs of aspiration.  Continue aspiration precautions    Hyponatremia  Assessment & Plan  3/11 sodium: 131  3/12 sodium: 138  3/13 Na:140  Now resolved.    Seizure (HCC)  Assessment & Plan  Not on any ASD  Drug screen positive for cannabis only  alcohol withdrawal as etiology?  Status post contusion to the left frontal head watch for any postconcussion syndrome  monitor    Alcohol dependence (HCC)  Assessment & Plan  As per history.  Low CIWA scores.      I have discontinued patient's Ativan.    Normal pressure hydrocephalus (HCC)- (present on admission)  Assessment & Plan  Neurosurgery aware.  Will follow up in clinic for possible shunt.  PT/OT  Patient uses walker  CT head reviewed he has more posterior ventricle enlargement in the anterior region.  He has marked atrophy as well of his brain more so than I do suspect for his age question of this is somewhat from alcohol use.    Neurosurgery recommended outpatient follow-up for possible  shunt placement.    History of hyperthyroidism- (present on admission)  Assessment & Plan  3/11 TSH <0.005  3/11 Free thyroxine:1.73    Sepsis (HCC)- (present on admission)  Assessment & Plan  CXR underwhelming for any signs of infection and radiology does not mention any signs of pneumonia.  Minimally elevated procalcitonin on  admission  Suspect WBC on 3/11 was leukemoid reaction  Patient had improved WBC 3/12  Discontinued antibiotic on March 14, 2023 as he is afebrile and white blood cell count is within normal limits.  Urine analysis unremarkable blood culture showed no growth      I discussed plan of care during multidisciplinary rounds regarding patient's current medical condition and plan of care.    I discussed plan of care with the speech therapist.    VTE prophylaxis: SCDs/TEDs    I have performed a physical exam and reviewed and updated ROS and Plan today (3/15/2023). In review of yesterday's note (3/14/2023), there are no changes except as documented above.

## 2023-03-15 NOTE — PROGRESS NOTES
4 Eyes Skin Assessment Completed by HARSHAL Alcantara and HARSHAL Cuevas.    Head Scab- large scab over L forehead from fall   Ears WDL  Nose WDL  Mouth WDL  Neck WDL  Breast/Chest WDL  Shoulder Blades WDL  Spine WDL  (R) Arm/Elbow/Hand Bruising and Scab  (L) Arm/Elbow/Hand Bruising and Scab, L elbow open wound with drainage, covered with silicone dressing  Abdomen WDL  Groin WDL- cornejo   Scrotum/Coccyx/Buttocks Redness, Blanching, and Scab  (R) Leg Redness, Blanching, Scab, and Bruising  (L) Leg Redness, Blanching, Scab, and Bruising  (R) Heel/Foot/Toe Scab  (L) Heel/Foot/Toe Scab          Devices In Places Tele Box, Blood Pressure Cuff, Pulse Ox, Cornejo, and SCD's      Interventions In Place Gray Ear Foams, NC W/Ear Foams, Heel Mepilex, TAP System, Pillows, Elbow Mepilex, Barrier Cream, Heels Loaded W/Pillows, and Pressure Redistribution Mattress    Possible Skin Injury No    Pictures Uploaded Into Epic Yes  Wound Consult Placed N/A  RN Wound Prevention Protocol Ordered Yes

## 2023-03-15 NOTE — CARE PLAN
Problem: Knowledge Deficit - Standard  Goal: Patient and family/care givers will demonstrate understanding of plan of care, disease process/condition, diagnostic tests and medications  Outcome: Progressing     Problem: Optimal Care for Alcohol Withdrawal  Goal: Optimal Care for the alcohol withdrawal patient  Outcome: Progressing     Problem: Seizure Precautions  Goal: Implementation of seizure precautions  Outcome: Progressing     Problem: Lifestyle Changes  Goal: Patient's ability to identify lifestyle changes and available resources to help reduce recurrence of condition will improve  Outcome: Progressing     Problem: Psychosocial  Goal: Patient's level of anxiety will decrease  Outcome: Progressing  Goal: Spiritual and cultural needs incorporated into hospitalization  Outcome: Progressing     Problem: Risk for Aspiration  Goal: Patient's risk for aspiration will be absent or decrease  Outcome: Progressing     Problem: Hemodynamics  Goal: Patient's hemodynamics, fluid balance and neurologic status will be stable or improve  Outcome: Progressing     Problem: Fluid Volume  Goal: Fluid volume balance will be maintained  Outcome: Progressing     Problem: Urinary - Renal Perfusion  Goal: Ability to achieve and maintain adequate renal perfusion and functioning will improve  Outcome: Progressing     Problem: Respiratory  Goal: Patient will achieve/maintain optimum respiratory ventilation and gas exchange  Outcome: Progressing     Problem: Physical Regulation  Goal: Diagnostic test results will improve  Outcome: Progressing  Goal: Signs and symptoms of infection will decrease  Outcome: Progressing     Problem: Skin Integrity  Goal: Skin integrity is maintained or improved  Outcome: Progressing     Problem: Fall Risk  Goal: Patient will remain free from falls  Outcome: Progressing     Problem: Pain - Standard  Goal: Alleviation of pain or a reduction in pain to the patient’s comfort goal  Outcome: Progressing      Problem: Lumbar/Thoracic Spine Surgery  Goal: Post-Operative Lumbar/Thoracic Spine Surgery: Patient will achieve optimal post-surgical outcomes  Outcome: Progressing     Problem: Cervical Spine Surgery  Goal: Post-Operative Cervical Spine Surgery: Patient will achieve optimal post-surgical outcomes  Outcome: Progressing     Problem: Neuro Status  Goal: Neuro status will remain stable or improve  Outcome: Progressing     Problem: Knowledge Deficit - Neuro Surgical  Goal: Patient's understanding of spinal precautions, appropriate body mechanics and incision care will improve  Outcome: Progressing     Problem: Respiratory/Oxygenation Function Post-Surgical  Goal: Patient will achieve/maintain normal respiratory rate/effort  Outcome: Progressing     Problem: Early Mobilization - Post Surgery  Goal: Early mobilization post surgery  Outcome: Progressing     Problem: Neurovascular Monitoring  Goal: Patient's neurovascular status will be maintained or improve  Outcome: Progressing     Problem: Bowel Elimination - Post Surgical  Goal: Patient will resume regular bowel sounds and function with no discomfort or distention  Outcome: Progressing     Problem: Pain - Post Surgery  Goal: Alleviation or reduction of pain post surgery  Outcome: Progressing  Goal: Proper management of On-Q Pump  Outcome: Progressing     Problem: Incision Care  Goal: Optimal post surgical incision care  Outcome: Progressing     Problem: Risk for Post Op Fluid Imbalance  Goal: Promotion of fluid balance  Outcome: Progressing     Problem: Surgical Drain Management  Goal: Proper management/care of surgical drains will be maintained  Outcome: Progressing     Problem: Lumbar Drain Management  Goal: Proper management/care of lumbar drain will be maintained  Outcome: Progressing     Problem: Psychosocial  Goal: Patient's level of anxiety will decrease  Outcome: Progressing  Goal: Spiritual and cultural needs incorporated into hospitalization  Outcome:  Progressing  Goal: Patient's ability to verbalize feelings about condition will improve  Outcome: Progressing  Goal: Patient's ability to re-evaluate and adapt role responsibilities will improve  Outcome: Progressing  Goal: Patient and family will demonstrate ability to cope with life altering diagnosis and/or procedure  Outcome: Progressing     Problem: Venous Thromboembolism (VTE) Prevention  Goal: The patient will remain free from venous thromboembolism (VTE)  Outcome: Progressing     Problem: Nutrition  Goal: Patient's nutritional and fluid intake will be adequate or improve  Outcome: Progressing  Goal: Enteral nutrition will be maintained or improve  Outcome: Progressing  Goal: Enteral nutrition will be maintained or improve  Outcome: Progressing   The patient is Stable - Low risk of patient condition declining or worsening    Shift Goals  Clinical Goals: stable neuro status, therapies  Patient Goals: rest  Family Goals: olinda    Progress made toward(s) clinical / shift goals:  understands poc.    Patient is not progressing towards the following goals:

## 2023-03-15 NOTE — THERAPY
"Occupational Therapy   Initial Evaluation     Patient Name: James Samaniego  Age:  62 y.o., Sex:  male  Medical Record #: [id number]  Today's Date: 3/15/2023     Precautions  Precautions: Fall Risk, Swallow Precautions    Assessment  Patient is 62 y.o. male who presents to acute w/ traumatic ICH after GLF and seizures. PMH includes alcohol abuse. Pt Today pt required min A for toilet txf and standing grooming, mod A  LB dressing. Pt demo'd impaired balance, functional mobility, and activity tolerance impacting functional independence. Will continue to follow.     Plan    Occupational Therapy Initial Treatment Plan   Treatment Interventions: Self Care / Activities of Daily Living, Neuro Re-Education / Balance, Therapeutic Activity, Therapeutic Exercises, Adaptive Equipment  Treatment Frequency: 3 Times per Week  Duration: Until Therapy Goals Met    DC Equipment Recommendations: Unable to determine at this time  Discharge Recommendations: Recommend post-acute placement for additional occupational therapy services prior to discharge home     Subjective    \"I need to find my friend\"     Objective       03/15/23 0924   Charge Group   OT Evaluation OT Evaluation Mod   Total Time Spent   OT Evaluation (Minutes) 30   OT Total Time Spent (Calculated) 30   Initial Contact Note    Initial Contact Note Order Received and Verified, Occupational Therapy Evaluation in Progress with Full Report to Follow.   Prior Living Situation   Prior Services None   Housing / Facility Homeless   Lives with - Patient's Self Care Capacity Alone and Able to Care For Self   Comments Pt lives at [facility]   Prior Level of ADL Function   Self Feeding Independent   Grooming / Hygiene Independent   Bathing Independent   Dressing Independent   Toileting Independent   Prior Level of IADL Function   Shopping Independent   Prior Level Of Mobility Supervision With Device in Community;Independent With Device in Home   Driving / Transportation Walks;Utilizes " Public Transportation   Precautions   Precautions Fall Risk;Swallow Precautions   Vitals   O2 (LPM) 0   O2 Delivery Device None - Room Air   Pain 0 - 10 Group   Therapist Pain Assessment Nurse Notified;Post Activity Pain Same as Prior to Activity  (no c/o pain during session)   Cognition    Cognition / Consciousness X   Speech/ Communication Delayed Responses   Level of Consciousness Alert   Ability To Follow Commands 1 Step   Safety Awareness Impaired;Impulsive   Attention Impaired   Comments pleasent and cooperative, poor insight into deficits   Active ROM Upper Body   Active ROM Upper Body  WDL   Strength Upper Body   Comments WFL   Coordination Upper Body   Coordination WDL   Balance Assessment   Sitting Balance (Static) Fair   Sitting Balance (Dynamic) Fair -   Standing Balance (Static) Poor   Standing Balance (Dynamic) Poor -   Weight Shift Sitting Fair   Weight Shift Standing Poor   Comments w/ 4WW   Bed Mobility    Supine to Sit Contact Guard Assist   Sit to Supine   (NT in chair post)   Scooting Supervised   ADL Assessment   Grooming Minimal Assist;Standing  (washed hands and face)   Upper Body Dressing Minimal Assist   Lower Body Dressing Moderate Assist   Toileting Minimal Assist  (sat on toilet to void)   How much help from another person does the patient currently need...   Putting on and taking off regular lower body clothing? 2   Bathing (including washing, rinsing, and drying)? 2   Toileting, which includes using a toilet, bedpan, or urinal? 3   Putting on and taking off regular upper body clothing? 3   Taking care of personal grooming such as brushing teeth? 3   Eating meals? 4   6 Clicks Daily Activity Score 17   Functional Mobility   Sit to Stand Contact Guard Assist   Bed, Chair, Wheelchair Transfer Minimal Assist   Toilet Transfers Minimal Assist   Mobility within room and bathroom w/ 4WW   Activity Tolerance   Sitting in Chair 10+ min (up post)   Sitting Edge of Bed 5 min   Standing 6 min    Patient / Family Goals   Patient / Family Goal #1 To find his friend   Short Term Goals   Short Term Goal # 1 Pt will demo LB dressing w/ SPV   Short Term Goal # 2 Pt will demo standing grooming w/ SPV   Short Term Goal # 3 Pt will demo toilet txf w/ SPV   Short Term Goal # 4 Pt will demo toilet hygiene w/ SPV   Education Group   Role of Occupational Therapist Patient Response Patient;Acceptance;Explanation;Demonstration;Verbal Demonstration;Action Demonstration   Occupational Therapy Initial Treatment Plan    Treatment Interventions Self Care / Activities of Daily Living;Neuro Re-Education / Balance;Therapeutic Activity;Therapeutic Exercises;Adaptive Equipment   Treatment Frequency 3 Times per Week   Duration Until Therapy Goals Met   Problem List   Problem List Decreased Active Daily Living Skills;Decreased Homemaking Skills;Decreased Functional Mobility;Decreased Activity Tolerance;Impaired Postural Control / Balance   Anticipated Discharge Equipment and Recommendations   DC Equipment Recommendations Unable to determine at this time   Discharge Recommendations Recommend post-acute placement for additional occupational therapy services prior to discharge home   Interdisciplinary Plan of Care Collaboration   IDT Collaboration with  Nursing;Physical Therapist   Patient Position at End of Therapy Seated;Chair Alarm On;Call Light within Reach;Tray Table within Reach;Phone within Reach;Self Releasing Lap Belt Applied  (pt demo'd ability to don/doff lap belt)   Collaboration Comments report given   Session Information   Date / Session Number  3/15, 1 (1/3, 3/21)

## 2023-03-16 ENCOUNTER — PATIENT OUTREACH (OUTPATIENT)
Dept: SCHEDULING | Facility: IMAGING CENTER | Age: 63
End: 2023-03-16
Payer: MEDICAID

## 2023-03-16 VITALS
RESPIRATION RATE: 18 BRPM | BODY MASS INDEX: 18.52 KG/M2 | DIASTOLIC BLOOD PRESSURE: 67 MMHG | HEART RATE: 81 BPM | WEIGHT: 132.28 LBS | TEMPERATURE: 98.1 F | OXYGEN SATURATION: 90 % | SYSTOLIC BLOOD PRESSURE: 88 MMHG | HEIGHT: 71 IN

## 2023-03-16 PROBLEM — R13.10 DYSPHAGIA: Status: RESOLVED | Noted: 2023-03-12 | Resolved: 2023-03-16

## 2023-03-16 PROBLEM — A41.9 SEPSIS (HCC): Status: RESOLVED | Noted: 2022-07-10 | Resolved: 2023-03-16

## 2023-03-16 PROBLEM — Z59.00 HOMELESSNESS: Status: ACTIVE | Noted: 2023-03-16

## 2023-03-16 PROBLEM — E87.1 HYPONATREMIA: Status: RESOLVED | Noted: 2023-03-12 | Resolved: 2023-03-16

## 2023-03-16 PROBLEM — R56.9 SEIZURE (HCC): Status: RESOLVED | Noted: 2023-03-11 | Resolved: 2023-03-16

## 2023-03-16 PROBLEM — G91.2 NPH (NORMAL PRESSURE HYDROCEPHALUS) (HCC): Status: ACTIVE | Noted: 2023-03-15

## 2023-03-16 LAB
ANION GAP SERPL CALC-SCNC: 11 MMOL/L (ref 7–16)
BACTERIA BLD CULT: NORMAL
BACTERIA BLD CULT: NORMAL
BUN SERPL-MCNC: 7 MG/DL (ref 8–22)
CALCIUM SERPL-MCNC: 9.1 MG/DL (ref 8.5–10.5)
CHLORIDE SERPL-SCNC: 106 MMOL/L (ref 96–112)
CO2 SERPL-SCNC: 22 MMOL/L (ref 20–33)
CREAT SERPL-MCNC: 0.55 MG/DL (ref 0.5–1.4)
GFR SERPLBLD CREATININE-BSD FMLA CKD-EPI: 113 ML/MIN/1.73 M 2
GLUCOSE SERPL-MCNC: 86 MG/DL (ref 65–99)
MAGNESIUM SERPL-MCNC: 1.9 MG/DL (ref 1.5–2.5)
POTASSIUM SERPL-SCNC: 4.2 MMOL/L (ref 3.6–5.5)
SARS-COV+SARS-COV-2 AG RESP QL IA.RAPID: NOTDETECTED
SIGNIFICANT IND 70042: NORMAL
SIGNIFICANT IND 70042: NORMAL
SITE SITE: NORMAL
SITE SITE: NORMAL
SODIUM SERPL-SCNC: 139 MMOL/L (ref 135–145)
SOURCE SOURCE: NORMAL
SOURCE SOURCE: NORMAL
SPECIMEN SOURCE: NORMAL

## 2023-03-16 PROCEDURE — 700102 HCHG RX REV CODE 250 W/ 637 OVERRIDE(OP): Performed by: STUDENT IN AN ORGANIZED HEALTH CARE EDUCATION/TRAINING PROGRAM

## 2023-03-16 PROCEDURE — A9270 NON-COVERED ITEM OR SERVICE: HCPCS | Performed by: STUDENT IN AN ORGANIZED HEALTH CARE EDUCATION/TRAINING PROGRAM

## 2023-03-16 PROCEDURE — 99239 HOSP IP/OBS DSCHRG MGMT >30: CPT | Performed by: STUDENT IN AN ORGANIZED HEALTH CARE EDUCATION/TRAINING PROGRAM

## 2023-03-16 PROCEDURE — 80048 BASIC METABOLIC PNL TOTAL CA: CPT

## 2023-03-16 PROCEDURE — A9270 NON-COVERED ITEM OR SERVICE: HCPCS | Performed by: INTERNAL MEDICINE

## 2023-03-16 PROCEDURE — 36415 COLL VENOUS BLD VENIPUNCTURE: CPT

## 2023-03-16 PROCEDURE — 83735 ASSAY OF MAGNESIUM: CPT

## 2023-03-16 PROCEDURE — 700111 HCHG RX REV CODE 636 W/ 250 OVERRIDE (IP): Performed by: STUDENT IN AN ORGANIZED HEALTH CARE EDUCATION/TRAINING PROGRAM

## 2023-03-16 PROCEDURE — 700102 HCHG RX REV CODE 250 W/ 637 OVERRIDE(OP): Performed by: INTERNAL MEDICINE

## 2023-03-16 PROCEDURE — 87426 SARSCOV CORONAVIRUS AG IA: CPT

## 2023-03-16 RX ORDER — FOLIC ACID 1 MG/1
1 TABLET ORAL DAILY
Qty: 30 TABLET | Status: SHIPPED
Start: 2023-03-16 | End: 2023-04-15

## 2023-03-16 RX ORDER — GABAPENTIN 100 MG/1
CAPSULE ORAL
Status: SHIPPED
Start: 2023-03-16 | End: 2023-03-24

## 2023-03-16 RX ORDER — LANOLIN ALCOHOL/MO/W.PET/CERES
100 CREAM (GRAM) TOPICAL DAILY
Qty: 30 TABLET | Status: SHIPPED
Start: 2023-03-17

## 2023-03-16 RX ORDER — LOSARTAN POTASSIUM 50 MG/1
50 TABLET ORAL DAILY
Qty: 30 TABLET | Status: SHIPPED
Start: 2023-03-17

## 2023-03-16 RX ADMIN — HYDRALAZINE HYDROCHLORIDE 20 MG: 20 INJECTION INTRAMUSCULAR; INTRAVENOUS at 12:42

## 2023-03-16 RX ADMIN — LOSARTAN POTASSIUM 50 MG: 50 TABLET, FILM COATED ORAL at 04:26

## 2023-03-16 RX ADMIN — Medication 100 MG: at 04:27

## 2023-03-16 RX ADMIN — GABAPENTIN 200 MG: 100 CAPSULE ORAL at 04:26

## 2023-03-16 RX ADMIN — ACETAMINOPHEN 650 MG: 325 TABLET, FILM COATED ORAL at 04:27

## 2023-03-16 RX ADMIN — GABAPENTIN 200 MG: 100 CAPSULE ORAL at 12:12

## 2023-03-16 RX ADMIN — ACETAMINOPHEN 650 MG: 325 TABLET, FILM COATED ORAL at 00:48

## 2023-03-16 ASSESSMENT — PAIN DESCRIPTION - PAIN TYPE
TYPE: ACUTE PAIN

## 2023-03-16 NOTE — DISCHARGE PLANNING
Agency/Facility Name: [facility]  Spoke To: Maricruz  Outcome: DPA received call from facility. Per Maricruz, pt has a bed there this morning. DPA to call back after finding out if pt is medically clear to go    0917    Agency/Facility Name: [facility]  Outcome: DPA left vmail letting Maric[facility] know that pt does want to go there     LSW notified     0920    [facility] Name: [facility]  Spoke To: Maricruz  Outcome: DPA received call back from facility. Per Maricruz, she will set up transport and call DPA back with confirmed transport time.    LSW notified     1146    Agency/Facility Name: [facility]  Spoke To: Maricruz  Outcome: DPA received call back from facility. Per Maricruz, pts transport is set up with WMT between [phone]    LSW notified

## 2023-03-16 NOTE — CARE PLAN
The patient is Watcher - Medium risk of patient condition declining or worsening    Shift Goals  Clinical Goals: Stable Neuro; Safety; MRI  Patient Goals: Rest and Eat  Family Goals: LIN    Progress made toward(s) clinical / shift goals:  Assumed care of pt at 1845. POC discussed with pt. Pt A/Ox 4 and verbalized understanding. Pt very impulsive and confused in conversation. Pt agitated throughout shift but is able to reorient. Pt on fall/seizure/aspiration precautions. Pt on Q4hr neuro checks and Q2hr turns. Pt educated to use the call light for assistance. Call light within reach. Pt rounded on frequently throughout the shift.      Problem: Fall Risk  Goal: Patient will remain free from falls  Outcome: Progressing  Note: Fall precautions in place. Bed locked and in lowest position, bed alarm in place, treaded socks used when ambulated, call light within reach. Pt educated to call for assistance. Pt rounded on frequently throughout shift.       Problem: Pain - Standard  Goal: Alleviation of pain or a reduction in pain to the patient’s comfort goal  Outcome: Progressing  Note: Pt's pain assessed throughout shift. Patient offered pharmacological and non-pharmacological interventions.        Patient is not progressing towards the following goals:

## 2023-03-16 NOTE — CARE PLAN
Problem: Knowledge Deficit - Standard  Goal: Patient and family/care givers will demonstrate understanding of plan of care, disease process/condition, diagnostic tests and medications  Outcome: Progressing     Problem: Optimal Care for Alcohol Withdrawal  Goal: Optimal Care for the alcohol withdrawal patient  Outcome: Progressing     Problem: Seizure Precautions  Goal: Implementation of seizure precautions  Outcome: Progressing     Problem: Lifestyle Changes  Goal: Patient's ability to identify lifestyle changes and available resources to help reduce recurrence of condition will improve  Outcome: Progressing     Problem: Psychosocial  Goal: Patient's level of anxiety will decrease  Outcome: Progressing  Goal: Spiritual and cultural needs incorporated into hospitalization  Outcome: Progressing     Problem: Risk for Aspiration  Goal: Patient's risk for aspiration will be absent or decrease  Outcome: Progressing     Problem: Hemodynamics  Goal: Patient's hemodynamics, fluid balance and neurologic status will be stable or improve  Outcome: Progressing     Problem: Fluid Volume  Goal: Fluid volume balance will be maintained  Outcome: Progressing     Problem: Urinary - Renal Perfusion  Goal: Ability to achieve and maintain adequate renal perfusion and functioning will improve  Outcome: Progressing     Problem: Respiratory  Goal: Patient will achieve/maintain optimum respiratory ventilation and gas exchange  Outcome: Progressing     Problem: Physical Regulation  Goal: Diagnostic test results will improve  Outcome: Progressing  Goal: Signs and symptoms of infection will decrease  Outcome: Progressing     Problem: Skin Integrity  Goal: Skin integrity is maintained or improved  Outcome: Progressing     Problem: Fall Risk  Goal: Patient will remain free from falls  Outcome: Progressing     Problem: Pain - Standard  Goal: Alleviation of pain or a reduction in pain to the patient’s comfort goal  Outcome: Progressing      Problem: Lumbar/Thoracic Spine Surgery  Goal: Post-Operative Lumbar/Thoracic Spine Surgery: Patient will achieve optimal post-surgical outcomes  Outcome: Progressing     Problem: Cervical Spine Surgery  Goal: Post-Operative Cervical Spine Surgery: Patient will achieve optimal post-surgical outcomes  Outcome: Progressing     Problem: Neuro Status  Goal: Neuro status will remain stable or improve  Outcome: Progressing     Problem: Knowledge Deficit - Neuro Surgical  Goal: Patient's understanding of spinal precautions, appropriate body mechanics and incision care will improve  Outcome: Progressing     Problem: Respiratory/Oxygenation Function Post-Surgical  Goal: Patient will achieve/maintain normal respiratory rate/effort  Outcome: Progressing     Problem: Early Mobilization - Post Surgery  Goal: Early mobilization post surgery  Outcome: Progressing     Problem: Neurovascular Monitoring  Goal: Patient's neurovascular status will be maintained or improve  Outcome: Progressing     Problem: Bowel Elimination - Post Surgical  Goal: Patient will resume regular bowel sounds and function with no discomfort or distention  Outcome: Progressing     Problem: Pain - Post Surgery  Goal: Alleviation or reduction of pain post surgery  Outcome: Progressing  Goal: Proper management of On-Q Pump  Outcome: Progressing     Problem: Incision Care  Goal: Optimal post surgical incision care  Outcome: Progressing     Problem: Risk for Post Op Fluid Imbalance  Goal: Promotion of fluid balance  Outcome: Progressing     Problem: Surgical Drain Management  Goal: Proper management/care of surgical drains will be maintained  Outcome: Progressing     Problem: Lumbar Drain Management  Goal: Proper management/care of lumbar drain will be maintained  Outcome: Progressing     Problem: Psychosocial  Goal: Patient's level of anxiety will decrease  Outcome: Progressing  Goal: Spiritual and cultural needs incorporated into hospitalization  Outcome:  Progressing  Goal: Patient's ability to verbalize feelings about condition will improve  Outcome: Progressing  Goal: Patient's ability to re-evaluate and adapt role responsibilities will improve  Outcome: Progressing  Goal: Patient and family will demonstrate ability to cope with life altering diagnosis and/or procedure  Outcome: Progressing     Problem: Venous Thromboembolism (VTE) Prevention  Goal: The patient will remain free from venous thromboembolism (VTE)  Outcome: Progressing     Problem: Nutrition  Goal: Patient's nutritional and fluid intake will be adequate or improve  Outcome: Progressing  Goal: Enteral nutrition will be maintained or improve  Outcome: Progressing  Goal: Enteral nutrition will be maintained or improve  Outcome: Progressing   The patient is Stable - Low risk of patient condition declining or worsening    Shift Goals  Clinical Goals: stable neuro, MRI  Patient Goals: rest, see friend  Family Goals: olinda    Progress made toward(s) clinical / shift goals:  understands poc. Stable neuro status    Patient is not progressing towards the following goals:

## 2023-03-16 NOTE — PROGRESS NOTES
Monitor Room Called 2037 for 4 beats of Vtach. Pt asymptomatic. On-Call Hospitalist, Rolo Allen notified. Labs ordered.

## 2023-03-16 NOTE — DISCHARGE PLANNING
Case Management Discharge Planning    Admission Date: 3/11/2023  GMLOS: 5  ALOS: 5    6-Clicks ADL Score: 17  6-Clicks Mobility Score: 16  PT and/or OT Eval ordered: Yes  Post-acute Referrals Ordered: Yes  Post-acute Choice Obtained: Yes  Has referral(s) been sent to post-acute provider:  Yes    Anticipated Discharge Dispo: Discharge Disposition: D/T to SNF with medicare cert w/planned hosp IP readmit (83)    DME Needed: No    Action(s) Taken: Updated Provider/Nurse on Discharge Plan, Acceptance Received, and Transport Arranged     Escalations Completed: None    Medically Clear: Yes    Next Steps: Patient discussed during morning IDT rounds with team. Patient is medically cleared for discharge at this time. Patient accepted to [facility]. Facility arranged transportation, WMT  [phone]. SW spoke to patient to inform of acceptance, patient agreeable to transfer. No needs at this time.     Barriers to Discharge: None

## 2023-03-16 NOTE — PROGRESS NOTES
Monitor summary: SR, HR 62-83, CT 0.15, QRS 0.05, QT 0.39 with (R)PVCs, (R)PACs, (R)Couplets and 4 beats of VTACH per strip from monitor room

## 2023-03-16 NOTE — DISCHARGE SUMMARY
Discharge Summary    CHIEF COMPLAINT ON ADMISSION  Chief Complaint   Patient presents with    Seizure     Pt had about 1 minute witnessed seizure. Pt did hit L front of head.        Reason for Admission  Traumatic intracerebral hemorrhage and seizure    Admission Date  3/11/2023     CODE STATUS  Full Code    HPI & HOSPITAL COURSE  James Samaniego is a homeless 62 y.o. male with tobacco use, hyperthyroid, normal pressure hydrocephalus, and EtOH use who presented 3/11/2023 with a witnessed seizure x 1 minute.  He hit the left front of his head.  A CT head showed multiple small left frontal parenchymal hemorrhages. A CT cervical spine was negative for acute finding. Neurosurgery consulted and stated he has longstanding normal pressure hydrocephalus and was suppose to see neurosurgery last year but did not follow up. Neurosurgery did not feel any surgical intervention needed for the small intraparenchymal hemorrhages were needed. The patient is having some swallowing difficulty and failed a regular diet on swallow evaluation by Speech therapist but is on adjusted diet per speech. 3/13 failed swallow and concern of worsening agitation with possible alcohol withdrawal.  He failed swallow evaluation and enteral feeding started on March 13, 2023.  However, he subsequently pulled out his enteral feeding tube.  His diet was advanced, which he tolerated well.  He was started on low-dose gabapentin for his alcohol withdrawal.    Patient was evaluated by occupational and physical therapy, who recommended postacute placement.  Patient was advised to follow-up with neurosurgery outpatient clinic, Dr. Erwin Swain of [facility] for further evaluation and consideration of a  shunt for his normal pressure hydrocephalus.  Patient stated that he had some gait difficulties but did not have any urinary incontinence.  He was alert and orient x3 on the day of discharge.  Patient agreed to follow-up with neurosurgery following  discharge from the skilled nursing facility.    Therefore, he is discharged in good and stable condition to skilled nursing facility ([facility])    The patient met 2-midnight criteria for an inpatient stay at the time of discharge.    Discharge Date  3/16/2023     FOLLOW UP ITEMS POST DISCHARGE  -Follow-up with neurosurgery outpatient clinic, Dr. Erwin Swain of [facility] after being discharged from skilled nursing facility.  -Establish care and follow-up with PCP following discharge from skilled nursing facility.    DISCHARGE DIAGNOSES  Principal Problem:    Traumatic intracerebral hemorrhage, with unknown loss of consciousness status, initial encounter POA: Yes  Active Problems:    Normal pressure hydrocephalus (HCC) POA: Yes    Alcohol dependence (HCC) POA: Unknown    NPH (normal pressure hydrocephalus) (HCC) POA: Yes    History of hyperthyroidism POA: Yes    Marijuana use POA: Yes    Homelessness POA: Yes  Resolved Problems:    Sepsis (HCC) POA: Yes    Seizure (HCC) POA: Unknown    Hyponatremia POA: Unknown    Dysphagia POA: Unknown      FOLLOW UP  No future appointments.  [facility]  [id number]  [place] [address]  [phone]          Erwin Swain M.D.  [address]  [place] [address]  [phone]    Follow up in 2 week(s)      [facility]  [address] [facility] [place] [id number] [phone]        primary care    Follow up  Please call your primary care provider to schedule a hospital follow up. Thank you.      MEDICATIONS ON DISCHARGE     Medication List        Start taking these medications        Instructions   folic acid 1 MG Tabs  Commonly known as: FOLVITE   Take 1 Tablet by mouth every day for 30 days.  Dose: 1 mg     gabapentin 100 MG Caps  Start taking on: March 16, 2023  Commonly known as: NEURONTIN   Take 2 Capsules by mouth 3 times a day for 2 days, THEN 1 Capsule 3 times a day for 3 days, THEN 1 Capsule 2 times a day for 3 days.     losartan  50 MG Tabs  Start taking on: March 17, 2023  Commonly known as: COZAAR   Take 1 Tablet by mouth every day.  Dose: 50 mg     multivitamin Tabs   Take 1 Tablet by mouth every day.  Dose: 1 Tablet     thiamine 100 MG tablet  Start taking on: March 17, 2023  Commonly known as: THIAMINE   Take 1 Tablet by mouth every day.  Dose: 100 mg              Allergies  No Known Allergies    DIET  Orders Placed This Encounter   Procedures    Diet Order Diet: Level 5 - Minced and Moist (DESPITE RISK. oral care before and after meals. Meds as tolerated.); Liquid level: Level 0 - Thin; Tray Modifications (optional): SLP - Deliver to Nursing Station, SLP - 1:1 Supervision by Nursing, No S...     Standing Status:   Standing     Number of Occurrences:   1     Order Specific Question:   Diet:     Answer:   Level 5 - Minced and Moist [24]     Comments:   DESPITE RISK. oral care before and after meals. Meds as tolerated.     Order Specific Question:   Liquid level     Answer:   Level 0 - Thin     Order Specific Question:   Tray Modifications (optional)     Answer:   SLP - Deliver to Nursing Station     Order Specific Question:   Tray Modifications (optional)     Answer:   SLP - 1:1 Supervision by Nursing     Order Specific Question:   Tray Modifications (optional)     Answer:   No Straws       ACTIVITY  As tolerated and directed by skilled nursing.  Weight bearing as tolerated    LINES, DRAINS, AND WOUNDS  This is an automated list. Peripheral IVs will be removed prior to discharge.  Peripheral IV 03/16/23 Anterior;Left Forearm (Active)   Site Assessment Clean;Dry;Intact 03/16/23 0745   Dressing Type Transparent Film;Tape 03/16/23 0745   Line Status Scrubbed the hub prior to access;Flushed;Blood return noted;Infusing 03/16/23 0745   Dressing Status Clean;Dry;Intact 03/16/23 0745   Dressing Intervention Initial dressing 03/16/23 0600   Dressing Change Due 03/18/23 03/16/23 0745   Infiltration Grading [place] [facility] 0 03/16/23 0745    Phlebitis Scale ([facility] Only) 0 03/16/23 0745       Wound 03/14/23 Abrasion Face Upper Left Healing Scab (Active)   Wound Image   03/14/23 2335   Site Assessment Black;Dry;Clean;Red 03/16/23 0740   Periwound Assessment Clean;Dry;Intact 03/16/23 0740   Margins Defined edges 03/16/23 0740   Closure Open to air 03/15/23 2026   Dressing Changed Observed 03/15/23 2026   Dressing Status Open to Air 03/16/23 0740       Wound 03/14/23 Partial Thickness Wound Elbow Left (Active)   Wound Image   03/15/23 1200   Site Assessment Yellow 03/16/23 0740   Periwound Assessment Pink 03/16/23 0740   Margins Defined edges;Unattached edges 03/16/23 0740   Closure Adhesive bandage 03/15/23 2026   Drainage Amount Scant 03/15/23 1200   Drainage Description Serous 03/15/23 1200   Treatments Cleansed;Site care 03/15/23 1200   Wound Cleansing Approved Wound Cleanser 03/15/23 1200   Periwound Protectant Skin Protectant Wipes to Periwound 03/15/23 1200   Dressing Cleansing/Solutions Not Applicable 03/15/23 1200   Dressing Options Hydrofiber Silver;Silicone Adhesive Foam 03/15/23 1200   Dressing Changed Observed 03/15/23 2026   Dressing Status Clean;Dry;Intact 03/16/23 0740   Dressing Change/Treatment Frequency Every 72 hrs, and As Needed 03/15/23 1200   NEXT Dressing Change/Treatment Date 03/18/23 03/15/23 1200   NEXT Weekly Photo (Inpatient Only) 03/22/23 03/15/23 1200   Non-staged Wound Description Partial thickness 03/15/23 1200   Shape [place] 03/15/23 1200   Wound Odor None 03/15/23 1200   Pulses N/A 03/15/23 1200   Exposed Structures LIN 03/15/23 1200   WOUND NURSE ONLY - Time Spent with Patient (mins) 60 03/15/23 1200       Peripheral IV 03/16/23 Anterior;Left Forearm (Active)   Site Assessment Clean;Dry;Intact 03/16/23 0745   Dressing Type Transparent Film;Tape 03/16/23 0745   Line Status Scrubbed the hub prior to access;Flushed;Blood return noted;Infusing 03/16/23 0745   Dressing Status Clean;Dry;Intact 03/16/23 0745   Dressing  Intervention Initial dressing 03/16/23 0600   Dressing Change Due 03/18/23 03/16/23 0745   Infiltration Grading ([facility]) 0 03/16/23 0745   Phlebitis Scale ([facility] Only) 0 03/16/23 0745               MENTAL STATUS ON TRANSFER  Alert and oriented x3.  Appropriately conversational.        CONSULTATIONS   Neurosurgery    PROCEDURES   None    LABORATORY  Lab Results   Component Value Date    SODIUM 139 03/16/2023    POTASSIUM 4.2 03/16/2023    CHLORIDE 106 03/16/2023    CO2 22 03/16/2023    GLUCOSE 86 03/16/2023    BUN 7 (L) 03/16/2023    CREATININE 0.55 03/16/2023        Lab Results   Component Value Date    WBC 8.0 03/15/2023    HEMOGLOBIN 15.2 03/15/2023    HEMATOCRIT 44.4 03/15/2023    PLATELETCT 261 03/15/2023        Total time of the discharge process exceeds 33 minutes.

## 2023-03-16 NOTE — PROGRESS NOTES
Monitor summary: SR/ST , TN 0.15, QRS 0.05, QT 0.34, with rare-occasional PVCs, couplets and trigem per strip from monitor room.